# Patient Record
Sex: MALE | Race: WHITE | NOT HISPANIC OR LATINO | Employment: OTHER | URBAN - METROPOLITAN AREA
[De-identification: names, ages, dates, MRNs, and addresses within clinical notes are randomized per-mention and may not be internally consistent; named-entity substitution may affect disease eponyms.]

---

## 2022-07-18 SDOH — ECONOMIC STABILITY: FOOD INSECURITY: WITHIN THE PAST 12 MONTHS, THE FOOD YOU BOUGHT JUST DIDN'T LAST AND YOU DIDN'T HAVE MONEY TO GET MORE.: NEVER TRUE

## 2022-07-18 SDOH — ECONOMIC STABILITY: TRANSPORTATION INSECURITY
IN THE PAST 12 MONTHS, HAS THE LACK OF TRANSPORTATION KEPT YOU FROM MEDICAL APPOINTMENTS OR FROM GETTING MEDICATIONS?: NO

## 2022-07-18 SDOH — ECONOMIC STABILITY: FOOD INSECURITY: WITHIN THE PAST 12 MONTHS, YOU WORRIED THAT YOUR FOOD WOULD RUN OUT BEFORE YOU GOT MONEY TO BUY MORE.: NEVER TRUE

## 2022-07-18 SDOH — ECONOMIC STABILITY: HOUSING INSECURITY: IN THE LAST 12 MONTHS, HOW MANY PLACES HAVE YOU LIVED?: 1

## 2022-07-18 SDOH — HEALTH STABILITY: PHYSICAL HEALTH: ON AVERAGE, HOW MANY DAYS PER WEEK DO YOU ENGAGE IN MODERATE TO STRENUOUS EXERCISE (LIKE A BRISK WALK)?: 5 DAYS

## 2022-07-18 SDOH — ECONOMIC STABILITY: TRANSPORTATION INSECURITY
IN THE PAST 12 MONTHS, HAS LACK OF TRANSPORTATION KEPT YOU FROM MEETINGS, WORK, OR FROM GETTING THINGS NEEDED FOR DAILY LIVING?: NO

## 2022-07-18 SDOH — ECONOMIC STABILITY: HOUSING INSECURITY
IN THE LAST 12 MONTHS, WAS THERE A TIME WHEN YOU DID NOT HAVE A STEADY PLACE TO SLEEP OR SLEPT IN A SHELTER (INCLUDING NOW)?: NO

## 2022-07-18 SDOH — ECONOMIC STABILITY: INCOME INSECURITY: HOW HARD IS IT FOR YOU TO PAY FOR THE VERY BASICS LIKE FOOD, HOUSING, MEDICAL CARE, AND HEATING?: NOT HARD AT ALL

## 2022-07-18 SDOH — ECONOMIC STABILITY: INCOME INSECURITY: IN THE LAST 12 MONTHS, WAS THERE A TIME WHEN YOU WERE NOT ABLE TO PAY THE MORTGAGE OR RENT ON TIME?: NO

## 2022-07-18 SDOH — HEALTH STABILITY: PHYSICAL HEALTH: ON AVERAGE, HOW MANY MINUTES DO YOU ENGAGE IN EXERCISE AT THIS LEVEL?: 60 MIN

## 2022-07-18 ASSESSMENT — LIFESTYLE VARIABLES
AUDIT-C TOTAL SCORE: 4
SKIP TO QUESTIONS 9-10: 0
HOW OFTEN DO YOU HAVE A DRINK CONTAINING ALCOHOL: 2-3 TIMES A WEEK
HOW OFTEN DO YOU HAVE SIX OR MORE DRINKS ON ONE OCCASION: LESS THAN MONTHLY
HOW MANY STANDARD DRINKS CONTAINING ALCOHOL DO YOU HAVE ON A TYPICAL DAY: 1 OR 2

## 2022-07-18 ASSESSMENT — SOCIAL DETERMINANTS OF HEALTH (SDOH)
IN A TYPICAL WEEK, HOW MANY TIMES DO YOU TALK ON THE PHONE WITH FAMILY, FRIENDS, OR NEIGHBORS?: MORE THAN THREE TIMES A WEEK
HOW OFTEN DO YOU ATTEND CHURCH OR RELIGIOUS SERVICES?: MORE THAN 4 TIMES PER YEAR
ARE YOU MARRIED, WIDOWED, DIVORCED, SEPARATED, NEVER MARRIED, OR LIVING WITH A PARTNER?: LIVING WITH PARTNER
HOW OFTEN DO YOU ATTENT MEETINGS OF THE CLUB OR ORGANIZATION YOU BELONG TO?: NEVER
DO YOU BELONG TO ANY CLUBS OR ORGANIZATIONS SUCH AS CHURCH GROUPS UNIONS, FRATERNAL OR ATHLETIC GROUPS, OR SCHOOL GROUPS?: NO
HOW OFTEN DO YOU GET TOGETHER WITH FRIENDS OR RELATIVES?: ONCE A WEEK

## 2022-07-31 SDOH — ECONOMIC STABILITY: TRANSPORTATION INSECURITY
IN THE PAST 12 MONTHS, HAS LACK OF RELIABLE TRANSPORTATION KEPT YOU FROM MEDICAL APPOINTMENTS, MEETINGS, WORK OR FROM GETTING THINGS NEEDED FOR DAILY LIVING?: NO

## 2022-07-31 SDOH — HEALTH STABILITY: PHYSICAL HEALTH: ON AVERAGE, HOW MANY MINUTES DO YOU ENGAGE IN EXERCISE AT THIS LEVEL?: 60 MIN

## 2022-07-31 SDOH — ECONOMIC STABILITY: HOUSING INSECURITY: IN THE LAST 12 MONTHS, HOW MANY PLACES HAVE YOU LIVED?: 1

## 2022-07-31 SDOH — ECONOMIC STABILITY: FOOD INSECURITY: WITHIN THE PAST 12 MONTHS, YOU WORRIED THAT YOUR FOOD WOULD RUN OUT BEFORE YOU GOT MONEY TO BUY MORE.: NEVER TRUE

## 2022-07-31 SDOH — ECONOMIC STABILITY: INCOME INSECURITY: HOW HARD IS IT FOR YOU TO PAY FOR THE VERY BASICS LIKE FOOD, HOUSING, MEDICAL CARE, AND HEATING?: NOT HARD AT ALL

## 2022-07-31 SDOH — ECONOMIC STABILITY: INCOME INSECURITY: IN THE LAST 12 MONTHS, WAS THERE A TIME WHEN YOU WERE NOT ABLE TO PAY THE MORTGAGE OR RENT ON TIME?: NO

## 2022-07-31 SDOH — HEALTH STABILITY: MENTAL HEALTH
STRESS IS WHEN SOMEONE FEELS TENSE, NERVOUS, ANXIOUS, OR CAN'T SLEEP AT NIGHT BECAUSE THEIR MIND IS TROUBLED. HOW STRESSED ARE YOU?: NOT AT ALL

## 2022-07-31 SDOH — HEALTH STABILITY: PHYSICAL HEALTH: ON AVERAGE, HOW MANY DAYS PER WEEK DO YOU ENGAGE IN MODERATE TO STRENUOUS EXERCISE (LIKE A BRISK WALK)?: 5 DAYS

## 2022-07-31 SDOH — ECONOMIC STABILITY: FOOD INSECURITY: WITHIN THE PAST 12 MONTHS, THE FOOD YOU BOUGHT JUST DIDN'T LAST AND YOU DIDN'T HAVE MONEY TO GET MORE.: NEVER TRUE

## 2022-07-31 ASSESSMENT — SOCIAL DETERMINANTS OF HEALTH (SDOH)
HOW OFTEN DO YOU HAVE A DRINK CONTAINING ALCOHOL: 2-3 TIMES A WEEK
HOW OFTEN DO YOU ATTENT MEETINGS OF THE CLUB OR ORGANIZATION YOU BELONG TO?: NEVER
HOW OFTEN DO YOU HAVE SIX OR MORE DRINKS ON ONE OCCASION: LESS THAN MONTHLY
HOW HARD IS IT FOR YOU TO PAY FOR THE VERY BASICS LIKE FOOD, HOUSING, MEDICAL CARE, AND HEATING?: NOT HARD AT ALL
ARE YOU MARRIED, WIDOWED, DIVORCED, SEPARATED, NEVER MARRIED, OR LIVING WITH A PARTNER?: LIVING WITH PARTNER
DO YOU BELONG TO ANY CLUBS OR ORGANIZATIONS SUCH AS CHURCH GROUPS UNIONS, FRATERNAL OR ATHLETIC GROUPS, OR SCHOOL GROUPS?: NO
HOW OFTEN DO YOU GET TOGETHER WITH FRIENDS OR RELATIVES?: ONCE A WEEK
HOW OFTEN DO YOU ATTEND CHURCH OR RELIGIOUS SERVICES?: MORE THAN 4 TIMES PER YEAR
WITHIN THE PAST 12 MONTHS, YOU WORRIED THAT YOUR FOOD WOULD RUN OUT BEFORE YOU GOT THE MONEY TO BUY MORE: NEVER TRUE
DO YOU BELONG TO ANY CLUBS OR ORGANIZATIONS SUCH AS CHURCH GROUPS UNIONS, FRATERNAL OR ATHLETIC GROUPS, OR SCHOOL GROUPS?: NO
HOW MANY DRINKS CONTAINING ALCOHOL DO YOU HAVE ON A TYPICAL DAY WHEN YOU ARE DRINKING: 1 OR 2
IN A TYPICAL WEEK, HOW MANY TIMES DO YOU TALK ON THE PHONE WITH FAMILY, FRIENDS, OR NEIGHBORS?: MORE THAN THREE TIMES A WEEK
IN A TYPICAL WEEK, HOW MANY TIMES DO YOU TALK ON THE PHONE WITH FAMILY, FRIENDS, OR NEIGHBORS?: MORE THAN THREE TIMES A WEEK
HOW OFTEN DO YOU ATTENT MEETINGS OF THE CLUB OR ORGANIZATION YOU BELONG TO?: NEVER
HOW OFTEN DO YOU ATTEND CHURCH OR RELIGIOUS SERVICES?: MORE THAN 4 TIMES PER YEAR
ARE YOU MARRIED, WIDOWED, DIVORCED, SEPARATED, NEVER MARRIED, OR LIVING WITH A PARTNER?: LIVING WITH PARTNER
HOW OFTEN DO YOU GET TOGETHER WITH FRIENDS OR RELATIVES?: ONCE A WEEK

## 2022-07-31 ASSESSMENT — LIFESTYLE VARIABLES
HOW OFTEN DO YOU HAVE SIX OR MORE DRINKS ON ONE OCCASION: LESS THAN MONTHLY
AUDIT-C TOTAL SCORE: 4
SKIP TO QUESTIONS 9-10: 0
HOW MANY STANDARD DRINKS CONTAINING ALCOHOL DO YOU HAVE ON A TYPICAL DAY: 1 OR 2
HOW OFTEN DO YOU HAVE A DRINK CONTAINING ALCOHOL: 2-3 TIMES A WEEK

## 2022-08-01 ENCOUNTER — OFFICE VISIT (OUTPATIENT)
Dept: SPORTS MEDICINE | Facility: CLINIC | Age: 32
End: 2022-08-01
Payer: OTHER GOVERNMENT

## 2022-08-01 VITALS
SYSTOLIC BLOOD PRESSURE: 118 MMHG | HEIGHT: 67 IN | TEMPERATURE: 98.9 F | OXYGEN SATURATION: 98 % | WEIGHT: 175 LBS | HEART RATE: 80 BPM | RESPIRATION RATE: 16 BRPM | DIASTOLIC BLOOD PRESSURE: 80 MMHG | BODY MASS INDEX: 27.47 KG/M2

## 2022-08-01 DIAGNOSIS — S76.311S PARTIAL HAMSTRING TEAR, RIGHT, SEQUELA: ICD-10-CM

## 2022-08-01 PROCEDURE — 99203 OFFICE O/P NEW LOW 30 MIN: CPT | Performed by: FAMILY MEDICINE

## 2022-08-01 ASSESSMENT — ENCOUNTER SYMPTOMS
NAUSEA: 0
SHORTNESS OF BREATH: 0
FEVER: 0
DIZZINESS: 0
VOMITING: 0
CHILLS: 0

## 2022-08-01 NOTE — PROGRESS NOTES
"Chief Complaint   Patient presents with   • Leg Pain     Referral from PCP/ R hamstring pain        Subjective     Referred by [facility] Lisa  for evaluation of RIGHT hamstring weakness  Combat deployment  Approximately March 15, 2017  Approximately 3 days after his incident he did see one of the ship/crew physicians which provided him with some exercises and diagnosed him with a \"partial hamstring tear\"  Slipped on wet marble floor  Had mid posterior hamstring pain  Noticed bruising at the hamstring  For his annual fitness testing he elects to use the cycling test as opposed to running because running is challenging for him due to the hamstring injury  He still experiences pain with engaging the hamstring muscle aggressively  Any leg exercises involving hamstring engagement such as hamstring curl can be painful so he avoids those activities  He also gets intermittent cramping in the RIGHT lower extremity/hamstring region which occurs about once per week lasting a minute or so  Took 3-6 months to recover and about a year to recover fully to his current baseline  He still has a defect in the muscle with noticeable weakness with certain activities  Has challenges with splinting and running at fast speeds  Never had workup back then since he was in combat zone and busy  Notices weakness in the muscle  POSITIVE night symptoms intermittently, about once per week which is when he gets his \"charley horse\"/cramping episodes  Not taking medication for currently      Likes mountain biking and skiing    Review of Systems   Constitutional: Negative for chills and fever.   Respiratory: Negative for shortness of breath.    Cardiovascular: Negative for chest pain.   Gastrointestinal: Negative for nausea and vomiting.   Neurological: Negative for dizziness.     PMH:  has no past medical history on file.  MEDS: No current outpatient medications on file.  ALLERGIES: No Known Allergies  SURGHX: No past " "surgical history on file.  SOCHX:  reports that he has never smoked. He has never used smokeless tobacco.  FH: Family history was reviewed, no pertinent findings to report    Objective   /80 (BP Location: Left arm, Patient Position: Sitting, BP Cuff Size: Adult)   Pulse 80   Temp 37.2 °C (98.9 °F) (Temporal)   Resp 16   Ht 1.702 m (5' 7\")   Wt 79.4 kg (175 lb)   SpO2 98%   BMI 27.41 kg/m²     HIP EXAM:  NORMAL gait    Right hip: Range of motion is intact  NEGATIVE pain with internal rotation  juani's test is NEGATIVE  NO tenderness of the trochanteric bursa  NO tenderness of the gluteus medius  Calin's test is NEGATIVE  Visibly noticeable and palpable defect at the lateral proximal third of the hamstring  POSITIVE pain and significant weakness with resisted flexion of the RIGHT hamstring with the knee fully extended.  Mild to moderate weakness of the RIGHT hamstring with resisted flexion with the knee flexexed to 90 degrees     Left hip: Range of motion is intact  NEGATIVE pain with internal rotation  juani's test is NEGATIVE  NO tenderness of the trochanteric bursa  NO tenderness of the gluteus medius  Calin's test is NEGATIVE  NO notable weakness with resisted knee flexion with the knee fully extended or flexed at 90 degrees    1. Partial hamstring tear, right, sequela  Referral to Physical Therapy    MR-FEMUR-W/O RIGHT     Combat deployment  Approximately March 15, 2017  Approximately 3 days after his incident he did see one of the ship/crew physicians which provided him with some exercises and diagnosed him with a \"partial hamstring tear\"  Slipped on wet marble floor    Referral for physical [facility] ([facility])    Check MRI of the RIGHT thigh for assessment of the hamstring        8/1/22                        Days after injury back in 2017      No follow-ups on file.      Thank you [facility] for allowing me to participate in caring for your patient.      CC:  [facility] " [facility]   FAX  [phone]

## 2022-08-11 ENCOUNTER — APPOINTMENT (OUTPATIENT)
Dept: RADIOLOGY | Facility: MEDICAL CENTER | Age: 32
End: 2022-08-11
Attending: FAMILY MEDICINE
Payer: OTHER GOVERNMENT

## 2022-08-12 ENCOUNTER — APPOINTMENT (OUTPATIENT)
Dept: RADIOLOGY | Facility: MEDICAL CENTER | Age: 32
End: 2022-08-12
Attending: FAMILY MEDICINE
Payer: OTHER GOVERNMENT

## 2022-08-12 DIAGNOSIS — S76.311S PARTIAL HAMSTRING TEAR, RIGHT, SEQUELA: ICD-10-CM

## 2022-08-12 PROCEDURE — 73718 MRI LOWER EXTREMITY W/O DYE: CPT | Mod: RT

## 2022-08-15 ENCOUNTER — OFFICE VISIT (OUTPATIENT)
Dept: SPORTS MEDICINE | Facility: CLINIC | Age: 32
End: 2022-08-15
Payer: OTHER GOVERNMENT

## 2022-08-15 VITALS — BODY MASS INDEX: 27.47 KG/M2 | HEIGHT: 67 IN | WEIGHT: 175 LBS

## 2022-08-15 DIAGNOSIS — S76.311S PARTIAL HAMSTRING TEAR, RIGHT, SEQUELA: ICD-10-CM

## 2022-08-15 PROCEDURE — 99213 OFFICE O/P EST LOW 20 MIN: CPT | Performed by: FAMILY MEDICINE

## 2022-08-15 NOTE — PROGRESS NOTES
1. Partial hamstring tear, right, sequela          Patient is here to discuss MRI results  There is evidence of old hamstring injury at the insertion  Nearly 50% abnormality at the insertion of the hamstring tendon with defect in the muscle    We discussed management including formal physical therapy to try and salvage what is left over of the tendon and muscle function    He has already been referred for formal physical therapy ([facility])        8/12/2022 5:00 PM     HISTORY/REASON FOR EXAM:  Upper leg trauma, neurovasc/lig/tendon injury suspected; Please include proximal hamstring insertion  Right hamstring muscle tear     TECHNIQUE/EXAM DESCRIPTION:  MRI of the RIGHT femur without.     The study was performed on a Siemens Skyra 3.0 Blanca MRI scanner. T1 sagittal, T1 coronal, T1 axial, T2 fat-suppressed axial, T1 postcontrast fat-suppressed axial, and fast spin-echo inversion recovery sagittal images were obtained of the femur.     COMPARISON: None.     FINDINGS:     There is a small right knee joint effusion.        BONE AND MARROW: The bones and bone marrow are normal in signal intensity and morphology.     MUSCLES: There is atrophy of the hamstring muscles.     LIGAMENTS and TENDONS: The hamstring insertion on the ischio tuberosity is abnormal. The tendon is attenuated and some fibers attached to the tuberosity. Findings are consistent with prior tear with partial retraction. The tendon distal/inferior to the   insertion has areas of high an low T2 signal consistent with old fluid/hemorrhage.     MISCELLANEOUS: Visualized are tiny bilateral hydrocele.           IMPRESSION:     1.  Findings consistent with sequela of/old hamstring detachment from the ischial tuberosity with partial tear of the hamstring tendon but some intact fibers     2.  Just distal/inferior to the ischial tuberosity there is either scar tissue and some attached fibers and along the more distal course of the tendon there are  changes consistent with old hemorrhage     3.  The proximal hamstring muscles show mild-moderate atrophy     4.  Small knee joint effusion           Exam Ended: 08/12/22  5:50 PM Last Resulted: 08/13/22  2:57 PM           Interpreted in the office today with the patient    CC:  [facility]   FAX  [phone]

## 2022-08-27 ENCOUNTER — HOSPITAL ENCOUNTER (OUTPATIENT)
Facility: MEDICAL CENTER | Age: 32
End: 2022-08-27
Attending: HEALTH CARE PROVIDER
Payer: OTHER GOVERNMENT

## 2024-09-12 ENCOUNTER — APPOINTMENT (OUTPATIENT)
Dept: RADIOLOGY | Facility: MEDICAL CENTER | Age: 34
DRG: 871 | End: 2024-09-12
Attending: STUDENT IN AN ORGANIZED HEALTH CARE EDUCATION/TRAINING PROGRAM
Payer: OTHER GOVERNMENT

## 2024-09-12 ENCOUNTER — HOSPITAL ENCOUNTER (OUTPATIENT)
Dept: RADIOLOGY | Facility: MEDICAL CENTER | Age: 34
End: 2024-09-12

## 2024-09-12 ENCOUNTER — HOSPITAL ENCOUNTER (INPATIENT)
Facility: MEDICAL CENTER | Age: 34
LOS: 7 days | DRG: 871 | End: 2024-09-19
Attending: STUDENT IN AN ORGANIZED HEALTH CARE EDUCATION/TRAINING PROGRAM | Admitting: STUDENT IN AN ORGANIZED HEALTH CARE EDUCATION/TRAINING PROGRAM
Payer: OTHER GOVERNMENT

## 2024-09-12 DIAGNOSIS — G00.1 STREPTOCOCCUS PNEUMONIAE MENINGITIS: ICD-10-CM

## 2024-09-12 PROBLEM — R65.20: Status: ACTIVE | Noted: 2024-09-12

## 2024-09-12 PROBLEM — G93.41: Status: ACTIVE | Noted: 2024-09-12

## 2024-09-12 PROBLEM — A40.3: Status: ACTIVE | Noted: 2024-09-12

## 2024-09-12 PROBLEM — G04.90 ENCEPHALITIS: Status: ACTIVE | Noted: 2024-09-12

## 2024-09-12 PROBLEM — Z99.11 ON MECHANICALLY ASSISTED VENTILATION (HCC): Status: ACTIVE | Noted: 2024-09-12

## 2024-09-12 LAB
ALBUMIN SERPL BCP-MCNC: 4.2 G/DL (ref 3.2–4.9)
ALBUMIN/GLOB SERPL: 1.7 G/DL
ALP SERPL-CCNC: 39 U/L (ref 30–99)
ALT SERPL-CCNC: 16 U/L (ref 2–50)
AMPHET UR QL SCN: NEGATIVE
ANION GAP SERPL CALC-SCNC: 17 MMOL/L (ref 7–16)
APPEARANCE UR: CLEAR
AST SERPL-CCNC: 21 U/L (ref 12–45)
BACTERIA #/AREA URNS HPF: NEGATIVE /HPF
BARBITURATES UR QL SCN: NEGATIVE
BASE EXCESS BLDA CALC-SCNC: -7 MMOL/L (ref -4–3)
BASOPHILS # BLD AUTO: 0.2 % (ref 0–1.8)
BASOPHILS # BLD: 0.02 K/UL (ref 0–0.12)
BENZODIAZ UR QL SCN: NEGATIVE
BILIRUB SERPL-MCNC: 1.2 MG/DL (ref 0.1–1.5)
BILIRUB UR QL STRIP.AUTO: NEGATIVE
BODY TEMPERATURE: ABNORMAL DEGREES
BREATHS SETTING VENT: 20
BUN SERPL-MCNC: 12 MG/DL (ref 8–22)
BURR CELLS/RBC NFR CSF MANUAL: 0 %
BZE UR QL SCN: NEGATIVE
C GATTII+NEOFOR DNA CSF QL NAA+NON-PROBE: NOT DETECTED
CALCIUM ALBUM COR SERPL-MCNC: 8.5 MG/DL (ref 8.5–10.5)
CALCIUM SERPL-MCNC: 8.7 MG/DL (ref 8.5–10.5)
CANNABINOIDS UR QL SCN: NEGATIVE
CHLORIDE SERPL-SCNC: 101 MMOL/L (ref 96–112)
CLARITY CSF: ABNORMAL
CMV DNA CSF QL NAA+NON-PROBE: NOT DETECTED
CO2 BLDA-SCNC: 20 MMOL/L (ref 20–33)
CO2 SERPL-SCNC: 18 MMOL/L (ref 20–33)
COLOR CSF: ABNORMAL
COLOR SPUN CSF: ABNORMAL
COLOR UR: ABNORMAL
CREAT SERPL-MCNC: 0.92 MG/DL (ref 0.5–1.4)
DELSYS IDSYS: ABNORMAL
E COLI K1 DNA CSF QL NAA+NON-PROBE: NOT DETECTED
EKG IMPRESSION: NORMAL
EOSINOPHIL # BLD AUTO: 0 K/UL (ref 0–0.51)
EOSINOPHIL NFR BLD: 0 % (ref 0–6.9)
EPI CELLS #/AREA URNS HPF: NEGATIVE /HPF
ERYTHROCYTE [DISTWIDTH] IN BLOOD BY AUTOMATED COUNT: 39.7 FL (ref 35.9–50)
EV RNA CSF QL NAA+NON-PROBE: NOT DETECTED
FENTANYL UR QL: NEGATIVE
GFR SERPLBLD CREATININE-BSD FMLA CKD-EPI: 111 ML/MIN/1.73 M 2
GLOBULIN SER CALC-MCNC: 2.5 G/DL (ref 1.9–3.5)
GLUCOSE CSF-MCNC: 3 MG/DL (ref 40–80)
GLUCOSE SERPL-MCNC: 192 MG/DL (ref 65–99)
GLUCOSE UR STRIP.AUTO-MCNC: 500 MG/DL
GP B STREP DNA CSF QL NAA+NON-PROBE: NOT DETECTED
GRAM STN SPEC: ABNORMAL
HAEM INFLU DNA CSF QL NAA+NON-PROBE: NOT DETECTED
HCO3 BLDA-SCNC: 19.3 MMOL/L (ref 17–25)
HCT VFR BLD AUTO: 43.6 % (ref 42–52)
HGB BLD-MCNC: 14.6 G/DL (ref 14–18)
HHV6 DNA CSF QL NAA+NON-PROBE: NOT DETECTED
HOROWITZ INDEX BLDA+IHG-RTO: 297 MM[HG]
HSV1 DNA CSF QL NAA+NON-PROBE: NOT DETECTED
HSV2 DNA CSF QL NAA+NON-PROBE: NOT DETECTED
HYALINE CASTS #/AREA URNS LPF: ABNORMAL /LPF
IMM GRANULOCYTES # BLD AUTO: 0.03 K/UL (ref 0–0.11)
IMM GRANULOCYTES NFR BLD AUTO: 0.3 % (ref 0–0.9)
INR PPP: 1 (ref 0.87–1.13)
KETONES UR STRIP.AUTO-MCNC: 80 MG/DL
L MONOCYTOG DNA CSF QL NAA+NON-PROBE: NOT DETECTED
LACTATE BLD-SCNC: 1 MMOL/L (ref 0.5–2)
LEUKOCYTE ESTERASE UR QL STRIP.AUTO: NEGATIVE
LYMPHOCYTES # BLD AUTO: 0.27 K/UL (ref 1–4.8)
LYMPHOCYTES NFR BLD: 3.1 % (ref 22–41)
MCH RBC QN AUTO: 29.4 PG (ref 27–33)
MCHC RBC AUTO-ENTMCNC: 33.5 G/DL (ref 32.3–36.5)
MCV RBC AUTO: 87.7 FL (ref 81.4–97.8)
METHADONE UR QL SCN: NEGATIVE
MICRO URNS: ABNORMAL
MODE IMODE: ABNORMAL
MONOCYTES # BLD AUTO: 0.29 K/UL (ref 0–0.85)
MONOCYTES NFR BLD AUTO: 3.3 % (ref 0–13.4)
MONOS+MACROS NFR CSF MANUAL: 1 %
N MEN DNA CSF QL NAA+NON-PROBE: NOT DETECTED
NEUTROPHILS # BLD AUTO: 8.23 K/UL (ref 1.82–7.42)
NEUTROPHILS NFR BLD: 93.1 % (ref 44–72)
NEUTROPHILS NFR CSF: 99 %
NITRITE UR QL STRIP.AUTO: NEGATIVE
NRBC # BLD AUTO: 0 K/UL
NRBC BLD-RTO: 0 /100 WBC (ref 0–0.2)
NUC CELL # CSF: ABNORMAL CELLS/UL (ref 0–10)
O2/TOTAL GAS SETTING VFR VENT: 30 %
OPIATES UR QL SCN: POSITIVE
OXYCODONE UR QL SCN: NEGATIVE
PARECHOVIRUS A RNA CSF QL NAA+NON-PROBE: NOT DETECTED
PCO2 BLDA: 39.7 MMHG (ref 26–37)
PCO2 TEMP ADJ BLDA: 40.6 MMHG (ref 26–37)
PCP UR QL SCN: NEGATIVE
PEEP END EXPIRATORY PRESSURE IPEEP: 8 CMH20
PH BLDA: 7.29 [PH] (ref 7.4–7.5)
PH TEMP ADJ BLDA: 7.29 [PH] (ref 7.4–7.5)
PH UR STRIP.AUTO: 5 [PH] (ref 5–8)
PLATELET # BLD AUTO: 178 K/UL (ref 164–446)
PMV BLD AUTO: 9 FL (ref 9–12.9)
PO2 BLDA: 89 MMHG (ref 64–87)
PO2 TEMP ADJ BLDA: 92 MMHG (ref 64–87)
POTASSIUM SERPL-SCNC: 3.8 MMOL/L (ref 3.6–5.5)
PROPOXYPH UR QL SCN: NEGATIVE
PROT CSF-MCNC: >600 MG/DL (ref 15–45)
PROT SERPL-MCNC: 6.7 G/DL (ref 6–8.2)
PROT UR QL STRIP: NEGATIVE MG/DL
PROTHROMBIN TIME: 13.3 SEC (ref 12–14.6)
RBC # BLD AUTO: 4.97 M/UL (ref 4.7–6.1)
RBC # CSF: 1000 CELLS/UL
RBC # URNS HPF: ABNORMAL /HPF
RBC UR QL AUTO: ABNORMAL
S PNEUM DNA CSF QL NAA+NON-PROBE: DETECTED
SAO2 % BLDA: 96 % (ref 93–99)
SIGNIFICANT IND 70042: ABNORMAL
SITE SITE: ABNORMAL
SODIUM SERPL-SCNC: 136 MMOL/L (ref 135–145)
SOURCE SOURCE: ABNORMAL
SP GR UR STRIP.AUTO: >=1.045
SPECIMEN DRAWN FROM PATIENT: ABNORMAL
SPECIMEN VOL CSF: 6.3 ML
TIDAL VOLUME IVT: 380 ML
TRIGL SERPL-MCNC: 47 MG/DL (ref 0–149)
TROPONIN T SERPL-MCNC: <6 NG/L (ref 6–19)
TUBE # CSF: 4
TUBE # CSF: ABNORMAL
UROBILINOGEN UR STRIP.AUTO-MCNC: 0.2 MG/DL
VZV DNA CSF QL NAA+NON-PROBE: NOT DETECTED
WBC # BLD AUTO: 8.8 K/UL (ref 4.8–10.8)
WBC #/AREA URNS HPF: ABNORMAL /HPF

## 2024-09-12 PROCEDURE — 5A1945Z RESPIRATORY VENTILATION, 24-96 CONSECUTIVE HOURS: ICD-10-PCS | Performed by: STUDENT IN AN ORGANIZED HEALTH CARE EDUCATION/TRAINING PROGRAM

## 2024-09-12 PROCEDURE — 36415 COLL VENOUS BLD VENIPUNCTURE: CPT

## 2024-09-12 PROCEDURE — 87205 SMEAR GRAM STAIN: CPT

## 2024-09-12 PROCEDURE — 96365 THER/PROPH/DIAG IV INF INIT: CPT | Mod: XU

## 2024-09-12 PROCEDURE — 83605 ASSAY OF LACTIC ACID: CPT

## 2024-09-12 PROCEDURE — 96367 TX/PROPH/DG ADDL SEQ IV INF: CPT | Mod: XU

## 2024-09-12 PROCEDURE — 84157 ASSAY OF PROTEIN OTHER: CPT

## 2024-09-12 PROCEDURE — 96375 TX/PRO/DX INJ NEW DRUG ADDON: CPT | Mod: XU

## 2024-09-12 PROCEDURE — 87641 MR-STAPH DNA AMP PROBE: CPT

## 2024-09-12 PROCEDURE — 31500 INSERT EMERGENCY AIRWAY: CPT

## 2024-09-12 PROCEDURE — 94002 VENT MGMT INPAT INIT DAY: CPT

## 2024-09-12 PROCEDURE — 700111 HCHG RX REV CODE 636 W/ 250 OVERRIDE (IP): Mod: JZ

## 2024-09-12 PROCEDURE — 0BH17EZ INSERTION OF ENDOTRACHEAL AIRWAY INTO TRACHEA, VIA NATURAL OR ARTIFICIAL OPENING: ICD-10-PCS | Performed by: STUDENT IN AN ORGANIZED HEALTH CARE EDUCATION/TRAINING PROGRAM

## 2024-09-12 PROCEDURE — 700117 HCHG RX CONTRAST REV CODE 255: Mod: JZ | Performed by: STUDENT IN AN ORGANIZED HEALTH CARE EDUCATION/TRAINING PROGRAM

## 2024-09-12 PROCEDURE — 700111 HCHG RX REV CODE 636 W/ 250 OVERRIDE (IP): Mod: JZ | Performed by: STUDENT IN AN ORGANIZED HEALTH CARE EDUCATION/TRAINING PROGRAM

## 2024-09-12 PROCEDURE — 009U3ZX DRAINAGE OF SPINAL CANAL, PERCUTANEOUS APPROACH, DIAGNOSTIC: ICD-10-PCS | Performed by: STUDENT IN AN ORGANIZED HEALTH CARE EDUCATION/TRAINING PROGRAM

## 2024-09-12 PROCEDURE — 700105 HCHG RX REV CODE 258: Performed by: NURSE PRACTITIONER

## 2024-09-12 PROCEDURE — 51702 INSERT TEMP BLADDER CATH: CPT | Mod: XU

## 2024-09-12 PROCEDURE — 87040 BLOOD CULTURE FOR BACTERIA: CPT

## 2024-09-12 PROCEDURE — 96366 THER/PROPH/DIAG IV INF ADDON: CPT | Mod: XU

## 2024-09-12 PROCEDURE — 770022 HCHG ROOM/CARE - ICU (200)

## 2024-09-12 PROCEDURE — 99291 CRITICAL CARE FIRST HOUR: CPT

## 2024-09-12 PROCEDURE — A9579 GAD-BASE MR CONTRAST NOS,1ML: HCPCS | Mod: JZ | Performed by: STUDENT IN AN ORGANIZED HEALTH CARE EDUCATION/TRAINING PROGRAM

## 2024-09-12 PROCEDURE — 85025 COMPLETE CBC W/AUTO DIFF WBC: CPT

## 2024-09-12 PROCEDURE — 70553 MRI BRAIN STEM W/O & W/DYE: CPT

## 2024-09-12 PROCEDURE — 80307 DRUG TEST PRSMV CHEM ANLYZR: CPT

## 2024-09-12 PROCEDURE — 99291 CRITICAL CARE FIRST HOUR: CPT | Performed by: STUDENT IN AN ORGANIZED HEALTH CARE EDUCATION/TRAINING PROGRAM

## 2024-09-12 PROCEDURE — 94003 VENT MGMT INPAT SUBQ DAY: CPT

## 2024-09-12 PROCEDURE — 99223 1ST HOSP IP/OBS HIGH 75: CPT | Performed by: PSYCHIATRY & NEUROLOGY

## 2024-09-12 PROCEDURE — 81001 URINALYSIS AUTO W/SCOPE: CPT

## 2024-09-12 PROCEDURE — 82945 GLUCOSE OTHER FLUID: CPT

## 2024-09-12 PROCEDURE — 96368 THER/DIAG CONCURRENT INF: CPT | Mod: XU

## 2024-09-12 PROCEDURE — 82803 BLOOD GASES ANY COMBINATION: CPT

## 2024-09-12 PROCEDURE — 700101 HCHG RX REV CODE 250

## 2024-09-12 PROCEDURE — 87181 SC STD AGAR DILUTION PER AGT: CPT

## 2024-09-12 PROCEDURE — 71045 X-RAY EXAM CHEST 1 VIEW: CPT

## 2024-09-12 PROCEDURE — 87077 CULTURE AEROBIC IDENTIFY: CPT

## 2024-09-12 PROCEDURE — 303105 HCHG CATHETER EXTRA

## 2024-09-12 PROCEDURE — 302214 INTUBATION BOX: Performed by: STUDENT IN AN ORGANIZED HEALTH CARE EDUCATION/TRAINING PROGRAM

## 2024-09-12 PROCEDURE — 700105 HCHG RX REV CODE 258: Performed by: STUDENT IN AN ORGANIZED HEALTH CARE EDUCATION/TRAINING PROGRAM

## 2024-09-12 PROCEDURE — 36600 WITHDRAWAL OF ARTERIAL BLOOD: CPT

## 2024-09-12 PROCEDURE — 93005 ELECTROCARDIOGRAM TRACING: CPT | Performed by: STUDENT IN AN ORGANIZED HEALTH CARE EDUCATION/TRAINING PROGRAM

## 2024-09-12 PROCEDURE — 80053 COMPREHEN METABOLIC PANEL: CPT

## 2024-09-12 PROCEDURE — 84484 ASSAY OF TROPONIN QUANT: CPT

## 2024-09-12 PROCEDURE — 87070 CULTURE OTHR SPECIMN AEROBIC: CPT

## 2024-09-12 PROCEDURE — 87483 CNS DNA AMP PROBE TYPE 12-25: CPT

## 2024-09-12 PROCEDURE — 70544 MR ANGIOGRAPHY HEAD W/O DYE: CPT

## 2024-09-12 PROCEDURE — 84478 ASSAY OF TRIGLYCERIDES: CPT

## 2024-09-12 PROCEDURE — 85610 PROTHROMBIN TIME: CPT

## 2024-09-12 PROCEDURE — 89051 BODY FLUID CELL COUNT: CPT

## 2024-09-12 RX ORDER — SODIUM CHLORIDE, SODIUM LACTATE, POTASSIUM CHLORIDE, AND CALCIUM CHLORIDE .6; .31; .03; .02 G/100ML; G/100ML; G/100ML; G/100ML
30 INJECTION, SOLUTION INTRAVENOUS
Status: DISCONTINUED | OUTPATIENT
Start: 2024-09-12 | End: 2024-09-14

## 2024-09-12 RX ORDER — LABETALOL HYDROCHLORIDE 5 MG/ML
10 INJECTION, SOLUTION INTRAVENOUS EVERY 4 HOURS PRN
Status: DISCONTINUED | OUTPATIENT
Start: 2024-09-12 | End: 2024-09-19 | Stop reason: HOSPADM

## 2024-09-12 RX ORDER — DEXAMETHASONE SODIUM PHOSPHATE 4 MG/ML
10 INJECTION, SOLUTION INTRA-ARTICULAR; INTRALESIONAL; INTRAMUSCULAR; INTRAVENOUS; SOFT TISSUE EVERY 6 HOURS
Status: COMPLETED | OUTPATIENT
Start: 2024-09-13 | End: 2024-09-16

## 2024-09-12 RX ORDER — CEFTRIAXONE 2 G/1
2000 INJECTION, POWDER, FOR SOLUTION INTRAMUSCULAR; INTRAVENOUS ONCE
Status: COMPLETED | OUTPATIENT
Start: 2024-09-12 | End: 2024-09-12

## 2024-09-12 RX ORDER — FAMOTIDINE 20 MG/1
20 TABLET, FILM COATED ORAL EVERY 12 HOURS
Status: DISCONTINUED | OUTPATIENT
Start: 2024-09-12 | End: 2024-09-14

## 2024-09-12 RX ORDER — ACETAMINOPHEN 500 MG
1000 TABLET ORAL EVERY 6 HOURS PRN
COMMUNITY

## 2024-09-12 RX ORDER — DEXTROSE MONOHYDRATE 25 G/50ML
25 INJECTION, SOLUTION INTRAVENOUS
Status: DISCONTINUED | OUTPATIENT
Start: 2024-09-12 | End: 2024-09-15

## 2024-09-12 RX ORDER — DEXAMETHASONE SODIUM PHOSPHATE 4 MG/ML
10 INJECTION, SOLUTION INTRA-ARTICULAR; INTRALESIONAL; INTRAMUSCULAR; INTRAVENOUS; SOFT TISSUE ONCE
Status: COMPLETED | OUTPATIENT
Start: 2024-09-12 | End: 2024-09-12

## 2024-09-12 RX ORDER — ACETAMINOPHEN 325 MG/1
650 TABLET ORAL EVERY 6 HOURS PRN
Status: DISCONTINUED | OUTPATIENT
Start: 2024-09-12 | End: 2024-09-15

## 2024-09-12 RX ORDER — IBUPROFEN 200 MG
400 TABLET ORAL EVERY 6 HOURS PRN
Status: ON HOLD | COMMUNITY
End: 2024-09-18

## 2024-09-12 RX ORDER — DEXAMETHASONE SODIUM PHOSPHATE 4 MG/ML
10 INJECTION, SOLUTION INTRA-ARTICULAR; INTRALESIONAL; INTRAMUSCULAR; INTRAVENOUS; SOFT TISSUE EVERY 6 HOURS
Status: DISCONTINUED | OUTPATIENT
Start: 2024-09-13 | End: 2024-09-12

## 2024-09-12 RX ORDER — ETOMIDATE 2 MG/ML
10 INJECTION INTRAVENOUS ONCE
Status: COMPLETED | OUTPATIENT
Start: 2024-09-12 | End: 2024-09-12

## 2024-09-12 RX ORDER — ENOXAPARIN SODIUM 100 MG/ML
40 INJECTION SUBCUTANEOUS DAILY
Status: DISCONTINUED | OUTPATIENT
Start: 2024-09-12 | End: 2024-09-19 | Stop reason: HOSPADM

## 2024-09-12 RX ORDER — ETOMIDATE 2 MG/ML
20 INJECTION INTRAVENOUS ONCE
Status: COMPLETED | OUTPATIENT
Start: 2024-09-12 | End: 2024-09-12

## 2024-09-12 RX ORDER — HYDRALAZINE HYDROCHLORIDE 20 MG/ML
10 INJECTION INTRAMUSCULAR; INTRAVENOUS EVERY 4 HOURS PRN
Status: DISCONTINUED | OUTPATIENT
Start: 2024-09-12 | End: 2024-09-19 | Stop reason: HOSPADM

## 2024-09-12 RX ORDER — ONDANSETRON 2 MG/ML
4 INJECTION INTRAMUSCULAR; INTRAVENOUS EVERY 6 HOURS PRN
Status: DISCONTINUED | OUTPATIENT
Start: 2024-09-12 | End: 2024-09-19 | Stop reason: HOSPADM

## 2024-09-12 RX ORDER — ROCURONIUM BROMIDE 10 MG/ML
100 INJECTION, SOLUTION INTRAVENOUS ONCE
Status: COMPLETED | OUTPATIENT
Start: 2024-09-12 | End: 2024-09-12

## 2024-09-12 RX ORDER — AMOXICILLIN 250 MG
2 CAPSULE ORAL 2 TIMES DAILY
Status: DISCONTINUED | OUTPATIENT
Start: 2024-09-12 | End: 2024-09-15

## 2024-09-12 RX ORDER — POLYETHYLENE GLYCOL 3350 17 G/17G
1 POWDER, FOR SOLUTION ORAL
Status: DISCONTINUED | OUTPATIENT
Start: 2024-09-12 | End: 2024-09-15

## 2024-09-12 RX ORDER — SODIUM CHLORIDE, SODIUM LACTATE, POTASSIUM CHLORIDE, CALCIUM CHLORIDE 600; 310; 30; 20 MG/100ML; MG/100ML; MG/100ML; MG/100ML
INJECTION, SOLUTION INTRAVENOUS CONTINUOUS
Status: DISCONTINUED | OUTPATIENT
Start: 2024-09-12 | End: 2024-09-15

## 2024-09-12 RX ORDER — MELATONIN 5 MG
10 TABLET,CHEWABLE ORAL
COMMUNITY

## 2024-09-12 RX ORDER — LORAZEPAM 2 MG/ML
2 INJECTION INTRAMUSCULAR ONCE
Status: COMPLETED | OUTPATIENT
Start: 2024-09-12 | End: 2024-09-12

## 2024-09-12 RX ADMIN — PROPOFOL 80 MCG/KG/MIN: 10 INJECTION, EMULSION INTRAVENOUS at 23:16

## 2024-09-12 RX ADMIN — LORAZEPAM 2 MG: 2 INJECTION INTRAMUSCULAR; INTRAVENOUS at 18:58

## 2024-09-12 RX ADMIN — SODIUM CHLORIDE, POTASSIUM CHLORIDE, SODIUM LACTATE AND CALCIUM CHLORIDE: 600; 310; 30; 20 INJECTION, SOLUTION INTRAVENOUS at 23:11

## 2024-09-12 RX ADMIN — VANCOMYCIN HYDROCHLORIDE 2000 MG: 5 INJECTION, POWDER, LYOPHILIZED, FOR SOLUTION INTRAVENOUS at 19:52

## 2024-09-12 RX ADMIN — FENTANYL CITRATE 100 MCG: 50 INJECTION, SOLUTION INTRAMUSCULAR; INTRAVENOUS at 19:01

## 2024-09-12 RX ADMIN — DEXAMETHASONE SODIUM PHOSPHATE 10 MG: 4 INJECTION INTRA-ARTICULAR; INTRALESIONAL; INTRAMUSCULAR; INTRAVENOUS; SOFT TISSUE at 19:35

## 2024-09-12 RX ADMIN — Medication 100 MCG/HR: at 19:43

## 2024-09-12 RX ADMIN — ACYCLOVIR SODIUM 650 MG: 500 INJECTION, SOLUTION INTRAVENOUS at 22:01

## 2024-09-12 RX ADMIN — ETOMIDATE 20 MG: 2 INJECTION, SOLUTION INTRAVENOUS at 18:25

## 2024-09-12 RX ADMIN — ROCURONIUM BROMIDE 100 MG: 50 INJECTION, SOLUTION INTRAVENOUS at 18:28

## 2024-09-12 RX ADMIN — PROPOFOL 40 MCG/KG/MIN: 10 INJECTION, EMULSION INTRAVENOUS at 18:38

## 2024-09-12 RX ADMIN — GADOTERIDOL 15 ML: 279.3 INJECTION, SOLUTION INTRAVENOUS at 21:54

## 2024-09-12 RX ADMIN — CEFTRIAXONE SODIUM 2000 MG: 2 INJECTION, POWDER, FOR SOLUTION INTRAMUSCULAR; INTRAVENOUS at 19:38

## 2024-09-12 RX ADMIN — ETOMIDATE 10 MG: 2 INJECTION, SOLUTION INTRAVENOUS at 18:27

## 2024-09-12 RX ADMIN — Medication 100 MCG/HR: at 21:55

## 2024-09-12 ASSESSMENT — PAIN DESCRIPTION - PAIN TYPE: TYPE: ACUTE PAIN

## 2024-09-12 ASSESSMENT — FIBROSIS 4 INDEX: FIB4 SCORE: 1.03

## 2024-09-13 ENCOUNTER — APPOINTMENT (OUTPATIENT)
Dept: RADIOLOGY | Facility: MEDICAL CENTER | Age: 34
DRG: 871 | End: 2024-09-13
Attending: NURSE PRACTITIONER
Payer: OTHER GOVERNMENT

## 2024-09-13 LAB
ALBUMIN SERPL BCP-MCNC: 3.9 G/DL (ref 3.2–4.9)
ALBUMIN/GLOB SERPL: 1.6 G/DL
ALP SERPL-CCNC: 32 U/L (ref 30–99)
ALT SERPL-CCNC: 15 U/L (ref 2–50)
ANION GAP SERPL CALC-SCNC: 13 MMOL/L (ref 7–16)
ARTERIAL PATENCY WRIST A: ABNORMAL
AST SERPL-CCNC: 18 U/L (ref 12–45)
BASE EXCESS BLDA CALC-SCNC: -4 MMOL/L (ref -4–3)
BILIRUB SERPL-MCNC: 1 MG/DL (ref 0.1–1.5)
BODY TEMPERATURE: ABNORMAL DEGREES
BREATHS SETTING VENT: 20
BUN SERPL-MCNC: 9 MG/DL (ref 8–22)
CALCIUM ALBUM COR SERPL-MCNC: 8.5 MG/DL (ref 8.5–10.5)
CALCIUM SERPL-MCNC: 8.4 MG/DL (ref 8.5–10.5)
CHLORIDE SERPL-SCNC: 104 MMOL/L (ref 96–112)
CO2 BLDA-SCNC: 20 MMOL/L (ref 20–33)
CO2 SERPL-SCNC: 20 MMOL/L (ref 20–33)
CREAT SERPL-MCNC: 0.84 MG/DL (ref 0.5–1.4)
DELSYS IDSYS: ABNORMAL
ERYTHROCYTE [DISTWIDTH] IN BLOOD BY AUTOMATED COUNT: 40.3 FL (ref 35.9–50)
GFR SERPLBLD CREATININE-BSD FMLA CKD-EPI: 116 ML/MIN/1.73 M 2
GLOBULIN SER CALC-MCNC: 2.5 G/DL (ref 1.9–3.5)
GLUCOSE BLD STRIP.AUTO-MCNC: 141 MG/DL (ref 65–99)
GLUCOSE BLD STRIP.AUTO-MCNC: 150 MG/DL (ref 65–99)
GLUCOSE BLD STRIP.AUTO-MCNC: 168 MG/DL (ref 65–99)
GLUCOSE BLD STRIP.AUTO-MCNC: 179 MG/DL (ref 65–99)
GLUCOSE SERPL-MCNC: 165 MG/DL (ref 65–99)
HCO3 BLDA-SCNC: 19.4 MMOL/L (ref 17–25)
HCT VFR BLD AUTO: 39 % (ref 42–52)
HGB BLD-MCNC: 13.4 G/DL (ref 14–18)
HOROWITZ INDEX BLDA+IHG-RTO: 280 MM[HG]
LACTATE BLD-SCNC: 0.9 MMOL/L (ref 0.5–2)
MAGNESIUM SERPL-MCNC: 1.8 MG/DL (ref 1.5–2.5)
MCH RBC QN AUTO: 29.6 PG (ref 27–33)
MCHC RBC AUTO-ENTMCNC: 34.4 G/DL (ref 32.3–36.5)
MCV RBC AUTO: 86.3 FL (ref 81.4–97.8)
MODE IMODE: ABNORMAL
O2/TOTAL GAS SETTING VFR VENT: 30 %
PCO2 BLDA: 30.1 MMHG (ref 26–37)
PCO2 TEMP ADJ BLDA: 31.7 MMHG (ref 26–37)
PEEP END EXPIRATORY PRESSURE IPEEP: 8 CMH20
PH BLDA: 7.42 [PH] (ref 7.4–7.5)
PH TEMP ADJ BLDA: 7.4 [PH] (ref 7.4–7.5)
PHOSPHATE SERPL-MCNC: 2.6 MG/DL (ref 2.5–4.5)
PLATELET # BLD AUTO: 140 K/UL (ref 164–446)
PMV BLD AUTO: 9.4 FL (ref 9–12.9)
PO2 BLDA: 84 MMHG (ref 64–87)
PO2 TEMP ADJ BLDA: 91 MMHG (ref 64–87)
POTASSIUM SERPL-SCNC: 4.4 MMOL/L (ref 3.6–5.5)
PROT SERPL-MCNC: 6.4 G/DL (ref 6–8.2)
RBC # BLD AUTO: 4.52 M/UL (ref 4.7–6.1)
SAO2 % BLDA: 97 % (ref 93–99)
SCCMEC + MECA PNL NOSE NAA+PROBE: NEGATIVE
SODIUM SERPL-SCNC: 137 MMOL/L (ref 135–145)
SPECIMEN DRAWN FROM PATIENT: ABNORMAL
TIDAL VOLUME IVT: 400 ML
WBC # BLD AUTO: 9.4 K/UL (ref 4.8–10.8)

## 2024-09-13 PROCEDURE — 72156 MRI NECK SPINE W/O & W/DYE: CPT

## 2024-09-13 PROCEDURE — 82803 BLOOD GASES ANY COMBINATION: CPT

## 2024-09-13 PROCEDURE — 36600 WITHDRAWAL OF ARTERIAL BLOOD: CPT

## 2024-09-13 PROCEDURE — 700101 HCHG RX REV CODE 250: Performed by: STUDENT IN AN ORGANIZED HEALTH CARE EDUCATION/TRAINING PROGRAM

## 2024-09-13 PROCEDURE — A9579 GAD-BASE MR CONTRAST NOS,1ML: HCPCS | Mod: JZ | Performed by: NURSE PRACTITIONER

## 2024-09-13 PROCEDURE — 99291 CRITICAL CARE FIRST HOUR: CPT | Performed by: NURSE PRACTITIONER

## 2024-09-13 PROCEDURE — 94003 VENT MGMT INPAT SUBQ DAY: CPT

## 2024-09-13 PROCEDURE — 700102 HCHG RX REV CODE 250 W/ 637 OVERRIDE(OP): Performed by: STUDENT IN AN ORGANIZED HEALTH CARE EDUCATION/TRAINING PROGRAM

## 2024-09-13 PROCEDURE — 700111 HCHG RX REV CODE 636 W/ 250 OVERRIDE (IP): Performed by: STUDENT IN AN ORGANIZED HEALTH CARE EDUCATION/TRAINING PROGRAM

## 2024-09-13 PROCEDURE — 80053 COMPREHEN METABOLIC PANEL: CPT

## 2024-09-13 PROCEDURE — 700117 HCHG RX CONTRAST REV CODE 255: Mod: JZ | Performed by: NURSE PRACTITIONER

## 2024-09-13 PROCEDURE — 700102 HCHG RX REV CODE 250 W/ 637 OVERRIDE(OP): Performed by: NURSE PRACTITIONER

## 2024-09-13 PROCEDURE — 83605 ASSAY OF LACTIC ACID: CPT

## 2024-09-13 PROCEDURE — 82962 GLUCOSE BLOOD TEST: CPT | Mod: 91

## 2024-09-13 PROCEDURE — 97162 PT EVAL MOD COMPLEX 30 MIN: CPT

## 2024-09-13 PROCEDURE — 700111 HCHG RX REV CODE 636 W/ 250 OVERRIDE (IP): Performed by: NURSE PRACTITIONER

## 2024-09-13 PROCEDURE — 97167 OT EVAL HIGH COMPLEX 60 MIN: CPT

## 2024-09-13 PROCEDURE — 84100 ASSAY OF PHOSPHORUS: CPT

## 2024-09-13 PROCEDURE — A9270 NON-COVERED ITEM OR SERVICE: HCPCS | Performed by: STUDENT IN AN ORGANIZED HEALTH CARE EDUCATION/TRAINING PROGRAM

## 2024-09-13 PROCEDURE — 83735 ASSAY OF MAGNESIUM: CPT

## 2024-09-13 PROCEDURE — 72158 MRI LUMBAR SPINE W/O & W/DYE: CPT

## 2024-09-13 PROCEDURE — 94799 UNLISTED PULMONARY SVC/PX: CPT

## 2024-09-13 PROCEDURE — 700105 HCHG RX REV CODE 258: Performed by: STUDENT IN AN ORGANIZED HEALTH CARE EDUCATION/TRAINING PROGRAM

## 2024-09-13 PROCEDURE — 700105 HCHG RX REV CODE 258: Performed by: NURSE PRACTITIONER

## 2024-09-13 PROCEDURE — 770022 HCHG ROOM/CARE - ICU (200)

## 2024-09-13 PROCEDURE — 72157 MRI CHEST SPINE W/O & W/DYE: CPT

## 2024-09-13 PROCEDURE — 85027 COMPLETE CBC AUTOMATED: CPT

## 2024-09-13 RX ORDER — MAGNESIUM SULFATE HEPTAHYDRATE 40 MG/ML
2 INJECTION, SOLUTION INTRAVENOUS ONCE
Status: COMPLETED | OUTPATIENT
Start: 2024-09-13 | End: 2024-09-13

## 2024-09-13 RX ADMIN — PROPOFOL 20 MCG/KG/MIN: 10 INJECTION, EMULSION INTRAVENOUS at 12:54

## 2024-09-13 RX ADMIN — CEFTRIAXONE SODIUM 2000 MG: 10 INJECTION, POWDER, FOR SOLUTION INTRAVENOUS at 08:09

## 2024-09-13 RX ADMIN — DEXAMETHASONE SODIUM PHOSPHATE 10 MG: 4 INJECTION INTRA-ARTICULAR; INTRALESIONAL; INTRAMUSCULAR; INTRAVENOUS; SOFT TISSUE at 08:26

## 2024-09-13 RX ADMIN — INSULIN HUMAN 2 UNITS: 100 INJECTION, SOLUTION PARENTERAL at 01:05

## 2024-09-13 RX ADMIN — GADOTERIDOL 15 ML: 279.3 INJECTION, SOLUTION INTRAVENOUS at 18:06

## 2024-09-13 RX ADMIN — ENOXAPARIN SODIUM 40 MG: 100 INJECTION SUBCUTANEOUS at 01:07

## 2024-09-13 RX ADMIN — FAMOTIDINE 20 MG: 20 TABLET, FILM COATED ORAL at 18:55

## 2024-09-13 RX ADMIN — DEXAMETHASONE SODIUM PHOSPHATE 10 MG: 4 INJECTION INTRA-ARTICULAR; INTRALESIONAL; INTRAMUSCULAR; INTRAVENOUS; SOFT TISSUE at 14:13

## 2024-09-13 RX ADMIN — DEXAMETHASONE SODIUM PHOSPHATE 10 MG: 4 INJECTION INTRA-ARTICULAR; INTRALESIONAL; INTRAMUSCULAR; INTRAVENOUS; SOFT TISSUE at 02:42

## 2024-09-13 RX ADMIN — FAMOTIDINE 20 MG: 20 TABLET, FILM COATED ORAL at 08:09

## 2024-09-13 RX ADMIN — ACETAMINOPHEN 650 MG: 325 TABLET ORAL at 04:58

## 2024-09-13 RX ADMIN — CEFTRIAXONE SODIUM 2000 MG: 10 INJECTION, POWDER, FOR SOLUTION INTRAVENOUS at 18:57

## 2024-09-13 RX ADMIN — Medication 100 MCG/HR: at 22:52

## 2024-09-13 RX ADMIN — VANCOMYCIN HYDROCHLORIDE 1250 MG: 5 INJECTION, POWDER, LYOPHILIZED, FOR SOLUTION INTRAVENOUS at 20:57

## 2024-09-13 RX ADMIN — MAGNESIUM SULFATE HEPTAHYDRATE 2 G: 2 INJECTION, SOLUTION INTRAVENOUS at 10:58

## 2024-09-13 RX ADMIN — SENNOSIDES AND DOCUSATE SODIUM 2 TABLET: 50; 8.6 TABLET ORAL at 18:55

## 2024-09-13 RX ADMIN — PROPOFOL 40 MCG/KG/MIN: 10 INJECTION, EMULSION INTRAVENOUS at 16:00

## 2024-09-13 RX ADMIN — SODIUM CHLORIDE, POTASSIUM CHLORIDE, SODIUM LACTATE AND CALCIUM CHLORIDE: 600; 310; 30; 20 INJECTION, SOLUTION INTRAVENOUS at 21:00

## 2024-09-13 RX ADMIN — PROPOFOL 30 MCG/KG/MIN: 10 INJECTION, EMULSION INTRAVENOUS at 03:34

## 2024-09-13 RX ADMIN — DEXAMETHASONE SODIUM PHOSPHATE 10 MG: 4 INJECTION INTRA-ARTICULAR; INTRALESIONAL; INTRAMUSCULAR; INTRAVENOUS; SOFT TISSUE at 20:46

## 2024-09-13 RX ADMIN — Medication 100 MCG/HR: at 12:57

## 2024-09-13 RX ADMIN — SENNOSIDES AND DOCUSATE SODIUM 2 TABLET: 50; 8.6 TABLET ORAL at 08:08

## 2024-09-13 RX ADMIN — VANCOMYCIN HYDROCHLORIDE 1250 MG: 5 INJECTION, POWDER, LYOPHILIZED, FOR SOLUTION INTRAVENOUS at 05:10

## 2024-09-13 RX ADMIN — VANCOMYCIN HYDROCHLORIDE 1250 MG: 5 INJECTION, POWDER, LYOPHILIZED, FOR SOLUTION INTRAVENOUS at 14:12

## 2024-09-13 RX ADMIN — INSULIN HUMAN 2 UNITS: 100 INJECTION, SOLUTION PARENTERAL at 08:09

## 2024-09-13 RX ADMIN — FENTANYL CITRATE 50 MCG: 50 INJECTION, SOLUTION INTRAMUSCULAR; INTRAVENOUS at 05:32

## 2024-09-13 RX ADMIN — FENTANYL CITRATE 50 MCG: 50 INJECTION, SOLUTION INTRAMUSCULAR; INTRAVENOUS at 16:28

## 2024-09-13 RX ADMIN — SODIUM CHLORIDE, POTASSIUM CHLORIDE, SODIUM LACTATE AND CALCIUM CHLORIDE: 600; 310; 30; 20 INJECTION, SOLUTION INTRAVENOUS at 08:41

## 2024-09-13 RX ADMIN — ENOXAPARIN SODIUM 40 MG: 100 INJECTION SUBCUTANEOUS at 18:54

## 2024-09-13 ASSESSMENT — PAIN DESCRIPTION - PAIN TYPE
TYPE: ACUTE PAIN

## 2024-09-13 ASSESSMENT — COGNITIVE AND FUNCTIONAL STATUS - GENERAL
CLIMB 3 TO 5 STEPS WITH RAILING: TOTAL
HELP NEEDED FOR BATHING: TOTAL
PERSONAL GROOMING: TOTAL
DAILY ACTIVITIY SCORE: 6
DRESSING REGULAR UPPER BODY CLOTHING: TOTAL
MOVING TO AND FROM BED TO CHAIR: A LOT
STANDING UP FROM CHAIR USING ARMS: A LOT
SUGGESTED CMS G CODE MODIFIER DAILY ACTIVITY: CN
EATING MEALS: TOTAL
DRESSING REGULAR LOWER BODY CLOTHING: TOTAL
TURNING FROM BACK TO SIDE WHILE IN FLAT BAD: A LITTLE
TOILETING: TOTAL
WALKING IN HOSPITAL ROOM: A LOT
MOBILITY SCORE: 12
MOVING FROM LYING ON BACK TO SITTING ON SIDE OF FLAT BED: A LOT
SUGGESTED CMS G CODE MODIFIER MOBILITY: CL

## 2024-09-13 ASSESSMENT — GAIT ASSESSMENTS: GAIT LEVEL OF ASSIST: UNABLE TO PARTICIPATE

## 2024-09-13 NOTE — ASSESSMENT & PLAN NOTE
This is Sepsis Present on admission  SIRS criteria identified on my evaluation include: Tachycardia, with heart rate greater than 90 BPM  Clinical indicators of end organ dysfunction include Toxic Metabolic Encephalopathy and GCS < 15  Source is Bacterial Meningitis  Sepsis protocol initiated  Crystalloid Fluid Administration: Resuscitation volume of 0 ordered. Reason that resuscitation volume of less than 30ml/kg was ordered  His hemodynamics improved with intubation and he does not require fluids at this time.  PRN 30cc/kg LR bolus ordered for MAP <65 or SBP <90  IV antibiotics as appropriate for source of sepsis  Reassessment: I have reassessed the patient's hemodynamic status    Continue ABX per ID recommendations.  Continue steroids  Follow cultures  Hemodynamically stable off pressors

## 2024-09-13 NOTE — ED NOTES
Pt transported to RICU with ALCS RN and RT connected to monitor and vent. Pt's wife called and updated on room assignment and POC.

## 2024-09-13 NOTE — ED PROVIDER NOTES
CHIEF COMPLAINT  Chief Complaint   Patient presents with    ALOC       LIMITATION TO HISTORY   Select: Acute encephalopathy    HPI    Kwesi Rivera is a 34 y.o. male who presents to the Emergency Department as a transfer from [place] for evaluation of a encephalopathy.  Patient reportedly is a  at the [employer] [place].  He was at home when he had developed a headache around noon he took a nap and then 1 when he woke up at 2 PM per his wife he was confused not acting himself.  He was brought to the outside emergency department where he had a CTA head  did not show any obvious occlusion and was transferred here for further evaluation.    OUTSIDE HISTORIAN(S):  Select: [facility] reports that the patient did receive fentanyl prior to coming    EXTERNAL RECORDS REVIEWED  Select: Other reviewed external records including EMS records, reviewed outside CT      PAST MEDICAL HISTORY  No past medical history on file.  .    SURGICAL HISTORY  No past surgical history on file.      FAMILY HISTORY  No family history on file.       SOCIAL HISTORY  Social History     Socioeconomic History    Marital status: Single     Spouse name: Not on file    Number of children: Not on file    Years of education: Not on file    Highest education level: Bachelor's degree (e.g., BA, AB, BS)   Occupational History    Not on file   Tobacco Use    Smoking status: Never    Smokeless tobacco: Never   Substance and Sexual Activity    Alcohol use: Not on file    Drug use: Not on file    Sexual activity: Not on file   Other Topics Concern    Not on file   Social History Narrative    Not on file     Social Determinants of Health     Financial Resource Strain: Low Risk  (7/31/2022)    Overall Financial Resource Strain (CARDIA)     Difficulty of Paying Living Expenses: Not hard at all   Food Insecurity: No Food Insecurity (7/31/2022)    Hunger Vital Sign     Worried About Running Out of Food in the Last Year: Never true     Ran Out of Food  "in the Last Year: Never true   Transportation Needs: No Transportation Needs (7/31/2022)    PRAPARE - Transportation     Lack of Transportation (Medical): No     Lack of Transportation (Non-Medical): No   Physical Activity: Sufficiently Active (7/31/2022)    Exercise Vital Sign     Days of Exercise per Week: 5 days     Minutes of Exercise per Session: 60 min   Stress: No Stress Concern Present (7/31/2022)    [employer] - Occupational Stress Questionnaire     Feeling of Stress : Not at all   Social Connections: Moderately Integrated (7/31/2022)    Social Connection and Isolation Panel [NHANES]     Frequency of Communication with Friends and Family: More than three times a week     Frequency of Social Gatherings with Friends and Family: Once a week     Attends Mu-ism Services: More than 4 times per year     Active Member of Clubs or Organizations: No     Attends Club or Organization Meetings: Never     Marital Status: Living with partner   Intimate Partner Violence: Not on file   Housing Stability: Low Risk  (7/31/2022)    Housing Stability Vital Sign     Unable to Pay for Housing in the Last Year: No     Number of Places Lived in the Last Year: 1     Unstable Housing in the Last Year: No         CURRENT MEDICATIONS  No current facility-administered medications on file prior to encounter.     No current outpatient medications on file prior to encounter.           ALLERGIES  No Known Allergies    PHYSICAL EXAM  VITAL SIGNS:Pulse (!) 102   Temp 36.9 °C (98.4 °F) (Temporal)   Resp (!) 22   Ht 1.702 m (5' 7\")   Wt 79.4 kg (175 lb)   SpO2 91%   BMI 27.41 kg/m²       VITALS - vital signs documented prior to this note have been reviewed and noted,  GENERAL awake alert GCS of 9 rolling around on the bed localizes to painful stimulus nonverbal  HEENT - normocephalic, atraumatic,   NECK - supple, no meningismus, full active range of motion, trachea midline  CARDIOVASCULAR - regular " rate/rhythm, no murmurs/gallops/rubs  PULMONARY - no respiratory distress, speaking in full sentences, clear to  auscultation bilaterally, no wheezing/ronchi/rales, no accessory muscle use  GASTROINTESTINAL - soft, non-tender, non-distended, no rebound, guarding,  or peritonitis  GENITOURINARY -normal external genitalia  NEUROLOGIC -patient is moving all extremities as eyes open spontaneously is nonverbal he localizes to pain.      DIAGNOSTIC STUDIES / PROCEDURES  EKG  I have independently interpreted this EKG  Interpreted below by myself as sinus tachycardia rate of 110 no STEMI pattern normal axis.  Interpretation sinus tachycardia     Report   Date Value Ref Range Status   2024       [facility] Emergency Dept.    Test Date:  2024  Pt Name:    JENNIFER COLEY                 Department: ER  MRN:        [id number]                      Room:        01  Gender:     Male                         Technician: [id number]  :        1990                   Requested By:JAYY BHATT  Order #:    [id number]                    Reading MD:    Measurements  Intervals                                Axis  Rate:       110                          P:          64  NY:         196                          QRS:        50  QRSD:       104                          T:          50  QT:         331  QTc:        448    Interpretive Statements  Sinus tachycardia  Consider right atrial enlargement  RSR' in V1 or V2, probably normal variant  Minimal ST depression, anterolateral leads  Baseline wander in lead(s) I,III,aVL,V2  No previous ECG available for comparison                LABS  Labs Reviewed - No data to display    Mild anion gap metabolic acidosis mild hyperglycemia otherwise nonactionable UDS is positive for opioids of the patient did receive opioids at the outside facility as well as by EMS  RADIOLOGY  I have independently interpreted the diagnostic imaging associated with this visit and am waiting  "the final reading from the radiologist.   My preliminary interpretation is as follows: CT head from outside facility showed no obvious bleed      Radiologist interpretation:   No orders to display        COURSE & MEDICAL DECISION MAKING    ED COURSE:        INTERVENTIONS BY ME:  Medications - No data to display    Intubation Procedure Note    Indication: airway protection    Consent: Unable to be obtained due to the emergent nature of this procedure.    Medications Used: etomidate intravenously and rocuronium                                                                          intravenously    Procedure: The patient was placed in the appropriate position.  Cricoid pressure was not required. glidescope was used. Intubation was performed by video laryngoscopy, using a glidescope an 8.0 cuffed endotracheal tube.  The cuff was then inflated and the tube was secured appropriately at a distance of 24 cm to the dental ridge.  Initial confirmation of placement included bilateral breath sounds.  A chest x-ray to verify correct placement of the tube showed appropriate tube position.    The patient tolerated the procedure well.     Complications:   None         Risks and benefits: risks, benefits and alternatives were discussed  Patient understanding: patient states understanding of the procedure being performed  Patient consent: the patient's understanding of the procedure matches consent given  Procedure consent: procedure consent matches procedure scheduled  Relevant documents: relevant documents present and verified  Test results: test results available and properly labeled  Site marked: the operative site was marked  Imaging studies: imaging studies available  Patient identity confirmed: arm band and verbally with patient  Time out: Immediately prior to procedure a \"time out\" was called to verify the correct patient, procedure, equipment, support staff and site/side marked as required.  Anesthesia: local " infiltration  Local anesthetic: lidocaine 1% without epinephrine  Anesthetic total: 4 ml  Patient sedated: no  Preparation: Patient was prepped and draped in the usual sterile fashion.  Lumbar space: L4-L5 interspace  Patient's position: lateral decubitus  Needle gauge: 22  Number of attempts: 3, atraumatic  Fluid appearance: Milky  Tubes of fluid: 4  Total volume: 4 ml  Post-procedure: site cleaned and pressure dressing applied  Patient tolerance: Patient tolerated the procedure well with no immediate complications.  Comments: Nontraumatic      Critical care    Critical Care Procedure Note    Total critical care time: Approximately 36 minutes    Upon my assess due to a high probability of clinically significant, life threatening deterioration, secondary to acute encephalopathy the patient required my direct attention and intervention. This critical care time included obtaining a history; examining the patient; pulse oximetry; ordering and review of studies; arranging urgent treatment with development of a management plan; evaluation of patient's response to treatment; frequent reassessment; and, discussions with other providers.    was exclusive of separately billable procedures and treating other patients and teaching time.    INITIAL ASSESSMENT, COURSE AND PLAN  Care Narrative: Patient presented from an outside facility for evaluation of acute encephalopathy.  This began abruptly at around 2 PM this afternoon.  Upon arrival in the emergency department the patient was encephalopathic with a GCS of 9.  I did call and speak with Dr. ASTRID Chatman who immediately came to the emergency evaluated the patient at bedside.  Recommendations were made for MRIs with and without contrast as well as MRI venogram and lumbar puncture.  Labs were reviewed from the outside facility did not show any significant metabolic derangements.  I did repeat blood work here.  Patient was not redirectable to commands he was rolling around on the  bed,agitated, given that he was to go to MRI, with his acute encephalopathy, I did not feel he could adequately protect his airway during the procedure thus he was intubated for airway protection.  A lumbar puncture was performed with some difficulty fortunately was obtainable to obtain CSF which did appear milky as such at this point did elect to cover the patient empirically for a possible bacterial meningitis.  Did speak with the intensivist Dr. Bowser and the patient will be admitted to the ICU in guarded condition           ADDITIONAL PROBLEM LIST    DISPOSITION AND DISCUSSIONS  I have discussed management of the patient with the following physicians and MANUEL's: Neurologist intensivist    Discussion of management with other QHP or appropriate source(s): Pharmacy for sedation and RT for intubation          FINAL DIAGNOSIS  #1 acute encephalopathy         Electronically signed by: Roque Goodman DO ,6:09 PM 09/12/24

## 2024-09-13 NOTE — HOSPITAL COURSE
"Kwesi Rivera is a 34 year old male with no significant PMH who is an active  who presented as a transfer from [facility] 9/12 with altered mental status. He presented to OSH with headaches, nonverbal, with GCS 9. No focal deficits and unable to follow commands. CT/CTA showed some diffuse edema bilateral hemispheres. He transferred to [facility] for further Neurological evaluation.   On arrival here he was intubated and underwent LP with \"milky\" CSF.   PCR showed Strep Pneumo with > 15,000 WBC and > 600 protein on CSF. He was admitted to ICU and started on Vanco, Ceftriaxone, and steroids.  Brain MRI without evidence of venous thrombosis.  9/13 - VD #2. ID consult - Continue IV vancomycin and IV ceftriaxone. Following commands.  9/14 - VD #3. Extubated  "

## 2024-09-13 NOTE — CARE PLAN
Problem: Ventilation  Goal: Ability to achieve and maintain unassisted ventilation or tolerate decreased levels of ventilator support  Description: Target End Date:  4 days     Document on Vent flowsheet    1.  Support and monitor invasive and noninvasive mechanical ventilation  2.  Monitor ventilator weaning response  3.  Perform ventilator associated pneumonia prevention interventions  4.  Manage ventilation therapy by monitoring diagnostic test results  Note:   Ventilator Daily Summary    Vent Day #2  Airway: 8.0 @ 24    Ventilator settings: 20/400/8+30%  Weaning trials: fails SAT  Respiratory Procedures: none    Plan: Continue current ventilator settings and wean mechanical ventilation as tolerated per physician orders.

## 2024-09-13 NOTE — ED TRIAGE NOTES
Chief Complaint   Patient presents with    ALOC     Pt BIB [facility] from [facility]. Pt complaining of headache x 1 day to wife, at 1200 pt took nap and at 1400 wife woke pt up and found him to be altered. Pt non-verbal GCS 9-10. Pt moving all extremities and localizing to pain, no verbal response or ability to follow commands. Stroke scans at sending facility concerning for possible edema.     Pt transfer for neurology consult.     ERP at bedside. Pt placed on monitor. Blood drawn.

## 2024-09-13 NOTE — ED NOTES
Assist RN: to MRI with full vitals monitoring, MRI compatible pumps for fentanyl drip at 100mcg/hr (2ml/hr) and propofol drip at 80 mcg/kg/min (31.7 ml/hr). Accompanied by RT and  medic.

## 2024-09-13 NOTE — PROGRESS NOTES
Child life consult per MANE SAMANIEGO   Patient's 8 year old daughter (April) here to see patient. Met with mom and daughter in lobby before escorting them to patient's room. Age appropriate language utilized to explain patient's condition and medical interventions. Emotional support provided for mom and daughter. Activities provided for daughter to aid with long stay. Will follow as needed.  Thank you for the consult. Child life can be reached on Voalte if needed.

## 2024-09-13 NOTE — PROGRESS NOTES
"Pharmacy Vancomycin Kinetics Note for 9/12/2024     34 y.o. male on Vancomycin day # 1     Vancomycin Indication (Trough based Dosing): CNS infection (goal of 18-22)    Provider specified end date: 09/17/24    Active Antibiotics (From admission, onward)      Ordered     Ordering Provider       Thu Sep 12, 2024  8:19 PM    09/12/24 2019  vancomycin (Vancocin) 1,250 mg in  mL IVPB  (vancomycin (VANCOCIN) IV (LD + Maintenance))  EVERY 8 HOURS         Mauri Bowser M.D.       Thu Sep 12, 2024  8:02 PM    09/12/24 2002  acyclovir (Zovirax) 650 mg in  mL IVPB  EVERY 8 HOURS         Mauri Bowser M.D.    09/12/24 2002  cefTRIAXone (Rocephin) syringe 2,000 mg  2 TIMES DAILY         Mauri Bowser M.D.    09/12/24 2002  MD Alert...Vancomycin per Pharmacy  (MD Alert...Vancomycin per Pharmacy)  PHARMACY TO DOSE        Question:  Indication(s) for vancomycin?  Answer:  Central nervous system infection    Mauri Bowser M.D.       Thu Sep 12, 2024  7:30 PM    09/12/24 1930  cefTRIAXone (Rocephin) injection 2,000 mg  ONCE         Roque A. Diprinzio, D.O.    09/12/24 1930  vancomycin (Vancocin) 2,000 mg in  mL IVPB  (vancomycin (VANCOCIN) IV (LD + Maintenance))  ONCE         Roque A. Diprinzio, D.O.            Dosing Weight: 79.4 kg (175 lb 0.7 oz)      Admission History: Admitted on 9/12/2024 for Encephalitis [G04.90]  Pertinent history: Patient arrives to ER with altered mental status. Per report, he was complaining of a headache for 1 day and took a nap today, when he woke up altered. LP done in ER which was \"cloudy\" per staff. Empiric meningitic antibiotics initiated.    Allergies:     Patient has no known allergies.     Pertinent cultures to date:     Results       Procedure Component Value Units Date/Time    MRSA By PCR (Amp) [[id number]]     Order Status: Sent Specimen: Respirate from Nares     BLOOD CULTURE [[id number]]     Order Status: Sent Specimen: Blood from Peripheral     BLOOD CULTURE " "[[id number]]     Order Status: Sent Specimen: Blood from Peripheral     CSF CULTURE [[id number]] Collected: 24    Order Status: Sent Specimen: CSF from Tap Updated: 24    URINALYSIS [[id number]]  (Abnormal) Collected: 24    Order Status: Completed Specimen: Urine Updated: 24     Color Orange     Character Clear     Specific Gravity >=1.045     Ph 5.0     Glucose 500 mg/dL      Ketones 80 mg/dL      Protein Negative mg/dL      Bilirubin Negative     Urobilinogen, Urine 0.2     Nitrite Negative     Leukocyte Esterase Negative     Occult Blood Moderate     Micro Urine Req Microscopic    URINALYSIS [[id number]]     Order Status: Canceled Specimen: Urine             Labs:     Estimated Creatinine Clearance: 113.2 mL/min (by C-G formula based on SCr of 0.92 mg/dL).  Recent Labs     24   WBC 8.8   NEUTSPOLYS 93.10*     Recent Labs     24   BUN 12   CREATININE 0.92   ALBUMIN 4.2     No intake or output data in the 24 hours ending 24   /57   Pulse 73   Temp 36.9 °C (98.4 °F) (Temporal)   Resp 20   Ht 1.702 m (5' 7\")   Wt 79.4 kg (175 lb)   SpO2 96%  Temp (24hrs), Av.9 °C (98.4 °F), Min:36.9 °C (98.4 °F), Max:36.9 °C (98.4 °F)      List concerns for Vancomycin clearance:     None      A/P:     -  Vancomycin dose: 25 mg/kg LD + 1250 mg Q8h (18 mg/kg/dose)    -  Next vancomycin level(s):    - at steady state; defer to floor pharmacist - not ordered    -  Comments: Empiric meningitic antibiotics initiated. Minimal concerns for toxicity/accumulation at this time. LP done in the ER, awaiting finalized CSF result and culture. Pharmacy will continue to monitor and adjust as needed.    Evgeny Carter, PharmD    "

## 2024-09-13 NOTE — PROGRESS NOTES
"Critical Care Progress Note    Date of admission  2024    Chief Complaint  Altered Mental Status    Hospital Course  Kwesi Rivera is a 34 year old male with no significant PMH who is an active  who presented as a transfer from [facility]  with altered mental status. He presented to OSH with headaches, nonverbal, with GCS 9. No focal deficits and unable to follow commands. CT/CTA showed some diffuse edema bilateral hemispheres. He transferred to [facility] for further Neurological evaluation.   On arrival here he was intubated and underwent LP with \"milky\" CSF.   PCR showed Strep Pneumo with > 15,000 WBC and > 600 protein on CSF. He was admitted to ICU and started on Vanco, Ceftriaxone, and steroids.    Interval Problem Update  Reviewed last 24 hour events:  MRI brain without evidence of venous thrombosis  Tmax 102.4 --> 99   SB/SR 55-65  -120's. No pressors.  Neuro: following, HARRISON 3/5. PERRLA 2mm. Upward gaze.  RASS: +3 to -4. Propofol  Vent day #2: AVPC 20/400/8/30%  AB.40/32/91  SAT/SBT: yes, failed. No SBT  Fent @ 100, Prop @ 30  NPO - OG. BM PTA. Start tube feedings today  I/O: 200/325  Pepcid, Lovenox, Vanco, C3  PIV x 4, Cordero  Mobility  1 - not eligible to advance    Review of Systems  Review of Systems   Unable to perform ROS: Intubated        Vital Signs for last 24 hours   Temp:  [36.2 °C (97.1 °F)-37.6 °C (99.7 °F)] 36.2 °C (97.1 °F)  Pulse:  [] 47  Resp:  [16-37] 18  BP: (110-203)/() (P) 112/60  SpO2:  [91 %-100 %] 99 %    Hemodynamic parameters for last 24 hours       Respiratory Information for the last 24 hours  Vent Mode: APVCMV  Rate (breaths/min): 20  Vt Target (mL): 400  PEEP/CPAP: 8  MAP: 11  Control VTE (exp VT): 400    Physical Exam   Physical Exam  Vitals and nursing note reviewed. Exam conducted with a chaperone present.   Constitutional:       General: He is not in acute distress.     Appearance: He is ill-appearing.      Interventions: He is " sedated and intubated.   HENT:      Head: Normocephalic.      Mouth/Throat:      Mouth: Mucous membranes are moist.   Eyes:      Extraocular Movements: Extraocular movements intact.      Pupils: Pupils are equal, round, and reactive to light.   Cardiovascular:      Rate and Rhythm: Normal rate and regular rhythm.      Pulses: Normal pulses.   Pulmonary:      Effort: Pulmonary effort is normal. No respiratory distress. He is intubated.   Abdominal:      General: There is no distension.      Palpations: Abdomen is soft.      Tenderness: There is no abdominal tenderness. There is no guarding or rebound.   Musculoskeletal:         General: Normal range of motion.      Cervical back: Normal range of motion and neck supple.   Skin:     General: Skin is warm and dry.      Capillary Refill: Capillary refill takes less than 2 seconds.   Neurological:      GCS: GCS eye subscore is 1. GCS verbal subscore is 1. GCS motor subscore is 6.      Comments: 8T   Psychiatric:      Comments: Intubated/Sedated         Medications  Current Facility-Administered Medications   Medication Dose Route Frequency Provider Last Rate Last Admin    MD Alert...Vancomycin per Pharmacy   Other PHARMACY TO DOSE Mauri Bowser M.D.        cefTRIAXone (Rocephin) syringe 2,000 mg  2,000 mg Intravenous BID Mauri Bowser M.D.   2,000 mg at 09/13/24 0809    fentaNYL (Sublimaze) injection 50 mcg  50 mcg Intravenous Q15 MIN PRN Mauri Bowser M.D.   50 mcg at 09/13/24 0532    And    fentaNYL (Sublimaze) injection 100 mcg  100 mcg Intravenous Q15 MIN PRN Mauri Bowser M.D.        And    fentaNYL (SUBLIMAZE) 50 mcg/mL in 50mL (Continuous Infusion)   Intravenous Continuous Mauri Bowser M.D. 2 mL/hr at 09/13/24 1257 100 mcg/hr at 09/13/24 1257    And    propofol (DIPRIVAN) injection  0-80 mcg/kg/min (Ideal) Intravenous Continuous Mauri Bowser M.D. 11.9 mL/hr at 09/13/24 1335 30 mcg/kg/min at 09/13/24 1335    Respiratory Therapy Consult   Nebulization  Continuous RT Mauri Bowser M.D.        acetaminophen (Tylenol) tablet 650 mg  650 mg Enteral Tube Q6HRS PRN Mauri Bowser M.D.   650 mg at 09/13/24 0458    senna-docusate (Pericolace Or Senokot S) 8.6-50 MG per tablet 2 Tablet  2 Tablet Enteral Tube BID Mauri Bowser M.D.   2 Tablet at 09/13/24 0808    And    polyethylene glycol/lytes (Miralax) Packet 1 Packet  1 Packet Enteral Tube QDAY PRN Mauri Bowser M.D.        hydrALAZINE (Apresoline) injection 10 mg  10 mg Intravenous Q4HRS PRN Mauri Bowser M.D.        labetalol (Normodyne/Trandate) injection 10 mg  10 mg Intravenous Q4HRS PRN Mauri Bowser M.D.        famotidine (Pepcid) tablet 20 mg  20 mg Enteral Tube Q12HRS Mauri Bowser M.D.   20 mg at 09/13/24 0809    Or    famotidine (Pepcid) injection 20 mg  20 mg Intravenous Q12HRS Mauri Bowser M.D. MD Alert...ICU Electrolyte Replacement per Pharmacy   Other PHARMACY TO DOSE Mauri Bowser M.D.        lidocaine (Xylocaine) 1 % injection 2 mL  2 mL Tracheal Tube Q30 MIN PRN Mauri Bowser M.D.        dexamethasone (Decadron) injection 10 mg  10 mg Intravenous Q6HRS Mauri Bowser M.D.   10 mg at 09/13/24 1413    vancomycin (Vancocin) 1,250 mg in  mL IVPB  1,250 mg Intravenous Q8HR Mauri Bowser M.D. 125 mL/hr at 09/13/24 1412 1,250 mg at 09/13/24 1412    insulin regular (HumuLIN R,NovoLIN R) injection  2-9 Units Subcutaneous Q6HRS Yeni L. Latona   2 Units at 09/13/24 0809    And    dextrose 50% (D50W) injection 25 g  25 g Intravenous Q15 MIN PRN Yeni LPromise Latona        lactated ringers infusion   Intravenous Continuous Yeni L. Latona 100 mL/hr at 09/13/24 1000 Rate Verify at 09/13/24 1000    ondansetron (Zofran) syringe/vial injection 4 mg  4 mg Intravenous Q6HRS PRN Yeni Garcia        enoxaparin (Lovenox) inj 40 mg  40 mg Subcutaneous DAILY AT 1800 Mauri Bowser M.D.   40 mg at 09/13/24 0107    LR (Bolus) infusion 2,382 mL  30 mL/kg Intravenous Once PRN Mauri Bowser M.D.      "      Fluids    Intake/Output Summary (Last 24 hours) at 9/13/2024 1538  Last data filed at 9/13/2024 1400  Gross per 24 hour   Intake 1473.14 ml   Output 3025 ml   Net -1551.86 ml       Laboratory  Recent Labs     09/12/24  2203 09/13/24  0444   ISTATAPH 7.294* 7.418   ISTATAPCO2 39.7* 30.1   ISTATAPO2 89* 84   ISTATATCO2 20 20   XYCFKHL3QVB 96 97   ISTATARTHCO3 19.3 19.4   ISTATARTBE -7* -4   ISTATTEMP 37.5 C 100.8 F   ISTATFIO2 30 30   ISTATSPEC Arterial Arterial   ISTATAPHTC 7.287* 7.400   PSGAFMOU3SJ 92* 91*         Recent Labs     09/12/24  1803 09/13/24  0805   SODIUM 136 137   POTASSIUM 3.8 4.4   CHLORIDE 101 104   CO2 18* 20   BUN 12 9   CREATININE 0.92 0.84   MAGNESIUM  --  1.8   PHOSPHORUS  --  2.6   CALCIUM 8.7 8.4*     Recent Labs     09/12/24  1803 09/13/24  0805   ALTSGPT 16 15   ASTSGOT 21 18   ALKPHOSPHAT 39 32   TBILIRUBIN 1.2 1.0   GLUCOSE 192* 165*     Recent Labs     09/12/24  1803 09/13/24  0805   WBC 8.8 9.4   NEUTSPOLYS 93.10*  --    LYMPHOCYTES 3.10*  --    MONOCYTES 3.30  --    EOSINOPHILS 0.00  --    BASOPHILS 0.20  --    ASTSGOT 21 18   ALTSGPT 16 15   ALKPHOSPHAT 39 32   TBILIRUBIN 1.2 1.0     Recent Labs     09/12/24  1803 09/13/24  0805   RBC 4.97 4.52*   HEMOGLOBIN 14.6 13.4*   HEMATOCRIT 43.6 39.0*   PLATELETCT 178 140*   PROTHROMBTM 13.3  --    INR 1.00  --        Imaging  MRI:   Reviewed    Assessment/Plan  * Streptococcus pneumoniae meningitis- (present on admission)  Assessment & Plan  -presented with AMS and headaches  -LP done in ED \"milky\" showed greater than 15,000 WBC and greater than 600 protein  -PCR positive Streptococcus pneumoniae  -ID consulted today -continue ABX  -Serial neurologic exams  -Patient also complained of back pain per wife.  CT C-spine/T-spine/L-spine with and without pending    Sepsis due to Streptococcus pneumoniae with encephalopathy without septic shock (HCC)  Assessment & Plan  This is Sepsis Present on admission  SIRS criteria identified on my " evaluation include: Tachycardia, with heart rate greater than 90 BPM  Clinical indicators of end organ dysfunction include Toxic Metabolic Encephalopathy and GCS < 15  Source is Bacterial Meningitis  Sepsis protocol initiated  Crystalloid Fluid Administration: Resuscitation volume of 0 ordered. Reason that resuscitation volume of less than 30ml/kg was ordered  His hemodynamics improved with intubation and he does not require fluids at this time.  PRN 30cc/kg LR bolus ordered for MAP <65 or SBP <90  IV antibiotics as appropriate for source of sepsis  Reassessment: I have reassessed the patient's hemodynamic status    Continue ABX per ID recommendations.  Continue steroids  Follow cultures  Hemodynamically stable off pressors    On mechanically assisted ventilation (HCC)  Assessment & Plan  -Intubated for altered mental status  -Intubation date 9/12  -Ventilator dependent respiratory failure  -Modify ventilator to optimize oxygenation, acid-base balance and ventilation  -CXR as indicated: monitor lung volumes and tube/line placement  -HOB > 30  -Titrate FiO2 to keep sats greater than 92%  -Chlorhexidine  -goal CO2 35-40  -Daily awakening and SBT trials unless contraindicated  -ABCDEF bundle  -I am actively adjusting ventilator based on clinical indicators and ABG's         VTE:  Lovenox  Ulcer: H2 Antagonist  Lines: Cordero Catheter  Ongoing indication addressed    I have performed a physical exam and reviewed and updated ROS and Plan today (9/13/2024). In review of yesterday's note (9/12/2024), there are no changes except as documented above.     Discussed patient condition and risk of morbidity and/or mortality with Family, RN, RT, Pharmacy, , Patient, neurology, and my attending Dr. Doewll  The patient remains critically ill.  Critical care time = 66 minutes in directly providing and coordinating critical care and extensive data review.  No time overlap and excludes procedures.    Please note that this  dictation was created using voice recognition software. I have made every reasonable attempt to correct obvious errors, but there may be errors of grammar and possibly content that I did not discover before finalizing the note.    NAOMI Millan.

## 2024-09-13 NOTE — H&P
"Critical Care History & Physical    Date of consult: 09/12/24    Referring Physician  Mauri Bowser M.D.    Reason for Consultation  Chief Complaint   Patient presents with    ALOC       History of Presenting Illness  34 y.o. male who has no past medical history, he is a  [employer] and flies [employer] for the .  He has been complaining of an ongoing headache for about 1 day, he laid down to take a nap today and around 2 PM woke up and was significantly altered.  He presented to the [facility] emergency department, nonverbal with a GCS of roughly 9.  No focal deficits were noted, he was unable to follow commands.  Neurology was consulted and he underwent CT head and CTA.  There was no obvious abnormalities noted aside from some potential diffuse edema in bilateral hemispheres that was difficult to fully ascertain.  Given this he was transferred to [facility].    On arrival there was concern for possible encephalitis/meningitis versus potential cerebral venous sinus thrombosis.  Due to his altered mental status and inability to follow commands along with his distress he was intubated to facilitate these procedures.  Lumbar puncture was performed by the emergency physician who noted that it looked \"somewhat milky\".  MRI is pending at this time.  We have initiated empiric meningitis coverage with vancomycin, ceftriaxone and acyclovir and he will be admitted to the ICU.      Code Status  Full Code    Review of Systems  Review of Systems   Unable to perform ROS: Critical illness       Past Medical History  No past medical history    Surgical History  No past surgical history    Family History  Reviewed and not pertinent    Social History   reports that he has never smoked. He has never used smokeless tobacco.    Medications  Home Medications       Reviewed by Bianka Hoff (Pharmacy Tech) on 09/12/24 at 1845  Med List Status: Complete     Medication Last Dose Status   acetaminophen (TYLENOL) 500 MG " Tab 9/12/2024 Active   ibuprofen (ADVIL) 200 MG Tab 9/12/2024 Active   Melatonin 5 MG Chew Tab 1~2 DAYS AGO Active   Non Formulary Request 9/10/2024 Active                  Audit from Redirected Encounters    **Home medications have not yet been reviewed for this encounter**         Allergies  No Known Allergies      Vital Signs last 24 hours  Temp:  [36.9 °C (98.4 °F)-37.6 °C (99.7 °F)] 37.6 °C (99.7 °F)  Pulse:  [] 70  Resp:  [17-29] 17  BP: (120-203)/() 120/69  SpO2:  [91 %-100 %] 99 %      Physical Exam  Physical Exam  Vitals and nursing note reviewed. Exam conducted with a chaperone present.   Constitutional:       General: He is not in acute distress.     Appearance: He is ill-appearing.      Interventions: He is sedated and intubated.   HENT:      Head: Normocephalic.      Mouth/Throat:      Mouth: Mucous membranes are moist.   Eyes:      Extraocular Movements: Extraocular movements intact.   Cardiovascular:      Rate and Rhythm: Normal rate and regular rhythm.      Pulses: Normal pulses.   Pulmonary:      Effort: Pulmonary effort is normal. No respiratory distress. He is intubated.   Abdominal:      General: There is no distension.      Palpations: Abdomen is soft.      Tenderness: There is no abdominal tenderness. There is no guarding or rebound.   Musculoskeletal:         General: Normal range of motion.      Cervical back: Normal range of motion and neck supple.   Skin:     General: Skin is warm and dry.      Capillary Refill: Capillary refill takes less than 2 seconds.   Neurological:      GCS: GCS eye subscore is 1. GCS verbal subscore is 1. GCS motor subscore is 1.      Comments: Examined after intubation and paralysis           Fluids  No intake or output data in the 24 hours ending 09/12/24 6599      Laboratory  Recent Results (from the past 48 hour(s))   CBC WITH DIFFERENTIAL    Collection Time: 09/12/24  6:03 PM   Result Value Ref Range    WBC 8.8 4.8 - 10.8 K/uL    RBC 4.97 4.70 - 6.10  M/uL    Hemoglobin 14.6 14.0 - 18.0 g/dL    Hematocrit 43.6 42.0 - 52.0 %    MCV 87.7 81.4 - 97.8 fL    MCH 29.4 27.0 - 33.0 pg    MCHC 33.5 32.3 - 36.5 g/dL    RDW 39.7 35.9 - 50.0 fL    Platelet Count 178 164 - 446 K/uL    MPV 9.0 9.0 - 12.9 fL    Neutrophils-Polys 93.10 (H) 44.00 - 72.00 %    Lymphocytes 3.10 (L) 22.00 - 41.00 %    Monocytes 3.30 0.00 - 13.40 %    Eosinophils 0.00 0.00 - 6.90 %    Basophils 0.20 0.00 - 1.80 %    Immature Granulocytes 0.30 0.00 - 0.90 %    Nucleated RBC 0.00 0.00 - 0.20 /100 WBC    Neutrophils (Absolute) 8.23 (H) 1.82 - 7.42 K/uL    Lymphs (Absolute) 0.27 (L) 1.00 - 4.80 K/uL    Monos (Absolute) 0.29 0.00 - 0.85 K/uL    Eos (Absolute) 0.00 0.00 - 0.51 K/uL    Baso (Absolute) 0.02 0.00 - 0.12 K/uL    Immature Granulocytes (abs) 0.03 0.00 - 0.11 K/uL    NRBC (Absolute) 0.00 K/uL   CMP    Collection Time: 09/12/24  6:03 PM   Result Value Ref Range    Sodium 136 135 - 145 mmol/L    Potassium 3.8 3.6 - 5.5 mmol/L    Chloride 101 96 - 112 mmol/L    Co2 18 (L) 20 - 33 mmol/L    Anion Gap 17.0 (H) 7.0 - 16.0    Glucose 192 (H) 65 - 99 mg/dL    Bun 12 8 - 22 mg/dL    Creatinine 0.92 0.50 - 1.40 mg/dL    Calcium 8.7 8.5 - 10.5 mg/dL    Correct Calcium 8.5 8.5 - 10.5 mg/dL    AST(SGOT) 21 12 - 45 U/L    ALT(SGPT) 16 2 - 50 U/L    Alkaline Phosphatase 39 30 - 99 U/L    Total Bilirubin 1.2 0.1 - 1.5 mg/dL    Albumin 4.2 3.2 - 4.9 g/dL    Total Protein 6.7 6.0 - 8.2 g/dL    Globulin 2.5 1.9 - 3.5 g/dL    A-G Ratio 1.7 g/dL   PT/INR    Collection Time: 09/12/24  6:03 PM   Result Value Ref Range    PT 13.3 12.0 - 14.6 sec    INR 1.00 0.87 - 1.13   TROPONIN    Collection Time: 09/12/24  6:03 PM   Result Value Ref Range    Troponin T <6 6 - 19 ng/L   ESTIMATED GFR    Collection Time: 09/12/24  6:03 PM   Result Value Ref Range    GFR (CKD-EPI) 111 >60 mL/min/1.73 m 2   Triglyceride    Collection Time: 09/12/24  6:03 PM   Result Value Ref Range    Triglycerides 47 0 - 149 mg/dL   EKG    Collection  Time: 24  6:36 PM   Result Value Ref Range    Report       [facility] Emergency Dept.    Test Date:  2024  Pt Name:    JENNIFER COLEY                 Department: ER  MRN:        [id number]                      Room:       RD 01  Gender:     Male                         Technician: [id number]  :        [id number]                   Requested By:JAYY BHATT  Order #:    [id number]                    Reading MD:    Measurements  Intervals                                Axis  Rate:       110                          P:          64  NM:         196                          QRS:        50  QRSD:       104                          T:          50  QT:         331  QTc:        448    Interpretive Statements  Sinus tachycardia  Consider right atrial enlargement  RSR' in V1 or V2, probably normal variant  Minimal ST depression, anterolateral leads  Baseline wander in lead(s) I,III,aVL,V2  No previous ECG available for comparison     URINALYSIS    Collection Time: 24  6:43 PM    Specimen: Urine   Result Value Ref Range    Color Orange (A)     Character Clear     Specific Gravity >=1.045 (A) <1.035    Ph 5.0 5.0 - 8.0    Glucose 500 (A) Negative mg/dL    Ketones 80 (A) Negative mg/dL    Protein Negative Negative mg/dL    Bilirubin Negative Negative    Urobilinogen, Urine 0.2 Negative    Nitrite Negative Negative    Leukocyte Esterase Negative Negative    Occult Blood Moderate (A) Negative    Micro Urine Req Microscopic    URINE DRUG SCREEN    Collection Time: 24  6:43 PM   Result Value Ref Range    Amphetamines Urine Negative Negative    Barbiturates Negative Negative    Benzodiazepines Negative Negative    Cocaine Metabolite Negative Negative    Fentanyl, Urine Negative Negative    Methadone Negative Negative    Opiates Positive (A) Negative    Oxycodone Negative Negative    Phencyclidine -Pcp Negative Negative    Propoxyphene Negative Negative    Cannabinoid Metab Negative Negative    URINE MICROSCOPIC (W/UA)    Collection Time: 09/12/24  6:43 PM   Result Value Ref Range    WBC 0-2 (A) /hpf    RBC 10-20 (A) /hpf    Bacteria Negative None /hpf    Epithelial Cells Negative /hpf    Hyaline Cast 0-2 /lpf   CSF Cell Count    Collection Time: 09/12/24  7:46 PM   Result Value Ref Range    Number Of Tubes 4     Volume 6.3 mL    Color-Body Fluid White     Character-Body Fluid Cloudy     Supernatant Appearance Xanthochromic     Total RBC Count 1000 cells/uL    Crenated RBC 0 %    CSF Total Nucleated Cells 43157 (H) 0 - 10 cells/uL    Polys 99 %    CSF Mono/Macrophages 1 %    CSF Tube Number Tube 3    CSF CULTURE    Collection Time: 09/12/24  7:46 PM    Specimen: Tap; CSF   Result Value Ref Range    Significant Indicator NEG     Source CSF     Site TAP     Culture Result -     Gram Stain Result Many WBCs.  Moderate Gram positive cocci.   (A)    CSF PROTEIN    Collection Time: 09/12/24  7:46 PM   Result Value Ref Range    Total Protein, CSF >600 (H) 15 - 45 mg/dL   CSF GLUCOSE    Collection Time: 09/12/24  7:46 PM   Result Value Ref Range    Glucose CSF 3 (L) 40 - 80 mg/dL   MENINGITIS/ENCEPHALITIS CSF PANEL BY PCR    Collection Time: 09/12/24  7:46 PM   Result Value Ref Range    Cryptococcus neoformans/gattii by PCR Not Detected     Cytomegalovirus by PCR Not Detected     Enterovirus by PCR Not Detected     Escherichia coli K1 by PCR Not Detected     HAEM influenzae by PCR Not Detected     HSV 1 by PCR Not Detected     HSV 2 by PCR Not Detected     Human Herpesvirus 6 by PCR Not Detected     Human parechovirus by PCR Not Detected     Listeria Monocytogenes by PCR Not Detected     Neisseria meningitidis by PCR Not Detected     Strep Agalactiae by PCR Not Detected     Strep pneumoniae by PCR DETECTED (AA)     Varicella Zoster Virus by PCR Not Detected    GRAM STAIN    Collection Time: 09/12/24  7:46 PM    Specimen: CSF   Result Value Ref Range    Significant Indicator . (POS)     Source CSF     Site TAP      Gram Stain Result Many WBCs.  Moderate Gram positive cocci.   (A)    POCT arterial blood gas device results    Collection Time: 09/12/24 10:03 PM   Result Value Ref Range    Ph 7.294 (LL) 7.400 - 7.500    Pco2 39.7 (H) 26.0 - 37.0 mmHg    Po2 89 (H) 64 - 87 mmHg    Tco2 20 20 - 33 mmol/L    S02 96 93 - 99 %    Hco3 19.3 17.0 - 25.0 mmol/L    BE -7 (L) -4 - 3 mmol/L    Body Temp 37.5 C degrees    O2 Therapy 30 %    iPF Ratio 297     Ph Temp Abi 7.287 (LL) 7.400 - 7.500    Pco2 Temp Co 40.6 (H) 26.0 - 37.0 mmHg    Po2 Temp Cor 92 (H) 64 - 87 mmHg    Specimen Arterial     DelSys Vent     Tidal Volume 380 mL    Peep End Expiratory Pressure 8 cmh20    Set Rate 20     Mode APV-CMV    POCT lactate device results    Collection Time: 09/12/24 10:03 PM   Result Value Ref Range    iStat Lactate 1.0 0.5 - 2.0 mmol/L         Imaging  DX-CHEST-PORTABLE (1 VIEW)   Final Result         No acute cardiac or pulmonary abnormality is identified.      MR-BRAIN-WITH & W/O    (Results Pending)   MR-VENOGRAM (MRV) HEAD    (Results Pending)         Assessment/Plan  * Streptococcus pneumoniae meningitis- (present on admission)  Assessment & Plan  LP with strep pneumo on PCR, pending full culture data and sensitivity  - xanthochromia likely from bacterial meningitis, unlikely SAH  - WBC ~15,600  - Glucose 3  - Protein >600    Ceftriaxone 2g BID  Vancomycin for possible resistant strains  Continue dexamethasone which he got around the first dose of antibiotics    ID consult tomorrow morning  Consider cEEG if there is neurologic deterioration    Plan to keep on ventilator and sedated tonight, SAT/SBT per protocols    I have updated his wife    Sepsis due to Streptococcus pneumoniae with encephalopathy without septic shock (HCC)  Assessment & Plan  This is Sepsis Present on admission  SIRS criteria identified on my evaluation include: Tachycardia, with heart rate greater than 90 BPM  Clinical indicators of end organ dysfunction include Toxic  Metabolic Encephalopathy and GCS < 15  Source is Bacterial Meningitis  Sepsis protocol initiated  Crystalloid Fluid Administration: Resuscitation volume of 0 ordered. Reason that resuscitation volume of less than 30ml/kg was ordered  His hemodynamics improved with intubation and he does not require fluids at this time.  PRN 30cc/kg LR bolus ordered for MAP <65 or SBP <90  IV antibiotics as appropriate for source of sepsis  Reassessment: I have reassessed the patient's hemodynamic status    Continue Ceftriaxone 2g BID, Vancomycin, Dexamethasone  Pending strep pneumo sensitivity  Pending blood cultures    On mechanically assisted ventilation (HCC)  Assessment & Plan  Intubated date: 9/12  Reason intubated: AMS  GI prophylaxis: H2 blocker  Monitor ventilator waveforms & blood gases, titrate flow/peep and volumes according.   Daily SAT/SBT  All ventilator bundles are in place         DVT prophylaxis: Hold until MRI to ensure no hemorrhage  PUD prophylaxis: H2 blocker  Glycemic control: N/A  Nutrition: N.p.o. for now  Lines: None  Cordero: Placed after intubation    Discussed patient condition and risk of morbidity and/or mortality with Family, RN, RT, Pharmacy, Code status disscussed, Charge nurse / hot rounds, Patient, and neurology.      The patient remains critically ill.  He is intubated and on mechanical ventilation critical care time = 62 minutes in directly providing and coordinating critical care and extensive data review.  No time overlap and excludes procedures.

## 2024-09-13 NOTE — PROGRESS NOTES
Strep pneumo on PCR  - 15,600 WBC in CSF  - xanthochromia present but this is frequently seen with bacterial meningitis.  Likelihood of SAH is very low.  - glucose 3  - protein >600    I am DC acyclovir    Continue vancomycin and ceftriaxone  Continue dexamethasone    ID consult in the morning    MRI without venous thrombosis on wet read, full report tomorrow.  Hold heparin for now.  Ok for DVT prophylaxis which I will start.    I have called his wife and explained everything.      Mauri Bowser M.D.      No pertinent family history in first degree relatives

## 2024-09-13 NOTE — THERAPY
Physical Therapy   Initial Evaluation     Patient Name: Kwesi Rivera  Age:  34 y.o., Sex:  male  Medical Record #: [id number]  Today's Date: 9/13/2024     Precautions  Precautions: Fall Risk;Swallow Precautions;Nasogastric Tube;Other (See Comments)  Comments: Seizure precautions    Assessment  Patient is 34 y.o. male presented on 9/12/2024 for ongoing HA, AMS.     Currently been managed for meningitis-streptococcus pnemoniae, sepsis    Patient seen for PT evaluation. Limited participation with functional mobility as detailed below. Currently appears to be below baseline level of functional mobility. Will continue to benefit from PT services to help improve overall functional mobility. Recommend post-acute placement at this time.     Plan    Physical Therapy Initial Treatment Plan   Treatment Plan : Bed Mobility, Equipment, Family / Caregiver Training, Gait Training, Neuro Re-Education / Balance, Stair Training, Therapeutic Activities, Therapeutic Exercise  Treatment Frequency: 5 Times per Week  Duration: Until Therapy Goals Met    DC Equipment Recommendations: Unable to determine at this time  Discharge Recommendations: Recommend post-acute placement for additional physical therapy services prior to discharge home    Objective     09/13/24 1335   Time In/Time Out   Therapy Start Time 1319   Therapy End Time 1335   Total Therapy Time 16   Initial Contact Note    Initial Contact Note Order Received and Verified, Physical Therapy Evaluation in Progress with Full Report to Follow.   Precautions   Precautions Fall Risk;Swallow Precautions;Nasogastric Tube;Other (See Comments)   Comments Seizure precautions   Vitals   O2 Delivery Device ETT;Ventilator   Pain   Pain Scales Non Verbal Scale   Intervention Repositioned;Rest   Prior Living Situation   Prior Services None   Comments Unable to obtain the above information at this time. Will update in subsequent session/s as able.   Prior Level of Functional Mobility   Bed Mobility  Independent   Transfer Status Independent   Ambulation Independent   Ambulation Distance Community   Assistive Devices Used None   Stairs Independent   Comments Patient is a , he flies [employer] for the .   Cognition    Cognition / Consciousness X   Speech/ Communication Intubated / Trached   Level of Consciousness Responds to voice   Ability To Follow Commands Unable to Follow 1 Step Commands   Safety Awareness Impulsive   New Learning Impaired   Attention Impaired   Sequencing Impaired   Initiation Impaired   Comments Once patient was off sedation, he became alert, restless and impulsive. Had to be placed back on sedation.   Passive ROM Lower Body   Passive ROM Lower Body WDL   Active ROM Lower Body    Active ROM Lower Body  X   Comments Patient spontaneously moving BLE in bed   Strength Lower Body   Lower Body Strength  X   Comments Unable to accurately assess due to cognition, impulsivity   Sensation Lower Body   Lower Extremity Sensation   X   Comments Unable to assess   Lower Body Muscle Tone   Lower Body Muscle Tone  X   Comments RLE-intermittently going into flexion position   Coordination Lower Body    Coordination Lower Body  X   Comments Unable to assess   Vision   Vision Comments Noticed vertical nystagmus-upbeating   Balance Assessment   Comments Attempted EOB, however unable to come to upright sitting since patient became impulsive, once sedation wore off.   Bed Mobility    Rolling Contact Guard Assist   Comments Able to roll self from L sidelying-supine, supine-R sidelying; Able to bring his LE OOB, however upon attempt to bring his trunk upright, he became impulsive, restless and required to be returned back to supine for safety. RN was alerted, his sedation was turned back on.   Gait Analysis   Gait Level Of Assist Unable to Participate   Functional Mobility   Sit to Stand Unable to Participate   Bed, Chair, Wheelchair Transfer Unable to Participate   6 Clicks Assessment - How much  HELP from from another person do you currently need... (If the patient hasn't done an activity recently, how much help from another person do you think he/she would need if he/she tried?)   Turning from your back to your side while in a flat bed without using bedrails? 3   Moving from lying on your back to sitting on the side of a flat bed without using bedrails? 2   Moving to and from a bed to a chair (including a wheelchair)? 2   Standing up from a chair using your arms (e.g., wheelchair, or bedside chair)? 2   Walking in hospital room? 2   Climbing 3-5 steps with a railing? 1   6 clicks Mobility Score 12   Patient / Family Goals    Patient / Family Goal #1 None stated   Short Term Goals    Short Term Goal # 1 Patient will perform supine-sit, sit-supine with HOB flat without rails with supervision in 6 visits to safely get in & out of bed   Short Term Goal # 2 Patient will perform sit-stand with LRAD with supervision in 6 visits to progress with functional mobility   Short Term Goal # 3 Patient will perform chair transfers with LRAD with supervision in 6 visits to safely get OOB to chair   Short Term Goal # 4 Patient will ambulate 100 feet with LRAD with supervision in 6 visits to safely ambulate household distance   Education Group   Education Provided Role of Physical Therapist   Role of Physical Therapist Patient Response Patient;Explanation   Physical Therapy Initial Treatment Plan    Treatment Plan  Bed Mobility;Equipment;Family / Caregiver Training;Gait Training;Neuro Re-Education / Balance;Stair Training;Therapeutic Activities;Therapeutic Exercise   Treatment Frequency 5 Times per Week   Duration Until Therapy Goals Met   Problem List    Problems Impaired Bed Mobility;Impaired Transfers;Impaired Ambulation;Functional ROM Deficit;Functional Strength Deficit;Impaired Balance;Impaired Coordination;Impaired Vision;Decreased Activity Tolerance;Safety Awareness Deficits / Cognition;Motor Planning / Sequencing    Anticipated Discharge Equipment and Recommendations   DC Equipment Recommendations Unable to determine at this time   Discharge Recommendations Recommend post-acute placement for additional physical therapy services prior to discharge home   Interdisciplinary Plan of Care Collaboration   IDT Collaboration with  Nursing;Occupational Therapist   Patient Position at End of Therapy In Bed;Bed Alarm On;Wrist Restraints Applied   Session Information   Date / Session Number  9/13-1(1/5, 9/19)     Patient seen for team evaluation with Occupational Therapist for the following reason(s):  Patient required 2 person assistance for safety and to provide effective interventions. Each discipline assisted patient with appropriate and separate goals. Due to the medical complexity, the skill of both practitioners is needed to monitor vitals, patient status, and adjust the intervention to fit the patient's needs and goals. Therapy sessions needed to be done by a certain time period for both disciplines, and did not impede patient's progress.

## 2024-09-13 NOTE — CONSULTS
INFECTIOUS DISEASES INPATIENT CONSULT NOTE     Date of Service: 9/13/2024    Consult Requested By: JUDY Real    Reason for Consultation: Streptococcus pneumoniae meningitis    History of Present Illness:   Kwesi Rivera is a 34 y.o. man  with no prior medical history admitted 9/12/2024 when outside hospital secondary to altered mentation.  Extensive review of emergency physician notes, hospital medicine notes and consultant notes performed.  Patient is currently intubated.  History obtained from wife at bedside.  Patient had been doing well until earlier on the day of admission when he developed a severe headache.  His symptoms worsened throughout the day and he had a few episodes of emesis.  There was no report of fevers or chills.  No prior upper respiratory infections.  No ill contacts.  Patient had been complaining of a persistent headache for approximately 1 day prior to admission.  Wife states that he was also complaining of some back pain.  He went to the chiropractor several days prior with some improvement.  He initially presented to [facility] emergency department and was found to be nonverbal with a GCS of roughly 9.  There were no focal deficits however he was unable to follow commands.  Patient transferred to [facility] for higher level of care.  Patient underwent a lumbar puncture on 9/12 with WBC greater than 15,000, 99% polys, 3 glucose and greater than 600 protein consistent with bacterial meningitis.  Meningitis/encephalitis panel PCR was positive for Streptococcus pneumoniae.  CSF cultures also positive for Streptococcus pneumoniae.  Blood cultures on admission are negative to date.  Patient is currently on IV ceftriaxone and vancomycin.  Infectious disease service consulted for recommendations.      Unable to obtain a full review of systems as patient intubated    PMH:   Unable to obtain    PSH:  Unable to obtain    FAMILY HX:  Unable to obtain    SOCIAL  HX:  Social History     Socioeconomic History    Marital status: Single     Spouse name: Not on file    Number of children: Not on file    Years of education: Not on file    Highest education level: Bachelor's degree (e.g., BA, AB, BS)   Occupational History    Not on file   Tobacco Use    Smoking status: Never    Smokeless tobacco: Never   Substance and Sexual Activity    Alcohol use: Not on file    Drug use: Not on file    Sexual activity: Not on file   Other Topics Concern    Not on file   Social History Narrative    Not on file     Social Determinants of Health     Financial Resource Strain: Low Risk  (7/31/2022)    Overall Financial Resource Strain (CARDIA)     Difficulty of Paying Living Expenses: Not hard at all   Food Insecurity: No Food Insecurity (7/31/2022)    Hunger Vital Sign     Worried About Running Out of Food in the Last Year: Never true     Ran Out of Food in the Last Year: Never true   Transportation Needs: No Transportation Needs (7/31/2022)    PRAPARE - Transportation     Lack of Transportation (Medical): No     Lack of Transportation (Non-Medical): No   Physical Activity: Sufficiently Active (7/31/2022)    Exercise Vital Sign     Days of Exercise per Week: 5 days     Minutes of Exercise per Session: 60 min   Stress: No Stress Concern Present (7/31/2022)    [employer] [place] [employer] - Occupational Stress Questionnaire     Feeling of Stress : Not at all   Social Connections: Moderately Integrated (7/31/2022)    Social Connection and Isolation Panel [NHANES]     Frequency of Communication with Friends and Family: More than three times a week     Frequency of Social Gatherings with Friends and Family: Once a week     Attends Jainism Services: More than 4 times per year     Active Member of Clubs or Organizations: No     Attends Club or Organization Meetings: Never     Marital Status: Living with partner   Intimate Partner Violence: Not on file   Housing Stability: Low Risk   (7/31/2022)    Housing Stability Vital Sign     Unable to Pay for Housing in the Last Year: No     Number of Places Lived in the Last Year: 1     Unstable Housing in the Last Year: No     Social History     Tobacco Use   Smoking Status Never   Smokeless Tobacco Never     Social History     Substance and Sexual Activity   Alcohol Use None       Allergies/Intolerances:  No Known Allergies      Other Current Medications:    Current Facility-Administered Medications:     MD Alert...Vancomycin per Pharmacy, , Other, PHARMACY TO DOSE, Mauri Bowser M.D.    cefTRIAXone (Rocephin) syringe 2,000 mg, 2,000 mg, Intravenous, BID, Mauri Bowser M.D.    fentaNYL (Sublimaze) injection 50 mcg, 50 mcg, Intravenous, Q15 MIN PRN, 50 mcg at 09/13/24 0532 **AND** fentaNYL (Sublimaze) injection 100 mcg, 100 mcg, Intravenous, Q15 MIN PRN **AND** fentaNYL (SUBLIMAZE) 50 mcg/mL in 50mL (Continuous Infusion), , Intravenous, Continuous, Last Rate: 2 mL/hr at 09/13/24 0732, 100 mcg/hr at 09/13/24 0732 **AND** propofol (DIPRIVAN) injection, 0-80 mcg/kg/min (Ideal), Intravenous, Continuous, Last Rate: 11.9 mL/hr at 09/13/24 0334, 30 mcg/kg/min at 09/13/24 0334 **AND** [START ON 9/14/2024] Triglyceride, , , Every 3 Days (0300), Mauri Bowser M.D.    Respiratory Therapy Consult, , Nebulization, Continuous RT, Mauri Bowser M.D.    acetaminophen (Tylenol) tablet 650 mg, 650 mg, Enteral Tube, Q6HRS PRN, Mauri Bowser M.D., 650 mg at 09/13/24 0458    senna-docusate (Pericolace Or Senokot S) 8.6-50 MG per tablet 2 Tablet, 2 Tablet, Enteral Tube, BID **AND** polyethylene glycol/lytes (Miralax) Packet 1 Packet, 1 Packet, Enteral Tube, QDAY PRN, Mauri Bowser M.D.    hydrALAZINE (Apresoline) injection 10 mg, 10 mg, Intravenous, Q4HRS PRN, Mauri Bowser M.D.    labetalol (Normodyne/Trandate) injection 10 mg, 10 mg, Intravenous, Q4HRS PRN, Marui Bowser M.D.    famotidine (Pepcid) tablet 20 mg, 20 mg, Enteral Tube, Q12HRS **OR** famotidine (Pepcid)  "injection 20 mg, 20 mg, Intravenous, Q12HRS, Mauri Bowser M.D.    MD Alert...ICU Electrolyte Replacement per Pharmacy, , Other, PHARMACY TO DOSE, Mauri Bowser M.D.    lidocaine (Xylocaine) 1 % injection 2 mL, 2 mL, Tracheal Tube, Q30 MIN PRN, Mauri Bowser M.D.    dexamethasone (Decadron) injection 10 mg, 10 mg, Intravenous, Q6HRS, Mauri Bowser M.D., 10 mg at 24 0242    vancomycin (Vancocin) 1,250 mg in  mL IVPB, 1,250 mg, Intravenous, Q8HR, Mauri Bowser M.D., Last Rate: 125 mL/hr at 24 0510, 1,250 mg at 24 0510    insulin regular (HumuLIN R,NovoLIN R) injection, 2-9 Units, Subcutaneous, Q6HRS, 2 Units at 24 0105 **AND** POC blood glucose manual result, , , Q6H **AND** NOTIFY MD and PharmD, , , Once **AND** Administer 20 grams of glucose (approximately 8 ounces of fruit juice) every 15 minutes PRN FSBG less than 70 mg/dL, , , PRN **AND** dextrose 50% (D50W) injection 25 g, 25 g, Intravenous, Q15 MIN PRN, Yeni Garcia    lactated ringers infusion, , Intravenous, Continuous, Yeni Garcia, Last Rate: 100 mL/hr at 24 2311, New Bag at 24 2311    ondansetron (Zofran) syringe/vial injection 4 mg, 4 mg, Intravenous, Q6HRS PRN, Yeni Garcia    enoxaparin (Lovenox) inj 40 mg, 40 mg, Subcutaneous, DAILY AT 1800, Mauri Bowser M.D., 40 mg at 24 0107    LR (Bolus) infusion 2,382 mL, 30 mL/kg, Intravenous, Once PRN, Mauri Bowser M.D.  [unfilled]    Most Recent Vital Signs:  /69   Pulse 64   Temp 36.2 °C (97.1 °F) (Temporal)   Resp 20   Ht 1.753 m (5' 9\")   Wt 81.6 kg (179 lb 14.3 oz)   SpO2 95%   BMI 26.57 kg/m²   Temp  Av.9 °C (98.4 °F)  Min: 36.2 °C (97.1 °F)  Max: 37.6 °C (99.7 °F)    Physical Exam:  General: well nourished, no diaphoresis, well-appearing, no acute distress  HEENT: Eyes closed, ET tube in place   neck: supple, no lymphadenopathy  Chest: CTAB, no rales, rhonchi or wheezes, normal work of breathing.  Cardiac: regular " rate and rhythm, normal S1 S2, no murmurs, rubs or gallops  Abdomen: + bowel sounds, soft, non-tender, non-distended, no hepatosplenomegaly  Extremities: WWP, no edema, 2+ pedal pulses  Skin: warm and dry, no rashes or worrisome lesions  Neuro: Sedated.  Not following commands per bedside RN   psych: Unable to assess    Pertinent Lab Results:  Recent Labs     09/12/24 1803   WBC 8.8      Recent Labs     09/12/24 1803   HEMOGLOBIN 14.6   HEMATOCRIT 43.6   MCV 87.7   MCH 29.4   PLATELETCT 178         Recent Labs     09/12/24  1803   SODIUM 136   POTASSIUM 3.8   CHLORIDE 101   CO2 18*   CREATININE 0.92        Recent Labs     09/12/24 1803   ALBUMIN 4.2        Pertinent Micro:  Results       Procedure Component Value Units Date/Time    CSF CULTURE [[id number]]  (Abnormal) Collected: 09/12/24 1946    Order Status: Completed Specimen: CSF from Tap Updated: 09/13/24 0713     Significant Indicator POS     Source CSF     Site TAP     Culture Result -     Gram Stain Result Many WBCs.  Moderate Gram positive cocci.       Culture Result Streptococcus pneumoniae  Moderate growth      Narrative:      ICC tel. [phone] 09/13/2024, 07:13, RB PERF. RESULTS CALLED TO:[id number]  ER tel.  09/12/2024, 20:50, RB PERF. RESULTS CALLED TO: Hallie GILLIAM [phone]    BLOOD CULTURE [[id number]] Collected: 09/12/24 2220    Order Status: Completed Specimen: Blood from Peripheral Updated: 09/13/24 0008     Significant Indicator NEG     Source BLD     Site PERIPHERAL     Culture Result No Growth  Note: Blood cultures are incubated for 5 days and  are monitored continuously.Positive blood cultures  are called to the RN and reported as soon as  they are identified.      BLOOD CULTURE [[id number]] Collected: 09/12/24 2205    Order Status: Completed Specimen: Blood from Peripheral Updated: 09/13/24 0008     Significant Indicator NEG     Source BLD     Site PERIPHERAL     Culture Result No Growth  Note: Blood cultures are incubated for 5 days and  are monitored  "continuously.Positive blood cultures  are called to the RN and reported as soon as  they are identified.      MRSA By PCR (Amp) [[id number]] Collected: 09/12/24 2205    Order Status: Sent Specimen: Respirate from Nares Updated: 09/12/24 2219    GRAM STAIN [[id number]]  (Abnormal) Collected: 09/12/24 1946    Order Status: Completed Specimen: CSF Updated: 09/12/24 2051     Significant Indicator .     Source CSF     Site TAP     Gram Stain Result Many WBCs.  Moderate Gram positive cocci.      Narrative:      ER tel.  09/12/2024, 20:50, RB PERF. RESULTS CALLED TO: Hallie GILLIAM [phone]    URINALYSIS [[id number]]  (Abnormal) Collected: 09/12/24 1843    Order Status: Completed Specimen: Urine Updated: 09/12/24 1918     Color Orange     Character Clear     Specific Gravity >=1.045     Ph 5.0     Glucose 500 mg/dL      Ketones 80 mg/dL      Protein Negative mg/dL      Bilirubin Negative     Urobilinogen, Urine 0.2     Nitrite Negative     Leukocyte Esterase Negative     Occult Blood Moderate     Micro Urine Req Microscopic    URINALYSIS [[id number]]     Order Status: Canceled Specimen: Urine           No results found for: \"BLOODCULTU\", \"BLDCULT\", \"BCHOLD\"     Studies:  DX-CHEST-PORTABLE (1 VIEW)    Result Date: 9/12/2024 9/12/2024 6:36 PM HISTORY/REASON FOR EXAM:  Shortness of Breath TECHNIQUE/EXAM DESCRIPTION AND NUMBER OF VIEWS: Single portable view of the chest. COMPARISON: None FINDINGS: Tracheal tube tip is below the level of the clavicles. NG tube is in the proximal stomach. Heart size is within normal limits. No focal infiltrates or consolidations are identified in the lungs. No pleural fluid collections are identified. No pneumothorax is appreciated.     No acute cardiac or pulmonary abnormality is identified.      IMPRESSION:   1.  Streptococcus pneumoniae meningitis   2.  Ventilator dependent respiratory failure  3.  Acute encephalopathy, secondary to above  4.  Back pain, prior to admission    PLAN:   Kwesi Rivera is a " 34 y.o. man with no prior medical history admitted from [facility] secondary to acute encephalopathy and headache.  Lumbar puncture on 9/12 consistent with bacterial meningitis with both CSF cultures and meningitis/encephalitis PCR panel positive for Streptococcus pneumoniae.  Blood cultures on admission are negative to date    -Continue IV vancomycin and IV ceftriaxone 2 g every 12 hours pending strep pneumoniae susceptibilities  -Monitor renal function and Vanco trough  -CSF cultures+ Streptococcus pneumonia.  Follow susceptibilities.  Also requested susceptibility to levofloxacin  -Continue dexamethasone for 4 days  -Continue to monitor neurologic status  -According to the patient's wife, patient was complaining of back pain prior to admission.  When able, will need to assess for any ongoing back pain that may require imaging    This infection poses a threat to life and further neurologic function    Disposition: TBD    Need for PICC line: TBD    Plan of care discussed with intensivist, Dr. Dowell, ICU APRN, Tamar Jc and wife at bedside. Will continue to follow    Briana Chen M.D.      Please note that this dictation was created using voice recognition software. I have worked with technical experts from [employer] to optimize the interface.  I have made every reasonable attempt to correct obvious errors, but there may be errors of grammar and possibly content that I did not discover before finalizing the note.

## 2024-09-13 NOTE — CONSULTS
Neurology Initial Consultation Note  Neurohospitalist Service, [facility] Neurosciences    Referring Physician: Roque Albarado D.O.    Reason for Consultation   Chief Complaint   Patient presents with    ALOC         HPI: Kwesi Rivera is a 34 y.o. male with no significant past medical history who was transferred from [facility] after he presented with 1 day history of severe headache associated with nausea and vomiting with subsequent alteration of mental status and agitation.  Patient is not able to provide any history and is nonverbal at this time.  He seems agitated and maintain fetal position while rubbing his head.  According to his wife he was complaining of headache for 1 day and apparently he had an episode of vomiting earlier today and took a nap at around noon, when he woke up at around 2 PM he was altered with reported GCS of 10.  He underwent a brain CT which did not reveal acute abnormalities although in my review of CT there is some questionable diffuse edema in both hemisphere.  CTA of the head is suboptimal but grossly does not show any large vessel occlusion or flow-limiting stenosis.    Review of systems: In addition to what is detailed in the HPI above, all other systems reviewed and are negative.    Past Medical History:    has no past medical history on file.    FHx:  family history is not on file.    SHx:   reports that he has never smoked. He has never used smokeless tobacco.    Allergies:  No Known Allergies    Medications:    Current Facility-Administered Medications:     propofol (DIPRIVAN) injection, 0-80 mcg/kg/min (Ideal), Intravenous, Continuous, Last Rate: 19.8 mL/hr at 09/12/24 1847, 50 mcg/kg/min at 09/12/24 1847 **AND** Triglycerides Starting now and then Every 3 Days, , , Every 3 Days (0300), Roque Albarado D.O.    LORazepam (Ativan) injection 2 mg, 2 mg, Intravenous, Once, Roque Albarado D.O.    Current Outpatient Medications:      "Melatonin 5 MG Chew Tab, Chew 10 mg at bedtime as needed (Sleep). 2 tablets = 10 mg., Disp: , Rfl:     ibuprofen (ADVIL) 200 MG Tab, Take 400 mg by mouth every 6 hours as needed for Headache. 2 tablets = 400 mg., Disp: , Rfl:     acetaminophen (TYLENOL) 500 MG Tab, Take 1,000 mg by mouth every 6 hours as needed (Headache). 2 tablets = 1,000 mg., Disp: , Rfl:     Non Formulary Request, Take 1 Capsule by mouth every day. \"Alpilean Weight Loss Support\" Contains vitamin B-12, chromium, turmeric rhizome, African mariana seed, ginger rhizome, moringa leaf, citrus bioflavonoids, fucoxanthin  Indications: Dietary Supplement, Disp: , Rfl:     Physical Examination:    Vitals:    09/12/24 1831 09/12/24 1839 09/12/24 1848 09/12/24 1851   BP: (!) 187/104 (!) 203/104 (!) 185/97 (!) 178/90   Pulse: (!) 117 (!) 106 (!) 103 (!) 107   Resp: 19 20 (!) 29 (!) 28   Temp:       TempSrc:       SpO2: 99% 99% 98% 98%   Weight:       Height:           General:   Patient is awake but restless and nonverbal.  He is not able to follow commands.  He is in fetal position and sometimes lays on his stomach.  Neck: Full range of motion  Eyes: Midline, Pupils reactive to light.  CV: RRR  Lungs: No respiratory distress  Extremities: No cyanosis, warm, no significant edema.    NEUROLOGICAL EXAM:   Mental status: Awake, somewhat agitated, restless and nonverbal.  He is not able to follow commands.  Speech and language: He is nonverbal cranial nerve exam: Pupils are equal, round and reactive to light bilaterally. Visual fields cannot be tested.  Face is symmetric. Sensation in the face is difficult to assess.  Motor exam: He moves all 4 extremities strongly but not to command.  Sensory exam: Unable to assess  Coordination: Unable to assess  Plantar reflexes: Equivocal  Gait: deferred       Objective Data:    Labs:  Lab Results   Component Value Date/Time    PROTHROMBTM 13.3 09/12/2024 06:03 PM    INR 1.00 09/12/2024 06:03 PM      Lab Results   Component " "Value Date/Time    WBC 8.8 09/12/2024 06:03 PM    RBC 4.97 09/12/2024 06:03 PM    HEMOGLOBIN 14.6 09/12/2024 06:03 PM    HEMATOCRIT 43.6 09/12/2024 06:03 PM    MCV 87.7 09/12/2024 06:03 PM    MCH 29.4 09/12/2024 06:03 PM    MCHC 33.5 09/12/2024 06:03 PM    MPV 9.0 09/12/2024 06:03 PM    NEUTSPOLYS 93.10 (H) 09/12/2024 06:03 PM    LYMPHOCYTES 3.10 (L) 09/12/2024 06:03 PM    MONOCYTES 3.30 09/12/2024 06:03 PM    EOSINOPHILS 0.00 09/12/2024 06:03 PM    BASOPHILS 0.20 09/12/2024 06:03 PM      Lab Results   Component Value Date/Time    SODIUM 136 09/12/2024 06:03 PM    POTASSIUM 3.8 09/12/2024 06:03 PM    CHLORIDE 101 09/12/2024 06:03 PM    CO2 18 (L) 09/12/2024 06:03 PM    GLUCOSE 192 (H) 09/12/2024 06:03 PM    BUN 12 09/12/2024 06:03 PM    CREATININE 0.92 09/12/2024 06:03 PM      No results found for: \"CHOLSTRLTOT\", \"LDL\", \"HDL\", \"TRIGLYCERIDE\"    Lab Results   Component Value Date/Time    ALKPHOSPHAT 39 09/12/2024 06:03 PM    ASTSGOT 21 09/12/2024 06:03 PM    ALTSGPT 16 09/12/2024 06:03 PM    TBILIRUBIN 1.2 09/12/2024 06:03 PM        Imaging/Testing:    I interpreted and/or reviewed the patient's neuroimaging    MR-BRAIN-WITH & W/O    (Results Pending)   MR-VENOGRAM (MRV) HEAD    (Results Pending)   DX-CHEST-PORTABLE (1 VIEW)    (Results Pending)       Assessment/plan:  Kwesi Rivera is a 34 y.o. male with no significant past medical history who was transferred from [facility] after he presented with 1 day history of severe headache associated with nausea and vomiting with subsequent alteration of mental status and agitation.  Patient is not able to provide any history and is nonverbal at this time.  He seems agitated and maintain fetal position while rubbing his head.  According to his wife he was complaining of headache for 1 day and apparently he had an episode of vomiting earlier today and took a nap at around noon, when he woke up at around 2 PM he was altered with reported GCS of 10.  He underwent " a brain CT which did not reveal acute abnormalities although in my review of CT there is some questionable diffuse edema in both hemisphere.  CTA of the head is suboptimal but grossly does not show any large vessel occlusion or flow-limiting stenosis.  Urine drug screen reportedly negative.  I am concerned for sinus thrombosis for which  will obtain brain MRI with and without contrast and MRV of the head.  Subarachnoid hemorrhage is not totally excluded for which I would recommend spinal tap to assess for blood and also rule out meningoencephalitis.  Patient is restless and agitated and in order to obtain requested diagnostic test as well as airway protection we will proceed with temporary intubation.  He will be admitted to intensive care unit for close neuromonitoring.  Maintain normal blood pressure.      Upon my evaluation, this patient had a high probability of imminent or life-threatening deterioration due to encephalopathy and alteration of mental status which required my direct attention, intervention, and personal management.  I personally provided 55 minutes of total critical care time. Time includes: review of laboratory data, review of radiology studies, discussion with consultants, discussion with family/patient, monitoring for potential decompensation.  Interventions were performed as documented in the chart.     The plan of care above has been discussed with Roque Albarado D.O.      Please note that this dictation was created using voice recognition software. I have made every reasonable attempt to correct obvious errors, but I expect that there are errors of grammar and possibly content that I did not discover before finalizing the note.       Luz Medina MD  Acute Care Neurology Services

## 2024-09-13 NOTE — ASSESSMENT & PLAN NOTE
"-presented with AMS and headaches  -LP done in ED \"[facility]\" showed greater than 15,000 WBC and greater than 600 protein  -PCR positive Streptococcus pneumoniae  -ID following. Continue current ABX regimen for now. Follow sensitivities  -Serial neurologic exams  "

## 2024-09-13 NOTE — ED NOTES
Pt's wife updated on pt's condition and plan of care by this RN and ERP. MRI screening form completed with wife.

## 2024-09-13 NOTE — ED NOTES
INTUBATION NOTE    1824: ERP, RT, RN x2 and pharmacy at bedside. Time out performed.  1825: 20 mg etomidate   1827: 10 mg etomidate  1828: 100mg TAMANNA  1830: ETT by Dr. Albarado (+BS and color change)    OG and ho placed.

## 2024-09-13 NOTE — RESPIRATORY CARE
S.T.O.P for Intrahospital Transportation Safety for Ventilated Patients    S- Patricia then maneuver. Were airway secretions cleared? Yes    T- Check your tube. Is your airway patent and in correct position? Yes    O- Transition for oxygen cylinder to wall oxygen source on return. Was this accomplished? Yes    P- Transition from ventilator battery power to wall power source on return. Was this accomplished? Yes

## 2024-09-13 NOTE — ED NOTES
Med rec completed per patient's spouse Santa ([phone]).    Allergies reviewed with spouse. NKDA.    Patient's spouse states that patient does not take any prescription medications.    Outpatient antibiotics within the last 30 days: none.    ANTICOAGULANTS: none.

## 2024-09-14 PROBLEM — G93.40 ACUTE ENCEPHALOPATHY: Status: ACTIVE | Noted: 2024-09-14

## 2024-09-14 PROBLEM — M51.26 LUMBAR DISC HERNIATION: Status: ACTIVE | Noted: 2024-09-14

## 2024-09-14 LAB
ALBUMIN SERPL BCP-MCNC: 3.3 G/DL (ref 3.2–4.9)
ALBUMIN/GLOB SERPL: 1.2 G/DL
ALP SERPL-CCNC: 35 U/L (ref 30–99)
ALT SERPL-CCNC: 14 U/L (ref 2–50)
ANION GAP SERPL CALC-SCNC: 13 MMOL/L (ref 7–16)
AST SERPL-CCNC: 16 U/L (ref 12–45)
BACTERIA CSF CULT: ABNORMAL
BACTERIA CSF CULT: ABNORMAL
BASE EXCESS BLDA CALC-SCNC: -1 MMOL/L (ref -4–3)
BILIRUB SERPL-MCNC: 0.8 MG/DL (ref 0.1–1.5)
BODY TEMPERATURE: ABNORMAL DEGREES
BREATHS SETTING VENT: 18
BUN SERPL-MCNC: 12 MG/DL (ref 8–22)
CALCIUM ALBUM COR SERPL-MCNC: 9.2 MG/DL (ref 8.5–10.5)
CALCIUM SERPL-MCNC: 8.6 MG/DL (ref 8.5–10.5)
CHLORIDE SERPL-SCNC: 110 MMOL/L (ref 96–112)
CO2 BLDA-SCNC: 24 MMOL/L (ref 20–33)
CO2 SERPL-SCNC: 20 MMOL/L (ref 20–33)
CREAT SERPL-MCNC: 0.68 MG/DL (ref 0.5–1.4)
DELSYS IDSYS: ABNORMAL
END TIDAL CARBON DIOXIDE IECO2: 30 MMHG
ERYTHROCYTE [DISTWIDTH] IN BLOOD BY AUTOMATED COUNT: 43.8 FL (ref 35.9–50)
ETEST SENSITIVITY ETEST: NORMAL
GFR SERPLBLD CREATININE-BSD FMLA CKD-EPI: 124 ML/MIN/1.73 M 2
GLOBULIN SER CALC-MCNC: 2.7 G/DL (ref 1.9–3.5)
GLUCOSE BLD STRIP.AUTO-MCNC: 140 MG/DL (ref 65–99)
GLUCOSE BLD STRIP.AUTO-MCNC: 143 MG/DL (ref 65–99)
GLUCOSE BLD STRIP.AUTO-MCNC: 150 MG/DL (ref 65–99)
GLUCOSE BLD STRIP.AUTO-MCNC: 175 MG/DL (ref 65–99)
GLUCOSE SERPL-MCNC: 165 MG/DL (ref 65–99)
GRAM STN SPEC: ABNORMAL
HCO3 BLDA-SCNC: 22.5 MMOL/L (ref 17–25)
HCT VFR BLD AUTO: 39.4 % (ref 42–52)
HGB BLD-MCNC: 12.8 G/DL (ref 14–18)
HOROWITZ INDEX BLDA+IHG-RTO: 300 MM[HG]
LACTATE BLD-SCNC: 1.3 MMOL/L (ref 0.5–2)
MAGNESIUM SERPL-MCNC: 2.6 MG/DL (ref 1.5–2.5)
MCH RBC QN AUTO: 29.2 PG (ref 27–33)
MCHC RBC AUTO-ENTMCNC: 32.5 G/DL (ref 32.3–36.5)
MCV RBC AUTO: 90 FL (ref 81.4–97.8)
MODE IMODE: ABNORMAL
O2/TOTAL GAS SETTING VFR VENT: 30 %
PCO2 BLDA: 33 MMHG (ref 26–37)
PCO2 TEMP ADJ BLDA: 33 MMHG (ref 26–37)
PEEP END EXPIRATORY PRESSURE IPEEP: 8 CMH20
PH BLDA: 7.44 [PH] (ref 7.4–7.5)
PH TEMP ADJ BLDA: 7.44 [PH] (ref 7.4–7.5)
PHOSPHATE SERPL-MCNC: 3 MG/DL (ref 2.5–4.5)
PLATELET # BLD AUTO: 141 K/UL (ref 164–446)
PMV BLD AUTO: 10.2 FL (ref 9–12.9)
PO2 BLDA: 90 MMHG (ref 64–87)
PO2 TEMP ADJ BLDA: 90 MMHG (ref 64–87)
POTASSIUM SERPL-SCNC: 4.6 MMOL/L (ref 3.6–5.5)
PROT SERPL-MCNC: 6 G/DL (ref 6–8.2)
RBC # BLD AUTO: 4.38 M/UL (ref 4.7–6.1)
SAO2 % BLDA: 97 % (ref 93–99)
SIGNIFICANT IND 70042: ABNORMAL
SITE SITE: ABNORMAL
SODIUM SERPL-SCNC: 143 MMOL/L (ref 135–145)
SOURCE SOURCE: ABNORMAL
SPECIMEN DRAWN FROM PATIENT: ABNORMAL
TIDAL VOLUME IVT: 400 ML
TRIGL SERPL-MCNC: 179 MG/DL (ref 0–149)
WBC # BLD AUTO: 10.6 K/UL (ref 4.8–10.8)

## 2024-09-14 PROCEDURE — 82962 GLUCOSE BLOOD TEST: CPT | Mod: 91

## 2024-09-14 PROCEDURE — 83605 ASSAY OF LACTIC ACID: CPT

## 2024-09-14 PROCEDURE — 94003 VENT MGMT INPAT SUBQ DAY: CPT

## 2024-09-14 PROCEDURE — 85027 COMPLETE CBC AUTOMATED: CPT

## 2024-09-14 PROCEDURE — 700101 HCHG RX REV CODE 250: Performed by: STUDENT IN AN ORGANIZED HEALTH CARE EDUCATION/TRAINING PROGRAM

## 2024-09-14 PROCEDURE — 84100 ASSAY OF PHOSPHORUS: CPT

## 2024-09-14 PROCEDURE — 99291 CRITICAL CARE FIRST HOUR: CPT | Performed by: NURSE PRACTITIONER

## 2024-09-14 PROCEDURE — 700102 HCHG RX REV CODE 250 W/ 637 OVERRIDE(OP): Performed by: STUDENT IN AN ORGANIZED HEALTH CARE EDUCATION/TRAINING PROGRAM

## 2024-09-14 PROCEDURE — 83735 ASSAY OF MAGNESIUM: CPT

## 2024-09-14 PROCEDURE — 94150 VITAL CAPACITY TEST: CPT

## 2024-09-14 PROCEDURE — 84478 ASSAY OF TRIGLYCERIDES: CPT

## 2024-09-14 PROCEDURE — 92610 EVALUATE SWALLOWING FUNCTION: CPT

## 2024-09-14 PROCEDURE — A9270 NON-COVERED ITEM OR SERVICE: HCPCS | Performed by: STUDENT IN AN ORGANIZED HEALTH CARE EDUCATION/TRAINING PROGRAM

## 2024-09-14 PROCEDURE — 700111 HCHG RX REV CODE 636 W/ 250 OVERRIDE (IP): Mod: JZ | Performed by: STUDENT IN AN ORGANIZED HEALTH CARE EDUCATION/TRAINING PROGRAM

## 2024-09-14 PROCEDURE — 770022 HCHG ROOM/CARE - ICU (200)

## 2024-09-14 PROCEDURE — 82803 BLOOD GASES ANY COMBINATION: CPT

## 2024-09-14 PROCEDURE — 700105 HCHG RX REV CODE 258: Performed by: NURSE PRACTITIONER

## 2024-09-14 PROCEDURE — 36600 WITHDRAWAL OF ARTERIAL BLOOD: CPT

## 2024-09-14 PROCEDURE — 700105 HCHG RX REV CODE 258: Performed by: STUDENT IN AN ORGANIZED HEALTH CARE EDUCATION/TRAINING PROGRAM

## 2024-09-14 PROCEDURE — 80053 COMPREHEN METABOLIC PANEL: CPT

## 2024-09-14 PROCEDURE — 94799 UNLISTED PULMONARY SVC/PX: CPT

## 2024-09-14 RX ORDER — ACETAMINOPHEN 650 MG/1
650 SUPPOSITORY RECTAL EVERY 6 HOURS PRN
Status: DISCONTINUED | OUTPATIENT
Start: 2024-09-14 | End: 2024-09-19 | Stop reason: HOSPADM

## 2024-09-14 RX ORDER — ACETAMINOPHEN 650 MG/1
650 SUPPOSITORY RECTAL EVERY 4 HOURS PRN
Status: DISCONTINUED | OUTPATIENT
Start: 2024-09-14 | End: 2024-09-14

## 2024-09-14 RX ADMIN — DEXAMETHASONE SODIUM PHOSPHATE 10 MG: 4 INJECTION INTRA-ARTICULAR; INTRALESIONAL; INTRAMUSCULAR; INTRAVENOUS; SOFT TISSUE at 02:55

## 2024-09-14 RX ADMIN — DEXAMETHASONE SODIUM PHOSPHATE 10 MG: 4 INJECTION INTRA-ARTICULAR; INTRALESIONAL; INTRAMUSCULAR; INTRAVENOUS; SOFT TISSUE at 19:56

## 2024-09-14 RX ADMIN — VANCOMYCIN HYDROCHLORIDE 1250 MG: 5 INJECTION, POWDER, LYOPHILIZED, FOR SOLUTION INTRAVENOUS at 05:46

## 2024-09-14 RX ADMIN — VANCOMYCIN HYDROCHLORIDE 1250 MG: 5 INJECTION, POWDER, LYOPHILIZED, FOR SOLUTION INTRAVENOUS at 14:09

## 2024-09-14 RX ADMIN — DEXAMETHASONE SODIUM PHOSPHATE 10 MG: 4 INJECTION INTRA-ARTICULAR; INTRALESIONAL; INTRAMUSCULAR; INTRAVENOUS; SOFT TISSUE at 08:20

## 2024-09-14 RX ADMIN — PROPOFOL 15 MCG/KG/MIN: 10 INJECTION, EMULSION INTRAVENOUS at 10:34

## 2024-09-14 RX ADMIN — INSULIN HUMAN 2 UNITS: 100 INJECTION, SOLUTION PARENTERAL at 12:16

## 2024-09-14 RX ADMIN — CEFTRIAXONE SODIUM 2000 MG: 10 INJECTION, POWDER, FOR SOLUTION INTRAVENOUS at 05:44

## 2024-09-14 RX ADMIN — SODIUM CHLORIDE, POTASSIUM CHLORIDE, SODIUM LACTATE AND CALCIUM CHLORIDE: 600; 310; 30; 20 INJECTION, SOLUTION INTRAVENOUS at 15:47

## 2024-09-14 RX ADMIN — DEXAMETHASONE SODIUM PHOSPHATE 10 MG: 4 INJECTION INTRA-ARTICULAR; INTRALESIONAL; INTRAMUSCULAR; INTRAVENOUS; SOFT TISSUE at 14:03

## 2024-09-14 RX ADMIN — ENOXAPARIN SODIUM 40 MG: 100 INJECTION SUBCUTANEOUS at 17:21

## 2024-09-14 RX ADMIN — SENNOSIDES AND DOCUSATE SODIUM 2 TABLET: 50; 8.6 TABLET ORAL at 05:44

## 2024-09-14 RX ADMIN — PROPOFOL 30 MCG/KG/MIN: 10 INJECTION, EMULSION INTRAVENOUS at 01:29

## 2024-09-14 RX ADMIN — FAMOTIDINE 20 MG: 10 INJECTION, SOLUTION INTRAVENOUS at 06:00

## 2024-09-14 RX ADMIN — CEFTRIAXONE SODIUM 2000 MG: 10 INJECTION, POWDER, FOR SOLUTION INTRAVENOUS at 17:21

## 2024-09-14 ASSESSMENT — PULMONARY FUNCTION TESTS: FVC: 1.6

## 2024-09-14 ASSESSMENT — SOCIAL DETERMINANTS OF HEALTH (SDOH)
WITHIN THE LAST YEAR, HAVE YOU BEEN KICKED, HIT, SLAPPED, OR OTHERWISE PHYSICALLY HURT BY YOUR PARTNER OR EX-PARTNER?: PATIENT UNABLE TO ANSWER
WITHIN THE LAST YEAR, HAVE YOU BEEN HUMILIATED OR EMOTIONALLY ABUSED IN OTHER WAYS BY YOUR PARTNER OR EX-PARTNER?: PATIENT UNABLE TO ANSWER
WITHIN THE LAST YEAR, HAVE YOU BEEN AFRAID OF YOUR PARTNER OR EX-PARTNER?: PATIENT UNABLE TO ANSWER
WITHIN THE LAST YEAR, HAVE TO BEEN RAPED OR FORCED TO HAVE ANY KIND OF SEXUAL ACTIVITY BY YOUR PARTNER OR EX-PARTNER?: PATIENT UNABLE TO ANSWER

## 2024-09-14 ASSESSMENT — COPD QUESTIONNAIRES
DURING THE PAST 4 WEEKS HOW MUCH DID YOU FEEL SHORT OF BREATH: NONE/LITTLE OF THE TIME
COPD SCREENING SCORE: 0
HAVE YOU SMOKED AT LEAST 100 CIGARETTES IN YOUR ENTIRE LIFE: NO/DON'T KNOW
DO YOU EVER COUGH UP ANY MUCUS OR PHLEGM?: NO/ONLY WITH OCCASIONAL COLDS OR INFECTIONS

## 2024-09-14 ASSESSMENT — PATIENT HEALTH QUESTIONNAIRE - PHQ9
SUM OF ALL RESPONSES TO PHQ9 QUESTIONS 1 AND 2: 0
1. LITTLE INTEREST OR PLEASURE IN DOING THINGS: NOT AT ALL
2. FEELING DOWN, DEPRESSED, IRRITABLE, OR HOPELESS: NOT AT ALL

## 2024-09-14 ASSESSMENT — PAIN DESCRIPTION - PAIN TYPE
TYPE: ACUTE PAIN

## 2024-09-14 NOTE — CARE PLAN
The patient is Watcher - Medium risk of patient condition declining or worsening    Shift Goals  Clinical Goals: Q4 neuro checks,  Patient Goals: Unable to Assess  Family Goals: Rest, Updates    Progress made toward(s) clinical / shift goals:    Problem: Hemodynamics  Goal: Patient's hemodynamics, fluid balance and neurologic status will be stable or improve  Outcome: Progressing     Problem: Fluid Volume  Goal: Fluid volume balance will be maintained  Outcome: Progressing     Problem: Pain - Standard  Goal: Alleviation of pain or a reduction in pain to the patient’s comfort goal  Outcome: Progressing     Problem: Skin Integrity  Goal: Skin integrity is maintained or improved  Outcome: Progressing     Problem: Fall Risk  Goal: Patient will remain free from falls  Outcome: Progressing       Patient is not progressing towards the following goals:

## 2024-09-14 NOTE — CARE PLAN
The patient is Watcher - Medium risk of patient condition declining or worsening    Shift Goals  Clinical Goals: Increased wakefulness  Patient Goals: COURTNEY  Family Goals: Improvement of diagnosis    Progress made toward(s) clinical / shift goals:      Improvement in neuro exam today.    Problem: Pain - Standard  Goal: Alleviation of pain or a reduction in pain to the patient’s comfort goal  Outcome: Progressing     Problem: Safety - Medical Restraint  Goal: Remains free of injury from restraints (Restraint for Interference with Medical Device)  Outcome: Progressing  Flowsheets (Taken 9/13/2024 1942)  Addressed this shift: Remains free of injury from restraints (restraint for interference with medical device):   Determine that other, less restrictive measures have been tried or would not be effective before applying the restraint   Evaluate the patient's condition at the time of restraint application   Inform patient/family regarding the reason for restraint   Every 2 hours: Monitor safety, psychosocial status, comfort, nutrition and hydration       Patient is not progressing towards the following goals:      Problem: Safety - Medical Restraint  Goal: Free from restraint(s) (Restraint for Interference with Medical Device)  Outcome: Not Progressing  Flowsheets (Taken 9/13/2024 1942)  Addressed this shift: Free from restraint(s) (restraint for interference with medical device):   ONCE/SHIFT or MINIMUM Every 12 hours: Assess and document the continuing need for restraints   Every 24 hours: Continued use of restraint requires Licensed Independent Practitioner to perform face to face examination and written order   Identify and implement measures to help patient regain control

## 2024-09-14 NOTE — CARE PLAN
Problem: Ventilation  Goal: Ability to achieve and maintain unassisted ventilation or tolerate decreased levels of ventilator support  Description: Target End Date:  4 days     Document on Vent flowsheet    1.  Support and monitor invasive and noninvasive mechanical ventilation  2.  Monitor ventilator weaning response  3.  Perform ventilator associated pneumonia prevention interventions  4.  Manage ventilation therapy by monitoring diagnostic test results  Outcome: Progressing     Ventilator Daily Summary    Vent Day #3  Airway: 8.0@24    Ventilator settings: 16/400/8/30%  Weaning trials:   Respiratory Procedures:     Plan: Continue current ventilator settings and wean mechanical ventilation as tolerated per physician orders.

## 2024-09-14 NOTE — CARE PLAN
The patient is Watcher - Medium risk of patient condition declining or worsening    Shift Goals  Clinical Goals: Q4 neuro checks,  Patient Goals: Unable to Assess  Family Goals: Rest, Updates    Progress made toward(s) clinical / shift goals:    Problem: Hemodynamics  Goal: Patient's hemodynamics, fluid balance and neurologic status will be stable or improve  Outcome: Progressing     Problem: Fluid Volume  Goal: Fluid volume balance will be maintained  Outcome: Progressing     Problem: Pain - Standard  Goal: Alleviation of pain or a reduction in pain to the patient’s comfort goal  Outcome: Progressing     Problem: Skin Integrity  Goal: Skin integrity is maintained or improved  Outcome: Progressing     Problem: Fall Risk  Goal: Patient will remain free from falls  Outcome: Progressing     Problem: Safety - Medical Restraint  Goal: Remains free of injury from restraints (Restraint for Interference with Medical Device)  Outcome: Progressing       Patient is not progressing towards the following goals:      Problem: Safety - Medical Restraint  Goal: Free from restraint(s) (Restraint for Interference with Medical Device)  Outcome: Not Progressing

## 2024-09-14 NOTE — PROGRESS NOTES
"Critical Care Progress Note    Date of admission  2024    Chief Complaint  Altered mental status    Hospital Course  Kwesi Rivera is a 34 year old male with no significant PMH who is an active  who presented as a transfer from [facility]  with altered mental status. He presented to OSH with headaches, nonverbal, with GCS 9. No focal deficits and unable to follow commands. CT/CTA showed some diffuse edema bilateral hemispheres. He transferred to [facility] for further Neurological evaluation.   On arrival here he was intubated and underwent LP with \"milky\" CSF.   PCR showed Strep Pneumo with > 15,000 WBC and > 600 protein on CSF. He was admitted to ICU and started on Vanco, Ceftriaxone, and steroids.  Brain MRI without evidence of venous thrombosis.   - VD #2. ID consult - Continue IV vancomycin and IV ceftriaxone. Following commands.    Interval Problem Update  Reviewed last 24 hour events:  Tmax 100.6  SB 38-50's  -120's. No Drips. SBP goal < 160. No PRN's required.  RASS +2 to -2. Prop @ 30. Fent @ 100  Neuro: PERRLA. Opens eyes to verbal stimulation, not following but purposeful 5/5.  Vent day #3: APVC 16/400/8/30%  AB.44/33/90  SAT/SBT: Yes/yes in process  NG with TF. BM PTA  I/O: 3700/4000  PIV x 4, Cordero  Lovenox, Pepcid, Day 3 of ABX  Mobility 1 - eligible to advance as tolerated    MRI C-spine and T-spine unremarkable. MRI L-spine showed T2 hypodense fluid filled level in the dependent caudal thecal sac possibly representing intradural exudate. Discussed at bedside with ID who recommended asking neurosurgery to review imaging. I reached out to Dr. Flores via [employer] who reviewed imaging and does not see need for surgical intervention as the fluid collection is in the thecal sac inside the CSF.  He does have disc herniation at L5-S1 which is possibly contributing to his back pain.  I have updated the wife at bedside.    Review of Systems  Review of Systems   Unable to " perform ROS: Intubated        Vital Signs for last 24 hours   Pulse:  [20-60] 44  Resp:  [12-22] 16  BP: ()/(57-75) 130/75  SpO2:  [96 %-100 %] 97 %    Hemodynamic parameters for last 24 hours       Respiratory Information for the last 24 hours  Vent Mode: APVCMV  Rate (breaths/min): 16  Vt Target (mL): 400  PEEP/CPAP: 8  MAP: 11  Control VTE (exp VT): 396    Physical Exam   Physical Exam  Vitals and nursing note reviewed. Exam conducted with a chaperone present.   Constitutional:       General: He is not in acute distress.     Appearance: He is ill-appearing.      Interventions: He is sedated and intubated.   HENT:      Head: Normocephalic.      Mouth/Throat:      Mouth: Mucous membranes are moist.   Eyes:      Extraocular Movements: Extraocular movements intact.      Pupils: Pupils are equal, round, and reactive to light.   Cardiovascular:      Rate and Rhythm: Normal rate and regular rhythm.      Pulses: Normal pulses.   Pulmonary:      Effort: Pulmonary effort is normal. No respiratory distress. He is intubated.   Abdominal:      General: There is no distension.      Palpations: Abdomen is soft.      Tenderness: There is no abdominal tenderness. There is no guarding or rebound.   Musculoskeletal:         General: Normal range of motion.      Cervical back: Normal range of motion and neck supple.   Skin:     General: Skin is warm and dry.      Capillary Refill: Capillary refill takes less than 2 seconds.   Neurological:      GCS: GCS eye subscore is 1. GCS verbal subscore is 1. GCS motor subscore is 5.      Motor: Weakness present.      Comments: 7T   Psychiatric:      Comments: Intubated/Sedated         Medications  Current Facility-Administered Medications   Medication Dose Route Frequency Provider Last Rate Last Admin    Pharmacy Consult: Enteral tube insertion - review meds/change route/product selection  1 Each Other PHARMACY TO DOSE Jeremy Dowell M.D.        gadoteridol (Prohance) injection 15 mL   15 mL Intravenous Once KORI Millan MD Alert...Vancomycin per Pharmacy   Other PHARMACY TO DOSE Mauri Bowser M.D.        cefTRIAXone (Rocephin) syringe 2,000 mg  2,000 mg Intravenous BID Mauri Bowser M.D.   2,000 mg at 09/14/24 0544    fentaNYL (Sublimaze) injection 50 mcg  50 mcg Intravenous Q15 MIN PRN Mauri Bowser M.D.   50 mcg at 09/13/24 1628    And    fentaNYL (Sublimaze) injection 100 mcg  100 mcg Intravenous Q15 MIN PRN Mauri Bowser M.D.        And    fentaNYL (SUBLIMAZE) 50 mcg/mL in 50mL (Continuous Infusion)   Intravenous Continuous Mauri Bowser M.D. 2 mL/hr at 09/14/24 0724 100 mcg/hr at 09/14/24 0724    And    propofol (DIPRIVAN) injection  0-80 mcg/kg/min (Ideal) Intravenous Continuous Mauri Bowser M.D. 5.9 mL/hr at 09/14/24 1034 15 mcg/kg/min at 09/14/24 1034    Respiratory Therapy Consult   Nebulization Continuous RT Mauri Bowser M.D.        acetaminophen (Tylenol) tablet 650 mg  650 mg Enteral Tube Q6HRS PRN Mauri Bowser M.D.   650 mg at 09/13/24 0458    senna-docusate (Pericolace Or Senokot S) 8.6-50 MG per tablet 2 Tablet  2 Tablet Enteral Tube BID Mauri Bowser M.D.   2 Tablet at 09/14/24 0544    And    polyethylene glycol/lytes (Miralax) Packet 1 Packet  1 Packet Enteral Tube QDAY PRN Mauri Bowser M.D.        hydrALAZINE (Apresoline) injection 10 mg  10 mg Intravenous Q4HRS PRN Mauri Bowser M.D.        labetalol (Normodyne/Trandate) injection 10 mg  10 mg Intravenous Q4HRS PRN Mauri Bowser M.D.        famotidine (Pepcid) tablet 20 mg  20 mg Enteral Tube Q12HRS Mauri Bowser M.D.   20 mg at 09/13/24 1855    Or    famotidine (Pepcid) injection 20 mg  20 mg Intravenous Q12HRS Mauri Bowser M.D.   20 mg at 09/14/24 0600    MD Alert...ICU Electrolyte Replacement per Pharmacy   Other PHARMACY TO DOSE Mauri Bowser M.D.        lidocaine (Xylocaine) 1 % injection 2 mL  2 mL Tracheal Tube Q30 MIN PRN Mauri Bowser M.D.        dexamethasone (Decadron)  injection 10 mg  10 mg Intravenous Q6HRS Mauri Bowser M.D.   10 mg at 09/14/24 0820    vancomycin (Vancocin) 1,250 mg in  mL IVPB  1,250 mg Intravenous Q8HR Mauri Bowser M.D.   Stopped at 09/14/24 0746    insulin regular (HumuLIN R,NovoLIN R) injection  2-9 Units Subcutaneous Q6HRS Yeni L. Latona   2 Units at 09/13/24 0809    And    dextrose 50% (D50W) injection 25 g  25 g Intravenous Q15 MIN PRN Yeni LPromise Latona        lactated ringers infusion   Intravenous Continuous Yeni L. Latona 100 mL/hr at 09/13/24 2100 New Bag at 09/13/24 2100    ondansetron (Zofran) syringe/vial injection 4 mg  4 mg Intravenous Q6HRS PRN Yeni Garcia        enoxaparin (Lovenox) inj 40 mg  40 mg Subcutaneous DAILY AT 1800 Mauri Bowser M.D.   40 mg at 09/13/24 1854    LR (Bolus) infusion 2,382 mL  30 mL/kg Intravenous Once PRN Mauri Bowser M.D.           Fluids    Intake/Output Summary (Last 24 hours) at 9/14/2024 1126  Last data filed at 9/14/2024 0930  Gross per 24 hour   Intake 2813.41 ml   Output 2050 ml   Net 763.41 ml       Laboratory  Recent Labs     09/12/24  2203 09/13/24  0444 09/14/24  0411   ISTATAPH 7.294* 7.418 7.442   ISTATAPCO2 39.7* 30.1 33.0   ISTATAPO2 89* 84 90*   ISTATATCO2 20 20 24   DVDFNKL1CNG 96 97 97   ISTATARTHCO3 19.3 19.4 22.5   ISTATARTBE -7* -4 -1   ISTATTEMP 37.5 C 100.8 F 98.6 F   ISTATFIO2 30 30 30   ISTATSPEC Arterial Arterial Arterial   ISTATAPHTC 7.287* 7.400 7.442   [id number] 92* 91* 90*         Recent Labs     09/12/24  1803 09/13/24  0805 09/14/24  0628   SODIUM 136 137 143   POTASSIUM 3.8 4.4 4.6   CHLORIDE 101 104 110   CO2 18* 20 20   BUN 12 9 12   CREATININE 0.92 0.84 0.68   MAGNESIUM  --  1.8 2.6*   PHOSPHORUS  --  2.6 3.0   CALCIUM 8.7 8.4* 8.6     Recent Labs     09/12/24  1803 09/13/24  0805 09/14/24  0628   ALTSGPT 16 15 14   ASTSGOT 21 18 16   ALKPHOSPHAT 39 32 35   TBILIRUBIN 1.2 1.0 0.8   GLUCOSE 192* 165* 165*     Recent Labs     09/12/24  1803 09/13/24  0805  "09/14/24  0628   WBC 8.8 9.4 10.6   NEUTSPOLYS 93.10*  --   --    LYMPHOCYTES 3.10*  --   --    MONOCYTES 3.30  --   --    EOSINOPHILS 0.00  --   --    BASOPHILS 0.20  --   --    ASTSGOT 21 18 16   ALTSGPT 16 15 14   ALKPHOSPHAT 39 32 35   TBILIRUBIN 1.2 1.0 0.8     Recent Labs     09/12/24  1803 09/13/24  0805 09/14/24  0628   RBC 4.97 4.52* 4.38*   HEMOGLOBIN 14.6 13.4* 12.8*   HEMATOCRIT 43.6 39.0* 39.4*   PLATELETCT 178 140* 141*   PROTHROMBTM 13.3  --   --    INR 1.00  --   --        Imaging  MRI:   Reviewed    Assessment/Plan  * Streptococcus pneumoniae meningitis- (present on admission)  Assessment & Plan  -presented with AMS and headaches  -LP done in ED \"milky\" showed greater than 15,000 WBC and greater than 600 protein  -PCR positive Streptococcus pneumoniae  -ID following. Continue current ABX regimen for now. Follow sensitivities  -Serial neurologic exams    Lumbar disc herniation  Assessment & Plan  -Patient complained of back pain prior to presentation  -MRI showed disc herniation L5-S1  -Multimodal pain management  -Outpatient neurosurgery follow-up    Acute encephalopathy  Assessment & Plan  -Due to bacterial meningitis  -Keep patient awake during the day and avoid daytime naps. Remove all unnecessary lines (central lines, peripheral IVs, feeding tubes, ho catheters).  -Avoid polypharmacy, frequent re-orientation, maximize family time at bedside, use glasses and hearing aids if needed, treat pain, encourage ambulation, minimize benzos/anticholinergic agents.   -Aspiration precautions  -Seizure precautions  -Fall precautions      Sepsis due to Streptococcus pneumoniae with encephalopathy without septic shock (HCC)  Assessment & Plan  This is Sepsis Present on admission  SIRS criteria identified on my evaluation include: Tachycardia, with heart rate greater than 90 BPM  Clinical indicators of end organ dysfunction include Toxic Metabolic Encephalopathy and GCS < 15  Source is Bacterial " Meningitis  Sepsis protocol initiated  Crystalloid Fluid Administration: Resuscitation volume of 0 ordered. Reason that resuscitation volume of less than 30ml/kg was ordered  His hemodynamics improved with intubation and he does not require fluids at this time.  PRN 30cc/kg LR bolus ordered for MAP <65 or SBP <90  IV antibiotics as appropriate for source of sepsis  Reassessment: I have reassessed the patient's hemodynamic status    Continue ABX per ID recommendations.  Continue steroids  Follow cultures  Hemodynamically stable off pressors    On mechanically assisted ventilation (HCC)  Assessment & Plan  -Intubated for altered mental status  -Intubation date 9/12  -Ventilator dependent respiratory failure  -Modify ventilator to optimize oxygenation, acid-base balance and ventilation  -CXR as indicated: monitor lung volumes and tube/line placement  -HOB > 30  -Titrate FiO2 to keep sats greater than 92%  -Chlorhexidine  -goal CO2 35-40  -Daily awakening and SBT trials unless contraindicated  -ABCDEF bundle  -I am actively adjusting ventilator based on clinical indicators and ABG's         VTE:  Lovenox  Ulcer: H2 Antagonist  Lines: Cordero Catheter  Ongoing indication addressed    I have performed a physical exam and reviewed and updated ROS and Plan today (9/14/2024). In review of yesterday's note (9/13/2024), there are no changes except as documented above.     Discussed patient condition and risk of morbidity and/or mortality with Family, RN, RT, Pharmacy, Patient, infectious disease and neurosurgery, and my attending Dr. Dowell.  The patient remains critically ill.  Critical care time = 55 minutes in directly providing and coordinating critical care and extensive data review.  No time overlap and excludes procedures.    Please note that this dictation was created using voice recognition software. I have made every reasonable attempt to correct obvious errors, but there may be errors of grammar and possibly content  that I did not discover before finalizing the note.    NAOMI Millan.

## 2024-09-14 NOTE — PROGRESS NOTES
Infectious Disease Progress Note    Author: Carol Rousseau M.D. Date & Time of service: 2024  8:30 AM    Chief Complaint:  Streptococcus pneumoniae meningitis     Interval History:    Review of Systems:  Review of Systems   Unable to perform ROS: Intubated       Hemodynamics:  No data recorded.  Monitored Temp: 37 °C (98.6 °F)  Pulse  Av.7  Min: 20  Max: 117   Blood Pressure: 130/75       Physical Exam:  Physical Exam  Cardiovascular:      Rate and Rhythm: Regular rhythm. Bradycardia present.   Pulmonary:      Effort: Pulmonary effort is normal.      Breath sounds: Normal breath sounds.   Abdominal:      General: Abdomen is flat. Bowel sounds are normal.   Skin:     General: Skin is warm.   Neurological:      Comments: Intubated and sedated         Meds:    Current Facility-Administered Medications:     gadoteridol    MD Alert...Vancomycin per Pharmacy    cefTRIAXone (ROCEPHIN) IV    fentaNYL **AND** fentaNYL **AND** fentaNYL **AND** propofol **AND** Triglyceride    Respiratory Therapy Consult    acetaminophen    senna-docusate **AND** polyethylene glycol/lytes    hydrALAZINE    labetalol    famotidine **OR** famotidine    MD Alert...Adult ICU Electrolyte Replacement per Pharmacy    lidocaine    dexamethasone    vancomycin    insulin regular **AND** POC blood glucose manual result **AND** NOTIFY MD and PharmD **AND** Administer 20 grams of glucose (approximately 8 ounces of fruit juice) every 15 minutes PRN FSBG less than 70 mg/dL **AND** dextrose bolus    LR    ondansetron    enoxaparin (LOVENOX) injection    LR    Labs:  Recent Labs     24  1803 24  0805 24  0628   WBC 8.8 9.4 10.6   RBC 4.97 4.52* 4.38*   HEMOGLOBIN 14.6 13.4* 12.8*   HEMATOCRIT 43.6 39.0* 39.4*   MCV 87.7 86.3 90.0   MCH 29.4 29.6 29.2   RDW 39.7 40.3 43.8   PLATELETCT 178 140* 141*   MPV 9.0 9.4 10.2   NEUTSPOLYS 93.10*  --   --    LYMPHOCYTES 3.10*  --   --    MONOCYTES 3.30  --   --    EOSINOPHILS 0.00  --    --    BASOPHILS 0.20  --   --      Recent Labs     09/12/24  1803 09/13/24  0805 09/14/24  0628   SODIUM 136 137 143   POTASSIUM 3.8 4.4 4.6   CHLORIDE 101 104 110   CO2 18* 20 20   GLUCOSE 192* 165* 165*   BUN 12 9 12     Recent Labs     09/12/24  1803 09/13/24  0805 09/14/24  0628   ALBUMIN 4.2 3.9 3.3   TBILIRUBIN 1.2 1.0 0.8   ALKPHOSPHAT 39 32 35   TOTPROTEIN 6.7 6.4 6.0   ALTSGPT 16 15 14   ASTSGOT 21 18 16   CREATININE 0.92 0.84 0.68       Imaging:  MR-LUMBAR SPINE-WITH & W/O    Result Date: 9/13/2024 9/13/2024 4:53 PM HISTORY/REASON FOR EXAM:  Back pain with known meningitis. TECHNIQUE/EXAM DESCRIPTION: MRI of the lumbar spine without and with contrast. The study was performed on a [employer] 1.5 Blanca MRI scanner. T1 sagittal, T2 fast spin-echo sagittal, and T2 axial images were obtained of the lumbar spine. T1 post-contrast fat-suppressed sagittal images were obtained. Optional T2 fat-suppressed sagittal and T1 post-contrast axial images may be obtained. 15 mL ProHance gadolinium contrast was administered intravenously. COMPARISON:  MRI thoracic spine from today's date FINDINGS: Alignment in the lumbar spine is normal. Marrow signal in the vertebral bodies is normal. There is no abnormal osseous enhancement. There is no evidence of discitis or osteomyelitis. There is no evidence of epidural phlegmon or epidural abscess. The conus is normal in position and signal with its tip at the L2 level. There is no abnormal cord enhancement. There is no abnormal intradural extra medullary enhancement. There is no abnormal enhancement of nerve roots of the cauda equina. However, there is T2 hypointense fluid-fluid level in the dependent caudal thecal sac. In keeping with history of meningitis, this may represent intradural exudate. Dependently layering subarachnoid hemorrhage within the thecal sac could have a similar appearance. (2 sagittal image 8, series 17, T2 fat-suppressed sagittal image 8, series 20, T2 axial  image 4, series 22). At T12-L1, no abnormality. At L1-2, no abnormality. At L2-3, no abnormality. At L3-4, no abnormality. At L4-5, there is a tiny midline disc bulge with an underlying tiny annular fissure (T2 axial image 13, series 21). No central or foraminal stenosis. At L5-S1, there is a large left paramedian disc protrusion/extrusion. There is prominent impingement on the left ventral thecal sac. There is marked left lateral recess stenosis with posterior displacement of the emerging S1 nerve root (T2 axial 9, series 21). There is mild hypertrophic facet arthropathy. There is mild right foraminal stenosis and moderate left stenosis.     1.  T2 hypointense fluid-fluid level in the dependent caudal thecal sac in keeping with the history of meningitis. This likely represents intradural exudate in the setting of meningitis. Dependently layering subarachnoid hemorrhage within the thecal sac could have a similar appearance. 2.  L4-5 tiny midline disc bulge with underlying tiny annular fissure. No central or foraminal stenosis. 3.  L5-S1 large left paramedian disc protrusion/extrusion with marked compromise of the emerging left S1 nerve root. Mild right foraminal stenosis. Moderate left foraminal stenosis.    MR-THORACIC SPINE-WITH & W/O    Result Date: 9/13/2024 9/13/2024 4:52 PM HISTORY/REASON FOR EXAM:  Back pain with known meningitis. TECHNIQUE/EXAM DESCRIPTION: MRI of the thoracic spine without and with contrast. The study was performed on a [employer] Sign[employer] 1.5 Blanca MRI scanner. T1 sagittal, T2 fast spin-echo sagittal, and T2 axial images were obtained of the thoracic spine. T1 post-contrast fat suppressed sagittal images were obtained. Optional T1 post-contrast axial images may be obtained. 15 mL ProHance gadolinium contrast was administered intravenously. COMPARISON:  MRI lumbar spine from today's date FINDINGS: Alignment in the thoracic spine is normal. Marrow signal in the vertebral bodies is normal. There is  no abnormal osseous enhancement. There is no evidence of discitis or osteomyelitis. The prevertebral and paraspinous soft tissues are unremarkable except for bibasilar atelectatic infiltrates and tiny bilateral pleural effusions. The thoracic spinal cord is normal in caliber and signal throughout its course. There is no abnormal cord enhancement. There is no abnormal intradural extra medullary enhancement. There is no significant disc bulge or protrusion, central stenosis, or foraminal stenosis at any thoracic level.     MRI OF THE THORACIC SPINE WITHOUT AND WITH CONTRAST WITHIN NORMAL LIMITS.    MR-CERVICAL SPINE-WITH & W/O    Result Date: 9/13/2024 9/13/2024 4:52 PM HISTORY/REASON FOR EXAM:  Back pain with known meningitis. TECHNIQUE/EXAM DESCRIPTION: MRI of the cervical spine without and with contrast. The study was performed on a [employer] 1.5 Blanca MRI scanner. T1 sagittal, T2 fast spin-echo sagittal, and gradient echo axial images were obtained of the cervical spine. Optional T2 fat-suppressed sagittal images may also be obtained. T1 post-contrast fat suppressed sagittal images were obtained of the cervical spine. Optional T1 post-contrast axial images may be obtained. 15 mL ProHance gadolinium contrast was administered intravenously. COMPARISON: CT cervical spine outside films 9/12/2024 FINDINGS: Alignment in the cervical spine shows no segmental subluxation. Marrow signal in the vertebral bodies is normal. There is no abnormal osseous enhancement. The prevertebral soft tissues are unremarkable except for some secretions in the pharynx with endotracheal tube and enteric tube in situ. There are no anomalies at the craniovertebral junction.  The cervical spinal cord is normal in caliber and signal throughout its course. There is no abnormal cord enhancement. There is no abnormal intradural extra medullary enhancement. At C2-3, no abnormality. At C3-4, no abnormality. At C4-5, no abnormality. At C5-6, minimal  disc-osteophyte complex. Some thinning of ventral subarachnoid space. Dorsal subarachnoid space remains intact. There is no central stenosis. The neural foramina are intact. At C6-7, no significant disc bulge or protrusion. No central stenosis. The left neural foramen is intact. There is moderate-marked right foraminal stenosis due to spondylotic change. At C7-T1, no abnormality.     1.  C5-6 minimal midline disc/osteophyte complex. No central or foraminal stenosis. 2.  C6-C7 moderate-marked right foraminal stenosis due to spondylotic change. 3.  No myelopathic cord signal. Concerning the history of meningitis, no abnormal intradural extra medullary enhancement.    MR-VENOGRAM (MRV) HEAD    Result Date: 9/13/2024 9/12/2024 8:35 PM HISTORY/REASON FOR EXAM:  ALTERED LEVEL OF CONSCIOUSNESS. TECHNIQUE/EXAM DESCRIPTION: MR venogram of the cerebral veins-time-of-flight sequences COMPARISON:  None FINDINGS: The superior sagittal sinus is widely patent. There is widely patent dominant RIGHT transverse and sigmoid sinuses. There is hypoplastic LEFT transverse and sigmoid sinuses.     Unremarkable MR venogram of the cerebral veins.    MR-BRAIN-WITH & W/O    Result Date: 9/13/2024 9/12/2024 8:35 PM HISTORY/REASON FOR EXAM:  ALTERED LEVEL OF CONSCIOUSNESS. TECHNIQUE/EXAM DESCRIPTION: MR brain with and without contrast. Multiplanar multisequence MR examination of the brain with and without contrast done on 1.5 MRI scanner. 15 mL ProHance contrast was administered intravenously. COMPARISON:  None. FINDINGS: The FLAIR images demonstrates diffuse hyperintense signal within the subarachnoid spaces. There is also minimal abnormal ependymal T2 hyperintensity. There is no hydrocephalus. There is no abnormal contrast enhancement. There is no restricted diffusion, acute hemorrhage, hydrocephalus, intracranial space-occupying lesion, abnormal volume loss, vasogenic edema or mass effect. There is mild tonsillar ectopia. The gray matter  structures are unremarkable. The supra and infratentorial white matter is unremarkable.  There is no subdural or epidural space-occupying process. The visualized flow voids of the cerebral vasculature are unremarkable.  There is no large lesion identified in the expected course of the intracranial portions of the cranial nerves. The visualized bones, muscles, adipose tissue and the glands are unremarkable. There is mucosal thickening in the paranasal sinuses.     1.  The FLAIR images demonstrates diffuse hyperintense signal within the subarachnoid spaces. There is also minimal abnormal ependymal T2 hyperintensity. There is no hydrocephalus. There is no abnormal contrast enhancement. This finding is concerning for  acute Meningitis/meningeal inflammation. Please correlate with the CSF study. 2.  Mild tonsillar ectopia.    DX-CHEST-PORTABLE (1 VIEW)    Result Date: 9/12/2024 9/12/2024 6:36 PM HISTORY/REASON FOR EXAM:  Shortness of Breath TECHNIQUE/EXAM DESCRIPTION AND NUMBER OF VIEWS: Single portable view of the chest. COMPARISON: None FINDINGS: Tracheal tube tip is below the level of the clavicles. NG tube is in the proximal stomach. Heart size is within normal limits. No focal infiltrates or consolidations are identified in the lungs. No pleural fluid collections are identified. No pneumothorax is appreciated.     No acute cardiac or pulmonary abnormality is identified.      Micro:  Results       Procedure Component Value Units Date/Time    MRSA By PCR (Amp) [[id number]] Collected: 09/12/24 2205    Order Status: Completed Specimen: Respirate from Nares Updated: 09/13/24 1230     MRSA by PCR Negative    CSF CULTURE [[id number]]  (Abnormal) Collected: 09/12/24 1946    Order Status: Completed Specimen: CSF from Tap Updated: 09/13/24 0713     Significant Indicator POS     Source CSF     Site TAP     Culture Result -     Gram Stain Result Many WBCs.  Moderate Gram positive cocci.       Culture Result Streptococcus  pneumoniae  Moderate growth      Narrative:      [facility] tel. [phone] 09/13/2024, 07:13, RB PERF. RESULTS CALLED TO:[id number]  ER tel.  09/12/2024, 20:50, RB PERF. RESULTS CALLED TO: Hallie GILLIAM [phone]    BLOOD CULTURE [[id number]] Collected: 09/12/24 2220    Order Status: Completed Specimen: Blood from Peripheral Updated: 09/13/24 0008     Significant Indicator NEG     Source BLD     Site PERIPHERAL     Culture Result No Growth  Note: Blood cultures are incubated for 5 days and  are monitored continuously.Positive blood cultures  are called to the RN and reported as soon as  they are identified.      BLOOD CULTURE [[id number]] Collected: 09/12/24 2205    Order Status: Completed Specimen: Blood from Peripheral Updated: 09/13/24 0008     Significant Indicator NEG     Source BLD     Site PERIPHERAL     Culture Result No Growth  Note: Blood cultures are incubated for 5 days and  are monitored continuously.Positive blood cultures  are called to the RN and reported as soon as  they are identified.      GRAM STAIN [[id number]]  (Abnormal) Collected: 09/12/24 1946    Order Status: Completed Specimen: CSF Updated: 09/12/24 2051     Significant Indicator .     Source CSF     Site TAP     Gram Stain Result Many WBCs.  Moderate Gram positive cocci.      Narrative:      ER tel.  09/12/2024, 20:50, RB PERF. RESULTS CALLED TO: Hallie GILLIAM [phone]    URINALYSIS [[id number]]  (Abnormal) Collected: 09/12/24 1843    Order Status: Completed Specimen: Urine Updated: 09/12/24 1918     Color Orange     Character Clear     Specific Gravity >=1.045     Ph 5.0     Glucose 500 mg/dL      Ketones 80 mg/dL      Protein Negative mg/dL      Bilirubin Negative     Urobilinogen, Urine 0.2     Nitrite Negative     Leukocyte Esterase Negative     Occult Blood Moderate     Micro Urine Req Microscopic    URINALYSIS [[id number]]     Order Status: Canceled Specimen: Urine             Assessment:  Active Hospital Problems    Diagnosis     *Streptococcus pneumoniae  meningitis [G00.1]     On mechanically assisted ventilation (HCC) [Z99.11]     Sepsis due to Streptococcus pneumoniae with encephalopathy without septic shock ([facility]) [A40.3, R65.20, G93.41]      Interval 24 hours:      AF, O2 Vent 8/30%   Labs reviewed to assess for clinical status, drug toxicity and organ function  Imaging personally reviewed both images and report.   Micro reviewed    Patient on minimal vent settings, no pressors.  Antibiotics as below.    Assessment:  Kwesi Rivera is a 34 y.o. man with no prior medical history admitted from [facility] secondary to acute encephalopathy and headache.  Lumbar puncture on 9/12 consistent with bacterial meningitis with both CSF cultures and meningitis/encephalitis PCR panel positive for Streptococcus pneumoniae.  Blood cultures on admission are negative to date.     Streptococcus pneumoniae meningitis       Ventilator dependent respiratory failure  Acute encephalopathy, secondary to above  Back pain, prior to admission, concern for infection and lumbar spine  -MRI lumbar spine with fluid in the T2 caudal thecal sac, reported as consistent with meningitis likely represents intradural exudate  -MRI cervical thoracic spine with no obvious infectious finding  Bradycardia      Plan:  -Continue IV vancomycin and IV ceftriaxone 2 g every 12 hours pending strep pneumoniae susceptibilities, anticipate 14-day antibiotic course  -Monitor renal function and Vanco trough  -CSF cultures+ Streptococcus pneumonia.  Follow susceptibilities.  Also requested susceptibility to levofloxacin  -Continue dexamethasone for 4 days  -Continue to monitor neurologic status  - Discuss finding of Lumbar MRI (T2 area fluid) with neurosurgery to see if any drainage or other invention would be warranted or recommendations on repeat imaging       This infection poses a threat to life and further neurologic function     Disposition: TBD     Need for PICC line: TBD     Plan of care discussed with  intensivist, Dr. Dowell, ICU APRN, Tamar Jc and wife at bedside. Will continue to follow

## 2024-09-14 NOTE — THERAPY
"Speech Language Pathology   Clinical Swallow Evaluation     Patient Name: Kwesi Rivera  AGE:  34 y.o., SEX:  male  Medical Record #: [id number]  Date of Service: 9/14/2024      History of Present Illness  34 y.o. male who presented from OSH on 9/12 with AMS. At OSH, pt with headaches, nonverbal, GCS 9, no focal deficits and unable to follow commands. Pt transferred to [facility] for further neurological evaluation. Pt intubated 9/12- 9/14. Found to have streptococcus pneumoniae meningitis.     CMHx: streptococcus pneumoniae meningitis, lumbar disc herniation, sepsis    No past medical history on file.    MR-Brain:  \"1.  The FLAIR images demonstrates diffuse hyperintense signal within the subarachnoid spaces. There is also minimal abnormal ependymal T2 hyperintensity. There is no hydrocephalus. There is no abnormal contrast enhancement. This finding is concerning for acute Meningitis/meningeal inflammation. Please correlate with the CSF study.  2.  Mild tonsillar ectopia.\"    CXR 9/12:  No acute cardiac or pulmonary abnormality is identified.      General Information:  Vitals  O2 (LPM): 4  O2 Delivery Device: Silicone Nasal Cannula  Level of Consciousness: Awake  Patient Behaviors: Lethargic  Orientation: Self, General place, Situation  Follows Directives: Yes - simple commands only      Prior Living Situation & Level of Function:  Prior Services: None  Housing / Facility: Unable To Determine At This Time  Communication: Unknown PLOF  Swallowing: Uknown PLOF       Oral Mechanism Evaluation:  Dentition: Good, Natural dentition   Facial Symmetry: Equal  Facial Sensation: Equal     Labial Observations: WFL   Lingual Observations: Midline          Laryngeal Function:  Secretion Management: Adequate  Voice Quality: Whisper   Cough: Perceptually WNL       Subjective  Patient asleep upon arrival, roused to verbal cues. Repositioned to upright in bed by SLP and RN. Pt answering some questions; however, slow to respond and with low " volume despite cues. Pt drowsy upon SLP arrival, however, lethargy increased as evaluation progressed. Pt's spouse in room at end of session.        Assessment  Current Method of Nutrition: NPO until cleared by speech pathology  Positioning: Estrada's (60-90 degrees)  Bolus Administration: SLP, Patient  O2 (LPM): 4   O2 Delivery Device: Silicone Nasal Cannula  Factor(s) Affecting Performance: Impaired endurance  Tracheostomy : No       Swallowing Trials:  Swallowing Trials  Ice: WFL  Thin Liquid (TN0): Impaired  Liquidised (LQ3): Impaired      Comments: Patient able to bring cup to oral cavity with close assistance from SLP. Appropriate labial assimilation to cup and spoon; no anterior bolus loss. Oral bolus hold and suspected delayed swallow trigger appreciated with thin liquid trials and applesauce x1. No reflexive cough appreciated; however, consistent throat clear. Difficult to assess for vocal quality this date related to reduced verbalizations from pt. Pt noted to become more lethargic and closing eyes; thus, further trials deferred d/t concern for safety. Provided education regarding plan for dysphagia management, including possible instrumental swallow study pending clinical progress; pt's spouse stated understanding. RN updated.       Clinical Impressions  Patient presents with clinical indicators possibly concerning for oropharyngeal dysphagia. Limited PO observed this date 2/2 pt lethargy. Recommend NPO except ice chips with staff for comfort, oral hydration, reduced risk of disuse atrophy, and pharyngeal secretion management. SLP to follow to reassess swallow function to determine pt appropriateness for a diagnostic swallow study vs. initiation of an oral diet.       Recommendations  Diet Consistency: NPO/NN  Instrumentation: Instrumental swallow study pending clinical progress  Medication: Non Oral (Defer to MD)  Oral Care: Q4h         SLP Treatment Plan  Treatment Plan: Dysphagia Treatment  SLP  "Frequency: 4x Per Week  Estimated Duration: Until Therapy Goals Met      Anticipated Discharge Needs  Discharge Recommendations: Recommend post-acute placement for additional speech therapy services prior to discharge home   Therapy Recommendations Upon DC: Dysphagia Training, Patient / Family / Caregiver Education        Patient / Family Goals  Patient / Family Goal #1: \"To fly\"  Short Term Goals  Short Term Goal # 1: Pt will participate in prefeeding trials with SLP without overt s/sx of aspiration      JAISON Walsh   "

## 2024-09-14 NOTE — DIETARY
"Nutrition Services: Initial Assessment     Day 2 of admit. Kwesi Rivera is a 34 y.o. male with admitting DX of Encephalitis [G04.90]     Consult received for enteral nutrition     Nutrition Assessment:      Height: 175.3 cm (5' 9\")  Weight: 81.6 kg (179 lb 14.3 oz)  Weight to Use in Calculations: 81.6 kg (179 lb 14.3 oz)   Body mass index is 26.57 kg/m². BMI classification: Overweight.  Wt Readings from Last 6 Encounters:   24 81.6 kg (179 lb 14.3 oz)   08/15/22 79.4 kg (175 lb)   22 79.4 kg (175 lb)      Calculation/Equation:   REE per MSJ: 1743kcals x 1.1 - 1.2 = 1917 - 2092kcals/day  RMR per PSU (Tmax in 24hrs = 37.9; VE = 6.5): 1991kcals/day    Total Calories / day: 1800 - 2100  (Calories / k - 26)  Total Grams Protein / day: 98 - 122  (Grams Protein / k.2 - 1.5)     Objective:  Per H&P: Pt complained of ongoing headache for about 1 day. Took a nap and woke up and was significantly altered.   Problem list  Principal Problem:    Streptococcus pneumoniae meningitis (POA: Yes)  Active Problems:    On mechanically assisted ventilation (HCC) (POA: No)    Sepsis due to Streptococcus pneumoniae with encephalopathy without septic shock (HCC) (POA: Unknown)    Pertinent medical hx: No past medical history on file.  Nutrition support: Indicated due to pt intubated, vented and sedated. Pt unable to pt on PO diet at this time.   Enteral access: OGT  Pertinent labs : glu 165, magnesium 2.6,   Pertinent meds: Decadron, propofol 30mcg ( =314kcals / 24hrs), SSI, D50, BM protocol  Skin/wounds: No documented wounds, no edema   Food Allergies: NKA  Last BM: 24, per flowsheets  Fiber containing formula indicated since pt not on pressors.      Current diet order:   NPO w/ TF    Subjective:     Nutrition Focused Physical Exam (NFPE)  Weight change: Per chart review, pt w/ no wt loss.   Muscle mass: Limited information d/t pt intubated/vented/sedated.  Subcutaneous fat: Limited information d/t pt " intubated/vented/sedated.  Fluid Accumulation: NA  Reduced  Strength: N/A in acute care setting.     Nutrition Diagnosis:      Inadequate oral intake related to pt vented/sedated/intubated as evidenced by pt need for nutrition support.      Nutrition Interventions:      Initiate TF promote w/ fiber at 25 mL/hr and advance per protocol to goal rate of 80ml/hr which will provide 1920kcals (+ kcals from propofol), 120g protein, and 1596ml free water in 24 hrs  Additional fluids per MD/DO.  Patient aware of active plan of care as appropriate.     Nutrition Monitoring and Evaluation:     Monitor nutrition POC.  Monitor weight trends.      RD following and will provide updated recommendations as indicated.

## 2024-09-14 NOTE — ASSESSMENT & PLAN NOTE
-Patient complained of back pain prior to presentation  -MRI showed disc herniation L5-S1  -Multimodal pain management  -Outpatient neurosurgery follow-up

## 2024-09-14 NOTE — CARE PLAN
The patient is Watcher - Medium risk of patient condition declining or worsening    Shift Goals  Clinical Goals: stable or improving neuro, antibiotics per ID, RASS 0 to -1  Patient Goals: lesly  Family Goals: MRI results, neuro improvement    Progress made toward(s) clinical / shift goals:  SAT completed with improved neuro status. RASS -1 to 0. MRI results discussed with wife at bedside by JUDY Jc.       Problem: Pain - Standard  Goal: Alleviation of pain or a reduction in pain to the patient’s comfort goal  Outcome: Progressing     Problem: Safety - Medical Restraint  Goal: Remains free of injury from restraints (Restraint for Interference with Medical Device)  Outcome: Progressing  Goal: Free from restraint(s) (Restraint for Interference with Medical Device)  Outcome: Progressing

## 2024-09-14 NOTE — PROGRESS NOTES
At approximately 1600 writer notified Tamar KIM of pt's HR trending down throughout the day. Per Tamar, if pt's BP remains stable we will continue to monitor the HR.    At 1600, MRI pump programmed per MAR orders for Propofol and Fentanyl and double verified with Imelda GILLIAM. Propofol infusing at 40 mcg/kg/min due to agitation with transferring to [facility].

## 2024-09-15 PROBLEM — R00.1 BRADYCARDIA: Status: ACTIVE | Noted: 2024-09-15

## 2024-09-15 LAB
ALBUMIN SERPL BCP-MCNC: 2.7 G/DL (ref 3.2–4.9)
ALBUMIN/GLOB SERPL: 1.3 G/DL
ALP SERPL-CCNC: 31 U/L (ref 30–99)
ALT SERPL-CCNC: 14 U/L (ref 2–50)
ANION GAP SERPL CALC-SCNC: 10 MMOL/L (ref 7–16)
AST SERPL-CCNC: 13 U/L (ref 12–45)
BILIRUB SERPL-MCNC: 0.6 MG/DL (ref 0.1–1.5)
BUN SERPL-MCNC: 15 MG/DL (ref 8–22)
CALCIUM ALBUM COR SERPL-MCNC: 8.9 MG/DL (ref 8.5–10.5)
CALCIUM SERPL-MCNC: 7.9 MG/DL (ref 8.5–10.5)
CHLORIDE SERPL-SCNC: 108 MMOL/L (ref 96–112)
CO2 SERPL-SCNC: 24 MMOL/L (ref 20–33)
CREAT SERPL-MCNC: 0.85 MG/DL (ref 0.5–1.4)
CRP SERPL HS-MCNC: 8.26 MG/DL (ref 0–0.75)
EKG IMPRESSION: NORMAL
ERYTHROCYTE [DISTWIDTH] IN BLOOD BY AUTOMATED COUNT: 43.1 FL (ref 35.9–50)
GFR SERPLBLD CREATININE-BSD FMLA CKD-EPI: 116 ML/MIN/1.73 M 2
GLOBULIN SER CALC-MCNC: 2.1 G/DL (ref 1.9–3.5)
GLUCOSE BLD STRIP.AUTO-MCNC: 142 MG/DL (ref 65–99)
GLUCOSE BLD STRIP.AUTO-MCNC: 156 MG/DL (ref 65–99)
GLUCOSE SERPL-MCNC: 147 MG/DL (ref 65–99)
HAV IGM SERPL QL IA: NORMAL
HBV CORE IGM SER QL: NORMAL
HBV SURFACE AG SER QL: NORMAL
HCT VFR BLD AUTO: 33.6 % (ref 42–52)
HCV AB SER QL: NORMAL
HGB BLD-MCNC: 10.9 G/DL (ref 14–18)
HIV 1+2 AB+HIV1 P24 AG SERPL QL IA: NORMAL
MAGNESIUM SERPL-MCNC: 2 MG/DL (ref 1.5–2.5)
MCH RBC QN AUTO: 29.3 PG (ref 27–33)
MCHC RBC AUTO-ENTMCNC: 32.4 G/DL (ref 32.3–36.5)
MCV RBC AUTO: 90.3 FL (ref 81.4–97.8)
PHOSPHATE SERPL-MCNC: 2.2 MG/DL (ref 2.5–4.5)
PLATELET # BLD AUTO: 156 K/UL (ref 164–446)
PMV BLD AUTO: 10.1 FL (ref 9–12.9)
POTASSIUM SERPL-SCNC: 3.8 MMOL/L (ref 3.6–5.5)
PREALB SERPL-MCNC: 10.5 MG/DL (ref 18–38)
PROT SERPL-MCNC: 4.8 G/DL (ref 6–8.2)
RBC # BLD AUTO: 3.72 M/UL (ref 4.7–6.1)
SODIUM SERPL-SCNC: 142 MMOL/L (ref 135–145)
VANCOMYCIN TROUGH SERPL-MCNC: 5.9 UG/ML (ref 10–20)
WBC # BLD AUTO: 8.6 K/UL (ref 4.8–10.8)

## 2024-09-15 PROCEDURE — 700101 HCHG RX REV CODE 250: Performed by: STUDENT IN AN ORGANIZED HEALTH CARE EDUCATION/TRAINING PROGRAM

## 2024-09-15 PROCEDURE — 700105 HCHG RX REV CODE 258: Performed by: INTERNAL MEDICINE

## 2024-09-15 PROCEDURE — 80074 ACUTE HEPATITIS PANEL: CPT

## 2024-09-15 PROCEDURE — 82962 GLUCOSE BLOOD TEST: CPT

## 2024-09-15 PROCEDURE — 700111 HCHG RX REV CODE 636 W/ 250 OVERRIDE (IP): Performed by: INTERNAL MEDICINE

## 2024-09-15 PROCEDURE — 87389 HIV-1 AG W/HIV-1&-2 AB AG IA: CPT

## 2024-09-15 PROCEDURE — 80202 ASSAY OF VANCOMYCIN: CPT

## 2024-09-15 PROCEDURE — 92526 ORAL FUNCTION THERAPY: CPT

## 2024-09-15 PROCEDURE — 700101 HCHG RX REV CODE 250: Performed by: NURSE PRACTITIONER

## 2024-09-15 PROCEDURE — 99222 1ST HOSP IP/OBS MODERATE 55: CPT | Performed by: HOSPITALIST

## 2024-09-15 PROCEDURE — 84134 ASSAY OF PREALBUMIN: CPT

## 2024-09-15 PROCEDURE — 84100 ASSAY OF PHOSPHORUS: CPT

## 2024-09-15 PROCEDURE — 85027 COMPLETE CBC AUTOMATED: CPT

## 2024-09-15 PROCEDURE — 80053 COMPREHEN METABOLIC PANEL: CPT

## 2024-09-15 PROCEDURE — 700105 HCHG RX REV CODE 258: Performed by: NURSE PRACTITIONER

## 2024-09-15 PROCEDURE — 51798 US URINE CAPACITY MEASURE: CPT

## 2024-09-15 PROCEDURE — 93005 ELECTROCARDIOGRAM TRACING: CPT | Performed by: HOSPITALIST

## 2024-09-15 PROCEDURE — 770020 HCHG ROOM/CARE - TELE (206)

## 2024-09-15 PROCEDURE — 83735 ASSAY OF MAGNESIUM: CPT

## 2024-09-15 PROCEDURE — 86140 C-REACTIVE PROTEIN: CPT

## 2024-09-15 PROCEDURE — 99233 SBSQ HOSP IP/OBS HIGH 50: CPT | Performed by: NURSE PRACTITIONER

## 2024-09-15 PROCEDURE — 700111 HCHG RX REV CODE 636 W/ 250 OVERRIDE (IP): Performed by: STUDENT IN AN ORGANIZED HEALTH CARE EDUCATION/TRAINING PROGRAM

## 2024-09-15 RX ORDER — ACETAMINOPHEN 325 MG/1
650 TABLET ORAL EVERY 6 HOURS PRN
Status: DISCONTINUED | OUTPATIENT
Start: 2024-09-15 | End: 2024-09-19 | Stop reason: HOSPADM

## 2024-09-15 RX ORDER — AMOXICILLIN 250 MG
2 CAPSULE ORAL 2 TIMES DAILY
Status: DISCONTINUED | OUTPATIENT
Start: 2024-09-15 | End: 2024-09-19 | Stop reason: HOSPADM

## 2024-09-15 RX ORDER — POLYETHYLENE GLYCOL 3350 17 G/17G
1 POWDER, FOR SOLUTION ORAL
Status: DISCONTINUED | OUTPATIENT
Start: 2024-09-15 | End: 2024-09-19 | Stop reason: HOSPADM

## 2024-09-15 RX ADMIN — INSULIN HUMAN 2 UNITS: 100 INJECTION, SOLUTION PARENTERAL at 05:22

## 2024-09-15 RX ADMIN — SODIUM CHLORIDE 4 MILLION UNITS: 9 INJECTION, SOLUTION INTRAVENOUS at 18:01

## 2024-09-15 RX ADMIN — SODIUM CHLORIDE 4 MILLION UNITS: 9 INJECTION, SOLUTION INTRAVENOUS at 10:37

## 2024-09-15 RX ADMIN — CEFTRIAXONE SODIUM 2000 MG: 10 INJECTION, POWDER, FOR SOLUTION INTRAVENOUS at 05:25

## 2024-09-15 RX ADMIN — DEXAMETHASONE SODIUM PHOSPHATE 10 MG: 4 INJECTION INTRA-ARTICULAR; INTRALESIONAL; INTRAMUSCULAR; INTRAVENOUS; SOFT TISSUE at 20:25

## 2024-09-15 RX ADMIN — DEXAMETHASONE SODIUM PHOSPHATE 10 MG: 4 INJECTION INTRA-ARTICULAR; INTRALESIONAL; INTRAMUSCULAR; INTRAVENOUS; SOFT TISSUE at 01:54

## 2024-09-15 RX ADMIN — DEXAMETHASONE SODIUM PHOSPHATE 10 MG: 4 INJECTION INTRA-ARTICULAR; INTRALESIONAL; INTRAMUSCULAR; INTRAVENOUS; SOFT TISSUE at 09:13

## 2024-09-15 RX ADMIN — POTASSIUM PHOSPHATE, MONOBASIC AND POTASSIUM PHOSPHATE, DIBASIC 15 MMOL: 224; 236 INJECTION, SOLUTION, CONCENTRATE INTRAVENOUS at 09:22

## 2024-09-15 RX ADMIN — SODIUM CHLORIDE, POTASSIUM CHLORIDE, SODIUM LACTATE AND CALCIUM CHLORIDE: 600; 310; 30; 20 INJECTION, SOLUTION INTRAVENOUS at 01:52

## 2024-09-15 RX ADMIN — SODIUM CHLORIDE 4 MILLION UNITS: 9 INJECTION, SOLUTION INTRAVENOUS at 21:15

## 2024-09-15 RX ADMIN — ENOXAPARIN SODIUM 40 MG: 100 INJECTION SUBCUTANEOUS at 18:01

## 2024-09-15 RX ADMIN — DEXAMETHASONE SODIUM PHOSPHATE 10 MG: 4 INJECTION INTRA-ARTICULAR; INTRALESIONAL; INTRAMUSCULAR; INTRAVENOUS; SOFT TISSUE at 14:06

## 2024-09-15 RX ADMIN — SODIUM CHLORIDE 4 MILLION UNITS: 9 INJECTION, SOLUTION INTRAVENOUS at 14:08

## 2024-09-15 ASSESSMENT — ENCOUNTER SYMPTOMS
SHORTNESS OF BREATH: 0
CARDIOVASCULAR NEGATIVE: 1
COUGH: 0
MUSCULOSKELETAL NEGATIVE: 1
GASTROINTESTINAL NEGATIVE: 1
BACK PAIN: 1
NECK PAIN: 1
NAUSEA: 0
NERVOUS/ANXIOUS: 1
DIZZINESS: 0
HEADACHES: 1
EYES NEGATIVE: 1
DIZZINESS: 1
RESPIRATORY NEGATIVE: 1

## 2024-09-15 ASSESSMENT — PAIN DESCRIPTION - PAIN TYPE
TYPE: ACUTE PAIN

## 2024-09-15 ASSESSMENT — FIBROSIS 4 INDEX: FIB4 SCORE: 1.06

## 2024-09-15 NOTE — CARE PLAN
The patient is Stable - Low risk of patient condition declining or worsening    Shift Goals  Clinical Goals: Stable neuro, ABX per orders, SBP < 160, RASS 0 to -1  Patient Goals: Unable to assess  Family Goals: Neuro improvement, updates    Progress made toward(s) clinical / shift goals:    Problem: Knowledge Deficit - Standard  Goal: Patient and family/care givers will demonstrate understanding of plan of care, disease process/condition, diagnostic tests and medications  Outcome: Progressing     Problem: Hemodynamics  Goal: Patient's hemodynamics, fluid balance and neurologic status will be stable or improve  Outcome: Progressing     Problem: Fluid Volume  Goal: Fluid volume balance will be maintained  Outcome: Progressing     Problem: Urinary - Renal Perfusion  Goal: Ability to achieve and maintain adequate renal perfusion and functioning will improve  Outcome: Progressing     Problem: Respiratory  Goal: Patient will achieve/maintain optimum respiratory ventilation and gas exchange  Outcome: Progressing     Problem: Physical Regulation  Goal: Diagnostic test results will improve  Outcome: Progressing  Goal: Signs and symptoms of infection will decrease  Outcome: Progressing     Problem: Pain - Standard  Goal: Alleviation of pain or a reduction in pain to the patient’s comfort goal  Outcome: Progressing     Problem: Skin Integrity  Goal: Skin integrity is maintained or improved  Outcome: Progressing     Problem: Fall Risk  Goal: Patient will remain free from falls  Outcome: Progressing       Patient is not progressing towards the following goals:

## 2024-09-15 NOTE — CARE PLAN
Problem: Ventilation  Goal: Ability to achieve and maintain unassisted ventilation or tolerate decreased levels of ventilator support  Description: Target End Date:  4 days     Document on Vent flowsheet    1.  Support and monitor invasive and noninvasive mechanical ventilation  2.  Monitor ventilator weaning response  3.  Perform ventilator associated pneumonia prevention interventions  4.  Manage ventilation therapy by monitoring diagnostic test results  Outcome: Met       Respiratory Update    Extubated, tolerated well

## 2024-09-15 NOTE — PROGRESS NOTES
Infectious Disease Progress Note    Author: Carol Rousseau M.D. Date & Time of service: 9/15/2024  8:16 AM    Chief Complaint:  Streptococcus pneumoniae meningitis      Interval History:   AF, O2 Vent 8/30%, minimal vent settings, no pressors.     Review of Systems:  Review of Systems   Unable to perform ROS: Mental status change       Hemodynamics:  No data recorded.  Monitored Temp: 37.6 °C (99.7 °F)  Pulse  Av.2  Min: 20  Max: 117   Blood Pressure: 136/86       Physical Exam:  Physical Exam  Cardiovascular:      Rate and Rhythm: Normal rate and regular rhythm.      Heart sounds: Normal heart sounds.   Pulmonary:      Effort: Pulmonary effort is normal.      Breath sounds: Normal breath sounds.   Abdominal:      General: Abdomen is flat. Bowel sounds are normal.      Palpations: Abdomen is soft.   Musculoskeletal:         General: Normal range of motion.      Cervical back: Normal range of motion and neck supple. No rigidity or tenderness.   Skin:     General: Skin is warm and dry.   Neurological:      Comments: Alert and following simple commands, still some generalized confusion   Psychiatric:         Mood and Affect: Mood normal.         Behavior: Behavior normal.         Meds:    Current Facility-Administered Medications:     Pharmacy    acetaminophen (TYLENOL) suppository    cefTRIAXone (ROCEPHIN) IV    Respiratory Therapy Consult    acetaminophen    senna-docusate **AND** polyethylene glycol/lytes    hydrALAZINE    labetalol    MD Alert...Adult ICU Electrolyte Replacement per Pharmacy    dexamethasone    insulin regular **AND** POC blood glucose manual result **AND** NOTIFY MD and PharmD **AND** Administer 20 grams of glucose (approximately 8 ounces of fruit juice) every 15 minutes PRN FSBG less than 70 mg/dL **AND** dextrose bolus    ondansetron    enoxaparin (LOVENOX) injection    Labs:  Recent Labs     24  1803 24  0805 24  0628 09/15/24  0710   WBC 8.8 9.4 10.6 8.6   RBC  4.97 4.52* 4.38* 3.72*   HEMOGLOBIN 14.6 13.4* 12.8* 10.9*   HEMATOCRIT 43.6 39.0* 39.4* 33.6*   MCV 87.7 86.3 90.0 90.3   MCH 29.4 29.6 29.2 29.3   RDW 39.7 40.3 43.8 43.1   PLATELETCT 178 140* 141* 156*   MPV 9.0 9.4 10.2 10.1   NEUTSPOLYS 93.10*  --   --   --    LYMPHOCYTES 3.10*  --   --   --    MONOCYTES 3.30  --   --   --    EOSINOPHILS 0.00  --   --   --    BASOPHILS 0.20  --   --   --      Recent Labs     09/13/24  0805 09/14/24  0628 09/15/24  0710   SODIUM 137 143 142   POTASSIUM 4.4 4.6 3.8   CHLORIDE 104 110 108   CO2 20 20 24   GLUCOSE 165* 165* 147*   BUN 9 12 15     Recent Labs     09/13/24  0805 09/14/24  0628 09/15/24  0710   ALBUMIN 3.9 3.3 2.7*   TBILIRUBIN 1.0 0.8 0.6   ALKPHOSPHAT 32 35 31   TOTPROTEIN 6.4 6.0 4.8*   ALTSGPT 15 14 14   ASTSGOT 18 16 13   CREATININE 0.84 0.68 0.85       Imaging:  MR-LUMBAR SPINE-WITH & W/O    Result Date: 9/13/2024 9/13/2024 4:53 PM HISTORY/REASON FOR EXAM:  Back pain with known meningitis. TECHNIQUE/EXAM DESCRIPTION: MRI of the lumbar spine without and with contrast. The study was performed on a [employer] 1.5 Blanca MRI scanner. T1 sagittal, T2 fast spin-echo sagittal, and T2 axial images were obtained of the lumbar spine. T1 post-contrast fat-suppressed sagittal images were obtained. Optional T2 fat-suppressed sagittal and T1 post-contrast axial images may be obtained. 15 mL ProHance gadolinium contrast was administered intravenously. COMPARISON:  MRI thoracic spine from today's date FINDINGS: Alignment in the lumbar spine is normal. Marrow signal in the vertebral bodies is normal. There is no abnormal osseous enhancement. There is no evidence of discitis or osteomyelitis. There is no evidence of epidural phlegmon or epidural abscess. The conus is normal in position and signal with its tip at the L2 level. There is no abnormal cord enhancement. There is no abnormal intradural extra medullary enhancement. There is no abnormal enhancement of nerve roots of the  cauda equina. However, there is T2 hypointense fluid-fluid level in the dependent caudal thecal sac. In keeping with history of meningitis, this may represent intradural exudate. Dependently layering subarachnoid hemorrhage within the thecal sac could have a similar appearance. (2 sagittal image 8, series 17, T2 fat-suppressed sagittal image 8, series 20, T2 axial image 4, series 22). At T12-L1, no abnormality. At L1-2, no abnormality. At L2-3, no abnormality. At L3-4, no abnormality. At L4-5, there is a tiny midline disc bulge with an underlying tiny annular fissure (T2 axial image 13, series 21). No central or foraminal stenosis. At L5-S1, there is a large left paramedian disc protrusion/extrusion. There is prominent impingement on the left ventral thecal sac. There is marked left lateral recess stenosis with posterior displacement of the emerging S1 nerve root (T2 axial 9, series 21). There is mild hypertrophic facet arthropathy. There is mild right foraminal stenosis and moderate left stenosis.     1.  T2 hypointense fluid-fluid level in the dependent caudal thecal sac in keeping with the history of meningitis. This likely represents intradural exudate in the setting of meningitis. Dependently layering subarachnoid hemorrhage within the thecal sac could have a similar appearance. 2.  L4-5 tiny midline disc bulge with underlying tiny annular fissure. No central or foraminal stenosis. 3.  L5-S1 large left paramedian disc protrusion/extrusion with marked compromise of the emerging left S1 nerve root. Mild right foraminal stenosis. Moderate left foraminal stenosis.    MR-THORACIC SPINE-WITH & W/O    Result Date: 9/13/2024 9/13/2024 4:52 PM HISTORY/REASON FOR EXAM:  Back pain with known meningitis. TECHNIQUE/EXAM DESCRIPTION: MRI of the thoracic spine without and with contrast. The study was performed on a [employer] 1.5 Blanca MRI scanner. T1 sagittal, T2 fast spin-echo sagittal, and T2 axial images were obtained of  the thoracic spine. T1 post-contrast fat suppressed sagittal images were obtained. Optional T1 post-contrast axial images may be obtained. 15 mL ProHance gadolinium contrast was administered intravenously. COMPARISON:  MRI lumbar spine from today's date FINDINGS: Alignment in the thoracic spine is normal. Marrow signal in the vertebral bodies is normal. There is no abnormal osseous enhancement. There is no evidence of discitis or osteomyelitis. The prevertebral and paraspinous soft tissues are unremarkable except for bibasilar atelectatic infiltrates and tiny bilateral pleural effusions. The thoracic spinal cord is normal in caliber and signal throughout its course. There is no abnormal cord enhancement. There is no abnormal intradural extra medullary enhancement. There is no significant disc bulge or protrusion, central stenosis, or foraminal stenosis at any thoracic level.     MRI OF THE THORACIC SPINE WITHOUT AND WITH CONTRAST WITHIN NORMAL LIMITS.    MR-CERVICAL SPINE-WITH & W/O    Result Date: 9/13/2024 9/13/2024 4:52 PM HISTORY/REASON FOR EXAM:  Back pain with known meningitis. TECHNIQUE/EXAM DESCRIPTION: MRI of the cervical spine without and with contrast. The study was performed on a [employer] 1.5 Blanca MRI scanner. T1 sagittal, T2 fast spin-echo sagittal, and gradient echo axial images were obtained of the cervical spine. Optional T2 fat-suppressed sagittal images may also be obtained. T1 post-contrast fat suppressed sagittal images were obtained of the cervical spine. Optional T1 post-contrast axial images may be obtained. 15 mL ProHance gadolinium contrast was administered intravenously. COMPARISON: CT cervical spine outside films 9/12/2024 FINDINGS: Alignment in the cervical spine shows no segmental subluxation. Marrow signal in the vertebral bodies is normal. There is no abnormal osseous enhancement. The prevertebral soft tissues are unremarkable except for some secretions in the pharynx with  endotracheal tube and enteric tube in situ. There are no anomalies at the craniovertebral junction.  The cervical spinal cord is normal in caliber and signal throughout its course. There is no abnormal cord enhancement. There is no abnormal intradural extra medullary enhancement. At C2-3, no abnormality. At C3-4, no abnormality. At C4-5, no abnormality. At C5-6, minimal disc-osteophyte complex. Some thinning of ventral subarachnoid space. Dorsal subarachnoid space remains intact. There is no central stenosis. The neural foramina are intact. At C6-7, no significant disc bulge or protrusion. No central stenosis. The left neural foramen is intact. There is moderate-marked right foraminal stenosis due to spondylotic change. At C7-T1, no abnormality.     1.  C5-6 minimal midline disc/osteophyte complex. No central or foraminal stenosis. 2.  C6-C7 moderate-marked right foraminal stenosis due to spondylotic change. 3.  No myelopathic cord signal. Concerning the history of meningitis, no abnormal intradural extra medullary enhancement.    MR-VENOGRAM (MRV) HEAD    Result Date: 9/13/2024 9/12/2024 8:35 PM HISTORY/REASON FOR EXAM:  ALTERED LEVEL OF CONSCIOUSNESS. TECHNIQUE/EXAM DESCRIPTION: MR venogram of the cerebral veins-time-of-flight sequences COMPARISON:  None FINDINGS: The superior sagittal sinus is widely patent. There is widely patent dominant RIGHT transverse and sigmoid sinuses. There is hypoplastic LEFT transverse and sigmoid sinuses.     Unremarkable MR venogram of the cerebral veins.    MR-BRAIN-WITH & W/O    Result Date: 9/13/2024 9/12/2024 8:35 PM HISTORY/REASON FOR EXAM:  ALTERED LEVEL OF CONSCIOUSNESS. TECHNIQUE/EXAM DESCRIPTION: MR brain with and without contrast. Multiplanar multisequence MR examination of the brain with and without contrast done on 1.5 MRI scanner. 15 mL ProHance contrast was administered intravenously. COMPARISON:  None. FINDINGS: The FLAIR images demonstrates diffuse hyperintense  signal within the subarachnoid spaces. There is also minimal abnormal ependymal T2 hyperintensity. There is no hydrocephalus. There is no abnormal contrast enhancement. There is no restricted diffusion, acute hemorrhage, hydrocephalus, intracranial space-occupying lesion, abnormal volume loss, vasogenic edema or mass effect. There is mild tonsillar ectopia. The gray matter structures are unremarkable. The supra and infratentorial white matter is unremarkable.  There is no subdural or epidural space-occupying process. The visualized flow voids of the cerebral vasculature are unremarkable.  There is no large lesion identified in the expected course of the intracranial portions of the cranial nerves. The visualized bones, muscles, adipose tissue and the glands are unremarkable. There is mucosal thickening in the paranasal sinuses.     1.  The FLAIR images demonstrates diffuse hyperintense signal within the subarachnoid spaces. There is also minimal abnormal ependymal T2 hyperintensity. There is no hydrocephalus. There is no abnormal contrast enhancement. This finding is concerning for  acute Meningitis/meningeal inflammation. Please correlate with the CSF study. 2.  Mild tonsillar ectopia.    DX-CHEST-PORTABLE (1 VIEW)    Result Date: 9/12/2024 9/12/2024 6:36 PM HISTORY/REASON FOR EXAM:  Shortness of Breath TECHNIQUE/EXAM DESCRIPTION AND NUMBER OF VIEWS: Single portable view of the chest. COMPARISON: None FINDINGS: Tracheal tube tip is below the level of the clavicles. NG tube is in the proximal stomach. Heart size is within normal limits. No focal infiltrates or consolidations are identified in the lungs. No pleural fluid collections are identified. No pneumothorax is appreciated.     No acute cardiac or pulmonary abnormality is identified.      Micro:  Results       Procedure Component Value Units Date/Time    GRAM STAIN [[id number]]  (Abnormal) Collected: 09/12/24 1946    Order Status: Completed Specimen: CSF  Updated: 09/14/24 1358     Significant Indicator .     Source CSF     Site TAP     Gram Stain Result Many WBCs.  Moderate Gram positive cocci.      Narrative:      [facility] tel. [phone] 09/13/2024, 07:13, RB PERF. RESULTS CALLED TO:[id number]  ER tel.  09/12/2024, 20:50, RB PERF. RESULTS CALLED TO: Hallie GILLIAM [phone]    E-Test [[id number]] Collected: 09/12/24 1946    Order Status: Completed Specimen: Other Updated: 09/14/24 1358     ETEST Sensitivity FINAL    Narrative:      [facility] tel. [phone] 09/13/2024, 07:13, RB PERF. RESULTS CALLED TO:[id number]  ER tel.  09/12/2024, 20:50, RB PERF. RESULTS CALLED TO: Hallie Mcwilliams    CSF CULTURE [[id number]]  (Abnormal)  (Susceptibility) Collected: 09/12/24 1946    Order Status: Completed Specimen: CSF from Tap Updated: 09/14/24 1358     Significant Indicator POS     Source CSF     Site TAP     Culture Result -     Gram Stain Result Many WBCs.  Moderate Gram positive cocci.       Culture Result Streptococcus pneumoniae  Moderate growth      Narrative:      [facility] tel. [phone] 09/13/2024, 07:13, RB PERF. RESULTS CALLED TO:[id number]  ER tel.  09/12/2024, 20:50, RB PERF. RESULTS CALLED TO: Hallie GILLIAM [phone]    Susceptibility       Streptococcus pneumoniae (1)       Antibiotic Interpretation Microscan   Method Status    Penicillin-meningitis Sensitive 0.032 mcg/mL E-TEST Final    Penicillin non-meningitis Sensitive 0.032 mcg/mL E-TEST Final    Cefotaxime-meningitis Sensitive 0.012 mcg/mL E-TEST Final    Cefotaxime-non meningitis Sensitive 0.012 mcg/mL E-TEST Final    Erythromycin Sensitive 0.064 mcg/mL E-TEST Final    Levofloxacin Sensitive 1.0 mcg/mL E-TEST Final                       MRSA By PCR (Amp) [[id number]] Collected: 09/12/24 2205    Order Status: Completed Specimen: Respirate from Nares Updated: 09/13/24 1230     MRSA by PCR Negative    BLOOD CULTURE [[id number]] Collected: 09/12/24 2220    Order Status: Completed Specimen: Blood from Peripheral Updated: 09/13/24 0008     Significant  Indicator NEG     Source BLD     Site PERIPHERAL     Culture Result No Growth  Note: Blood cultures are incubated for 5 days and  are monitored continuously.Positive blood cultures  are called to the RN and reported as soon as  they are identified.      BLOOD CULTURE [[id number]] Collected: 09/12/24 2205    Order Status: Completed Specimen: Blood from Peripheral Updated: 09/13/24 0008     Significant Indicator NEG     Source BLD     Site PERIPHERAL     Culture Result No Growth  Note: Blood cultures are incubated for 5 days and  are monitored continuously.Positive blood cultures  are called to the RN and reported as soon as  they are identified.      URINALYSIS [[id number]]  (Abnormal) Collected: 09/12/24 1843    Order Status: Completed Specimen: Urine Updated: 09/12/24 1918     Color Orange     Character Clear     Specific Gravity >=1.045     Ph 5.0     Glucose 500 mg/dL      Ketones 80 mg/dL      Protein Negative mg/dL      Bilirubin Negative     Urobilinogen, Urine 0.2     Nitrite Negative     Leukocyte Esterase Negative     Occult Blood Moderate     Micro Urine Req Microscopic    URINALYSIS [[id number]]     Order Status: Canceled Specimen: Urine             Assessment:  Active Hospital Problems    Diagnosis     *Streptococcus pneumoniae meningitis [G00.1]     Acute encephalopathy [G93.40]     Lumbar disc herniation [M51.26]     On mechanically assisted ventilation (HCC) [Z99.11]     Sepsis due to Streptococcus pneumoniae with encephalopathy without septic shock (HCC) [A40.3, R65.20, G93.41]      Interval 24 hours:      AF, O2 RA  Labs reviewed to assess for clinical status, drug toxicity and organ function  Micro reviewed    Patient sitting up in bed, still somewhat confused but is following simple commands.  Denies any headache or neck pain with movement.      Assessment:  Kwesi Rivera is a 34 y.o. man with no prior medical history admitted from [facility] secondary to acute encephalopathy and headache.   Lumbar puncture on 9/12 consistent with bacterial meningitis with both CSF cultures and meningitis/encephalitis PCR panel positive for Streptococcus pneumoniae.  Blood cultures on admission are negative to date.      Streptococcus pneumoniae meningitis       Ventilator dependent respiratory failure, extubated on 9/14  Acute encephalopathy, secondary to above  Back pain, prior to admission, concern for infection and lumbar spine  -MRI lumbar spine with fluid in the T2 caudal thecal sac, reported as consistent with meningitis likely represents intradural exudate  -MRI cervical thoracic spine with no obvious infectious finding  Bradycardia      Plan:  - Stop vancomycin and ceftriaxone as based on sensitivities and will transition to penicillin given high sensitivity to penicillin and good CNS penetration of this antibiotic - started penicillin G 4,000,000 units every 4 hours   --- Anticipate 14-day antibiotic course, can potentially use continuous infusion penicillin on discharge, end 9/26  -Monitor renal function and Vanco trough  -CSF cultures +Streptococcus pneumoniae, sensitive to cephalosporins, sensitive to levofloxacin, penicillin meningitis MATTHEW 0.032, highly sensitive  -Continue dexamethasone for 4 days  -Continue to monitor neurologic status        This infection poses a threat to life and further neurologic function     Disposition: TBD     Need for PICC line: Midline ordered     Plan of care discussed with ICU APRN, Tamar Jc and  ICU nurse. Will continue to follow.

## 2024-09-15 NOTE — THERAPY
Speech Language Pathology   Daily Treatment     Patient Name: Kwesi Rivera  AGE:  34 y.o., SEX:  male  Medical Record #: [id number]  Date of Service: 9/15/2024      Precautions:  Precautions: Fall Risk     Subjective  RN cleared pt for swallow re-evaluation, thinks he will do well. Patient was sleeping on arrival but wakened easily and was agreeable to participating. He reports no difficulty swallowing.     Assessment  Patient was flat throughout the evaluation, provided one word responses to questions, but was cooperative and participated fully, maintaining alertness throughout the session. He was able to self-feed with some difficulty grasping utensils but managed well enough. He consumed ice chips, thin water via tsp/cup/straw single and serial sips, pudding via tsp and cracker. Pt demonstrated intact labial seal without anterior bolus escape, timely and efficient mastication without significant oral residue, presumed intact AP lingual transit. Pharyngeal swallow response appeared timely. Multiple swallows followed by belching noted w/ first tsp of thin liquids, but no further signs of pharyngeal inefficiency appreciated with serial cup/straw sips or solids. No throat clearing, coughing or changes in vocal quality appreciated with PO intake. Pt denied globus sensation, odynophagia and/or bolus misdirection.       Clinical Impressions  Patient presents with a functional oropharyngeal swallow today and improved ability to maintain alertness. Diet modification is not indicated. Pt should follow standard safe swallow precautions with PO intake (upright positioning, small bites/sips, slow rate, ensure adequate arousal). SLP will not actively follow for dysphagia tx but will f/u to complete a formal cognitive-linguistic evaluation.     Recommendations  Treatment Completed: Dysphagia Treatment    Dysphagia Treatment  Diet Consistency: Regular solids, Thin liquids  Instrumentation: None indicated at this time  Medication:  "Whole with liquid  Supervision: Independent  Positioning: Fully upright and midline during oral intake, Meals sitting upright in a chair, as tolerated  Risk Management : Small bites/sips, Slow rate of intake, Physical mobility, as tolerated  Oral Care: BID    SLP Treatment Plan  Treatment Plan: None Indicated  SLP Frequency: N/A - Evaluation Only  Estimated Duration: N/A - Evaluation Only    Anticipated Discharge Needs  Discharge Recommendations: Other (no dysphagia tx anticipated; further recs pending cog eval)  Therapy Recommendations Upon DC: Other (See Comments) (TBD pending cog eval)    Patient / Family Goals  Patient / Family Goal #1: Updated 9/15: \"that would be great.\" (water)  Short Term Goals  Short Term Goal # 1: Pt will participate in prefeeding trials with SLP without overt s/sx of aspiration  Goal Outcome # 1: Goal met    Tita Alvarez MS,CCC-SLP  "

## 2024-09-15 NOTE — CARE PLAN
The patient is Stable - Low risk of patient condition declining or worsening    Shift Goals  Clinical Goals: Stable neuro, ABX per orders, SBP < 160, RASS 0 to -1  Patient Goals: Unable to assess  Family Goals: Neuro improvement, updates    Progress made toward(s) clinical / shift goals:  ABX per ID, appropriate behavior, BP stable. Family at bedside, neuro improved from exam 9/14

## 2024-09-15 NOTE — PROGRESS NOTES
"Critical Care Progress Note    Date of admission  9/12/2024    Chief Complaint  Altered Mental Status    Hospital Course  Kwesi Rivera is a 34 year old male with no significant PMH who is an active  who presented as a transfer from [facility] 9/12 with altered mental status. He presented to OSH with headaches, nonverbal, with GCS 9. No focal deficits and unable to follow commands. CT/CTA showed some diffuse edema bilateral hemispheres. He transferred to [facility] for further Neurological evaluation.   On arrival here he was intubated and underwent LP with \"milky\" CSF.   PCR showed Strep Pneumo with > 15,000 WBC and > 600 protein on CSF. He was admitted to ICU and started on Vanco, Ceftriaxone, and steroids.  Brain MRI without evidence of venous thrombosis.  9/13 - VD #2. ID consult - Continue IV vancomycin and IV ceftriaxone. Following commands.  9/14 - VD #3. Extubated    Interval Problem Update  Reviewed last 24 hour events:  Temp 99.7  SB 40's  -140's. No drips  RASS -1, no sedation  Neuro: opens eyes to voice, following commands, moving all 4-5/5. Disoriented to events.  Diet pending SLP evaluation  I/O: 3025/1075  ABX changed to PCN today per ID with end date 9/26  Mobility 3B    No longer requires ICU care.  Transfer to Neurology floor.  Discussed with Hospitalist, Dr Fuller, who will assume care.  Critical care service is available for any questions or concerns.    Review of Systems  Review of Systems   Unable to perform ROS: Mental acuity (Limited)   Respiratory:  Negative for cough and shortness of breath.    Cardiovascular:  Negative for chest pain.   Gastrointestinal:  Negative for nausea.   Musculoskeletal:  Positive for back pain and neck pain.   Neurological:  Positive for headaches. Negative for dizziness.        Vital Signs for last 24 hours   Temp:  [36.3 °C (97.3 °F)] 36.3 °C (97.3 °F)  Pulse:  [39-65] 42  Resp:  [10-22] 18  BP: (109-178)/(58-86) 136/73  SpO2:  [92 " %-100 %] 97 %    Hemodynamic parameters for last 24 hours       Respiratory Information for the last 24 hours  Vent Mode: Spont  PEEP/CPAP: 8  P Support: 5  MAP: 9.9    Physical Exam   Physical Exam  Vitals and nursing note reviewed.   Constitutional:       General: He is sleeping. He is not in acute distress.     Appearance: Normal appearance. He is ill-appearing. He is not toxic-appearing.   HENT:      Head: Normocephalic.      Nose: Nose normal.      Mouth/Throat:      Lips: Pink.      Mouth: Mucous membranes are moist.   Eyes:      Pupils: Pupils are equal, round, and reactive to light.   Cardiovascular:      Rate and Rhythm: Regular rhythm. Bradycardia present.      Pulses: Normal pulses.      Heart sounds: Normal heart sounds.   Pulmonary:      Effort: Pulmonary effort is normal.      Breath sounds: Normal breath sounds. No wheezing or rhonchi.   Abdominal:      General: Bowel sounds are normal.      Palpations: Abdomen is soft.      Tenderness: There is no abdominal tenderness.   Musculoskeletal:      Right lower leg: No edema.      Left lower leg: No edema.   Skin:     General: Skin is warm and dry.      Capillary Refill: Capillary refill takes less than 2 seconds.   Neurological:      Mental Status: He is easily aroused.      GCS: GCS eye subscore is 3. GCS verbal subscore is 5. GCS motor subscore is 6.      Motor: Weakness present.   Psychiatric:         Speech: Speech normal.         Behavior: Behavior normal.         Cognition and Memory: He exhibits impaired recent memory.         Judgment: Judgment normal.         Medications  Current Facility-Administered Medications   Medication Dose Route Frequency Provider Last Rate Last Admin    penicillin G potassium 4 Million Units in  mL IVPB  4 Million Units Intravenous Q4HRS Carol Rousseau M.D. 200 mL/hr at 09/15/24 1037 4 Million Units at 09/15/24 1037    potassium phosphate 15 mmol in dextrose 5% 250 mL ivpb  15 mmol Intravenous Once Tamar FLORES  GAIL JcRPromiseN. 62.5 mL/hr at 09/15/24 0922 15 mmol at 09/15/24 0922    Pharmacy Consult: Enteral tube insertion - review meds/change route/product selection  1 Each Other PHARMACY TO DOSE Jeremy Dowell M.D.        acetaminophen (Tylenol) suppository 650 mg  650 mg Rectal Q6HRS PRN Yeni Garcia        Respiratory Therapy Consult   Nebulization Continuous RT Mauri Bowser M.D.        acetaminophen (Tylenol) tablet 650 mg  650 mg Enteral Tube Q6HRS PRN Mauri Bowser M.D.   650 mg at 09/13/24 0458    senna-docusate (Pericolace Or Senokot S) 8.6-50 MG per tablet 2 Tablet  2 Tablet Enteral Tube BID Mauri Bowser M.D.   2 Tablet at 09/14/24 0544    And    polyethylene glycol/lytes (Miralax) Packet 1 Packet  1 Packet Enteral Tube QDAY PRN Mauri Bowser M.D.        hydrALAZINE (Apresoline) injection 10 mg  10 mg Intravenous Q4HRS PRN Mauri Bowser M.D.        labetalol (Normodyne/Trandate) injection 10 mg  10 mg Intravenous Q4HRS PRN Mauri Bowser M.D.        dexamethasone (Decadron) injection 10 mg  10 mg Intravenous Q6HRS Mauri Bowser M.D.   10 mg at 09/15/24 0913    ondansetron (Zofran) syringe/vial injection 4 mg  4 mg Intravenous Q6HRS PRN Yeni Garcia        enoxaparin (Lovenox) inj 40 mg  40 mg Subcutaneous DAILY AT 1800 Mauri Bowser M.D.   40 mg at 09/14/24 1721       Fluids    Intake/Output Summary (Last 24 hours) at 9/15/2024 1050  Last data filed at 9/15/2024 1000  Gross per 24 hour   Intake 2644.82 ml   Output 1125 ml   Net 1519.82 ml       Laboratory  Recent Labs     09/12/24  2203 09/13/24  0444 09/14/24  0411   ISTATAPH 7.294* 7.418 7.442   ISTATAPCO2 39.7* 30.1 33.0   ISTATAPO2 89* 84 90*   ISTATATCO2 20 20 24   [id number] 96 97 97   ISTATARTHCO3 19.3 19.4 22.5   ISTATARTBE -7* -4 -1   ISTATTEMP 37.5 C 100.8 F 98.6 F   ISTATFIO2 30 30 30   ISTATSPEC Arterial Arterial Arterial   ISTATAPHTC 7.287* 7.400 7.442   [id number] 92* 91* 90*         Recent Labs     09/13/24  0805 09/14/24  0628  "09/15/24  0710   SODIUM 137 143 142   POTASSIUM 4.4 4.6 3.8   CHLORIDE 104 110 108   CO2 20 20 24   BUN 9 12 15   CREATININE 0.84 0.68 0.85   MAGNESIUM 1.8 2.6* 2.0   PHOSPHORUS 2.6 3.0 2.2*   CALCIUM 8.4* 8.6 7.9*     Recent Labs     09/13/24  0805 09/14/24  0628 09/15/24  0710   ALTSGPT 15 14 14   ASTSGOT 18 16 13   ALKPHOSPHAT 32 35 31   TBILIRUBIN 1.0 0.8 0.6   PREALBUMIN  --   --  10.5*   GLUCOSE 165* 165* 147*     Recent Labs     09/12/24  1803 09/13/24  0805 09/14/24  0628 09/15/24  0710   WBC 8.8 9.4 10.6 8.6   NEUTSPOLYS 93.10*  --   --   --    LYMPHOCYTES 3.10*  --   --   --    MONOCYTES 3.30  --   --   --    EOSINOPHILS 0.00  --   --   --    BASOPHILS 0.20  --   --   --    ASTSGOT 21 18 16 13   ALTSGPT 16 15 14 14   ALKPHOSPHAT 39 32 35 31   TBILIRUBIN 1.2 1.0 0.8 0.6     Recent Labs     09/12/24  1803 09/13/24  0805 09/14/24  0628 09/15/24  0710   RBC 4.97 4.52* 4.38* 3.72*   HEMOGLOBIN 14.6 13.4* 12.8* 10.9*   HEMATOCRIT 43.6 39.0* 39.4* 33.6*   PLATELETCT 178 140* 141* 156*   PROTHROMBTM 13.3  --   --   --    INR 1.00  --   --   --        Imaging  No new imaging to review today.    Assessment/Plan  * Streptococcus pneumoniae meningitis- (present on admission)  Assessment & Plan  -presented with AMS and headaches  -LP done in ED \"milky\" showed greater than 15,000 WBC and greater than 600 protein  -PCR positive Streptococcus pneumoniae  -ID following. Continue current ABX regimen for now. Follow sensitivities  -Serial neurologic exams    Sepsis due to Streptococcus pneumoniae with encephalopathy without septic shock (HCC)  Assessment & Plan  This is Sepsis Present on admission  SIRS criteria identified on my evaluation include: Tachycardia, with heart rate greater than 90 BPM  Clinical indicators of end organ dysfunction include Toxic Metabolic Encephalopathy and GCS < 15  Source is Bacterial Meningitis  Sepsis protocol initiated  Crystalloid Fluid Administration: Resuscitation volume of 0 ordered. " Reason that resuscitation volume of less than 30ml/kg was ordered  His hemodynamics improved with intubation and he does not require fluids at this time.  PRN 30cc/kg LR bolus ordered for MAP <65 or SBP <90  IV antibiotics as appropriate for source of sepsis  Reassessment: I have reassessed the patient's hemodynamic status    Continue ABX per ID recommendations.  Continue steroids  Follow cultures  Hemodynamically stable off pressors    Bradycardia  Assessment & Plan  -sinus  -asymptomatic  -hemodynamically stable  -telemetry    Lumbar disc herniation  Assessment & Plan  -Patient complained of back pain prior to presentation  -MRI showed disc herniation L5-S1  -Multimodal pain management  -Outpatient neurosurgery follow-up    Acute encephalopathy  Assessment & Plan  -Due to bacterial meningitis  -Keep patient awake during the day and avoid daytime naps. Remove all unnecessary lines (central lines, peripheral IVs, feeding tubes, ho catheters).  -Avoid polypharmacy, frequent re-orientation, maximize family time at bedside, use glasses and hearing aids if needed, treat pain, encourage ambulation, minimize benzos/anticholinergic agents.   -Aspiration precautions  -Seizure precautions  -Fall precautions      On mechanically assisted ventilation (HCC)  Assessment & Plan  -Intubated for altered mental status  -Intubation date 9/12  -extubated 9/14         VTE:  Lovenox  Ulcer: Not Indicated  Lines: Ho Catheter  DC today.    I have performed a physical exam and reviewed and updated ROS and Plan today (9/15/2024). In review of yesterday's note (9/14/2024), there are no changes except as documented above.     Discussed patient condition and risk of morbidity and/or mortality with RN, RT, Patient, infectious disease, and my attending Dr. Dowell    Please note that this dictation was created using voice recognition software. I have made every reasonable attempt to correct obvious errors, but there may be errors of  grammar and possibly content that I did not discover before finalizing the note.    NAOMI Millan.

## 2024-09-16 ENCOUNTER — APPOINTMENT (OUTPATIENT)
Dept: RADIOLOGY | Facility: MEDICAL CENTER | Age: 34
DRG: 871 | End: 2024-09-16
Attending: INTERNAL MEDICINE
Payer: OTHER GOVERNMENT

## 2024-09-16 LAB
ALBUMIN SERPL BCP-MCNC: 3.1 G/DL (ref 3.2–4.9)
ALBUMIN/GLOB SERPL: 1.4 G/DL
ALP SERPL-CCNC: 39 U/L (ref 30–99)
ALT SERPL-CCNC: 47 U/L (ref 2–50)
ANION GAP SERPL CALC-SCNC: 11 MMOL/L (ref 7–16)
AST SERPL-CCNC: 34 U/L (ref 12–45)
BASOPHILS # BLD AUTO: 0.2 % (ref 0–1.8)
BASOPHILS # BLD: 0.02 K/UL (ref 0–0.12)
BILIRUB SERPL-MCNC: 0.7 MG/DL (ref 0.1–1.5)
BUN SERPL-MCNC: 21 MG/DL (ref 8–22)
CALCIUM ALBUM COR SERPL-MCNC: 8.8 MG/DL (ref 8.5–10.5)
CALCIUM SERPL-MCNC: 8.1 MG/DL (ref 8.5–10.5)
CHLORIDE SERPL-SCNC: 105 MMOL/L (ref 96–112)
CO2 SERPL-SCNC: 24 MMOL/L (ref 20–33)
CREAT SERPL-MCNC: 0.93 MG/DL (ref 0.5–1.4)
EOSINOPHIL # BLD AUTO: 0 K/UL (ref 0–0.51)
EOSINOPHIL NFR BLD: 0 % (ref 0–6.9)
ERYTHROCYTE [DISTWIDTH] IN BLOOD BY AUTOMATED COUNT: 40.2 FL (ref 35.9–50)
GFR SERPLBLD CREATININE-BSD FMLA CKD-EPI: 110 ML/MIN/1.73 M 2
GLOBULIN SER CALC-MCNC: 2.2 G/DL (ref 1.9–3.5)
GLUCOSE SERPL-MCNC: 166 MG/DL (ref 65–99)
HCT VFR BLD AUTO: 35.8 % (ref 42–52)
HGB BLD-MCNC: 12.4 G/DL (ref 14–18)
IMM GRANULOCYTES # BLD AUTO: 0.1 K/UL (ref 0–0.11)
IMM GRANULOCYTES NFR BLD AUTO: 1.2 % (ref 0–0.9)
LYMPHOCYTES # BLD AUTO: 0.7 K/UL (ref 1–4.8)
LYMPHOCYTES NFR BLD: 8.3 % (ref 22–41)
MAGNESIUM SERPL-MCNC: 2.2 MG/DL (ref 1.5–2.5)
MCH RBC QN AUTO: 30.2 PG (ref 27–33)
MCHC RBC AUTO-ENTMCNC: 34.6 G/DL (ref 32.3–36.5)
MCV RBC AUTO: 87.3 FL (ref 81.4–97.8)
MONOCYTES # BLD AUTO: 0.37 K/UL (ref 0–0.85)
MONOCYTES NFR BLD AUTO: 4.4 % (ref 0–13.4)
NEUTROPHILS # BLD AUTO: 7.24 K/UL (ref 1.82–7.42)
NEUTROPHILS NFR BLD: 85.9 % (ref 44–72)
NRBC # BLD AUTO: 0 K/UL
NRBC BLD-RTO: 0 /100 WBC (ref 0–0.2)
PHOSPHATE SERPL-MCNC: 3.1 MG/DL (ref 2.5–4.5)
PLATELET # BLD AUTO: 184 K/UL (ref 164–446)
PMV BLD AUTO: 10.1 FL (ref 9–12.9)
POTASSIUM SERPL-SCNC: 4.2 MMOL/L (ref 3.6–5.5)
PROT SERPL-MCNC: 5.3 G/DL (ref 6–8.2)
RBC # BLD AUTO: 4.1 M/UL (ref 4.7–6.1)
SODIUM SERPL-SCNC: 140 MMOL/L (ref 135–145)
WBC # BLD AUTO: 8.4 K/UL (ref 4.8–10.8)

## 2024-09-16 PROCEDURE — 84100 ASSAY OF PHOSPHORUS: CPT

## 2024-09-16 PROCEDURE — 36415 COLL VENOUS BLD VENIPUNCTURE: CPT

## 2024-09-16 PROCEDURE — 700102 HCHG RX REV CODE 250 W/ 637 OVERRIDE(OP): Performed by: INTERNAL MEDICINE

## 2024-09-16 PROCEDURE — A9270 NON-COVERED ITEM OR SERVICE: HCPCS | Performed by: INTERNAL MEDICINE

## 2024-09-16 PROCEDURE — 80053 COMPREHEN METABOLIC PANEL: CPT

## 2024-09-16 PROCEDURE — 700111 HCHG RX REV CODE 636 W/ 250 OVERRIDE (IP): Performed by: INTERNAL MEDICINE

## 2024-09-16 PROCEDURE — 700102 HCHG RX REV CODE 250 W/ 637 OVERRIDE(OP)

## 2024-09-16 PROCEDURE — 700105 HCHG RX REV CODE 258: Performed by: INTERNAL MEDICINE

## 2024-09-16 PROCEDURE — 99223 1ST HOSP IP/OBS HIGH 75: CPT | Performed by: PHYSICAL MEDICINE & REHABILITATION

## 2024-09-16 PROCEDURE — 92523 SPEECH SOUND LANG COMPREHEN: CPT

## 2024-09-16 PROCEDURE — 97530 THERAPEUTIC ACTIVITIES: CPT

## 2024-09-16 PROCEDURE — 83735 ASSAY OF MAGNESIUM: CPT

## 2024-09-16 PROCEDURE — A9270 NON-COVERED ITEM OR SERVICE: HCPCS

## 2024-09-16 PROCEDURE — 97535 SELF CARE MNGMENT TRAINING: CPT

## 2024-09-16 PROCEDURE — 99233 SBSQ HOSP IP/OBS HIGH 50: CPT | Performed by: INTERNAL MEDICINE

## 2024-09-16 PROCEDURE — 85025 COMPLETE CBC W/AUTO DIFF WBC: CPT

## 2024-09-16 PROCEDURE — 700102 HCHG RX REV CODE 250 W/ 637 OVERRIDE(OP): Performed by: NURSE PRACTITIONER

## 2024-09-16 PROCEDURE — A9270 NON-COVERED ITEM OR SERVICE: HCPCS | Performed by: NURSE PRACTITIONER

## 2024-09-16 PROCEDURE — 97530 THERAPEUTIC ACTIVITIES: CPT | Mod: CQ

## 2024-09-16 PROCEDURE — 700111 HCHG RX REV CODE 636 W/ 250 OVERRIDE (IP): Performed by: STUDENT IN AN ORGANIZED HEALTH CARE EDUCATION/TRAINING PROGRAM

## 2024-09-16 PROCEDURE — 770020 HCHG ROOM/CARE - TELE (206)

## 2024-09-16 PROCEDURE — 97116 GAIT TRAINING THERAPY: CPT | Mod: CQ

## 2024-09-16 RX ORDER — CHLORPROMAZINE HYDROCHLORIDE 25 MG/1
12.5 TABLET, FILM COATED ORAL 3 TIMES DAILY
Status: COMPLETED | OUTPATIENT
Start: 2024-09-16 | End: 2024-09-17

## 2024-09-16 RX ADMIN — SODIUM CHLORIDE 4 MILLION UNITS: 9 INJECTION, SOLUTION INTRAVENOUS at 09:55

## 2024-09-16 RX ADMIN — SENNOSIDES AND DOCUSATE SODIUM 2 TABLET: 50; 8.6 TABLET ORAL at 05:21

## 2024-09-16 RX ADMIN — SODIUM CHLORIDE 4 MILLION UNITS: 9 INJECTION, SOLUTION INTRAVENOUS at 18:06

## 2024-09-16 RX ADMIN — ENOXAPARIN SODIUM 40 MG: 100 INJECTION SUBCUTANEOUS at 18:05

## 2024-09-16 RX ADMIN — CHLORPROMAZINE HYDROCHLORIDE 12.5 MG: 25 TABLET, FILM COATED ORAL at 18:05

## 2024-09-16 RX ADMIN — Medication 10 MG: at 22:43

## 2024-09-16 RX ADMIN — DEXAMETHASONE SODIUM PHOSPHATE 10 MG: 4 INJECTION INTRA-ARTICULAR; INTRALESIONAL; INTRAMUSCULAR; INTRAVENOUS; SOFT TISSUE at 20:39

## 2024-09-16 RX ADMIN — DEXAMETHASONE SODIUM PHOSPHATE 10 MG: 4 INJECTION INTRA-ARTICULAR; INTRALESIONAL; INTRAMUSCULAR; INTRAVENOUS; SOFT TISSUE at 14:50

## 2024-09-16 RX ADMIN — DEXAMETHASONE SODIUM PHOSPHATE 10 MG: 4 INJECTION INTRA-ARTICULAR; INTRALESIONAL; INTRAMUSCULAR; INTRAVENOUS; SOFT TISSUE at 01:19

## 2024-09-16 RX ADMIN — SODIUM CHLORIDE 4 MILLION UNITS: 9 INJECTION, SOLUTION INTRAVENOUS at 22:35

## 2024-09-16 RX ADMIN — CHLORPROMAZINE HYDROCHLORIDE 12.5 MG: 25 TABLET, FILM COATED ORAL at 20:40

## 2024-09-16 RX ADMIN — SODIUM CHLORIDE 4 MILLION UNITS: 9 INJECTION, SOLUTION INTRAVENOUS at 05:21

## 2024-09-16 RX ADMIN — SODIUM CHLORIDE 4 MILLION UNITS: 9 INJECTION, SOLUTION INTRAVENOUS at 14:53

## 2024-09-16 RX ADMIN — DEXAMETHASONE SODIUM PHOSPHATE 10 MG: 4 INJECTION INTRA-ARTICULAR; INTRALESIONAL; INTRAMUSCULAR; INTRAVENOUS; SOFT TISSUE at 08:27

## 2024-09-16 RX ADMIN — SODIUM CHLORIDE 4 MILLION UNITS: 9 INJECTION, SOLUTION INTRAVENOUS at 01:17

## 2024-09-16 ASSESSMENT — COGNITIVE AND FUNCTIONAL STATUS - GENERAL
DAILY ACTIVITIY SCORE: 23
SUGGESTED CMS G CODE MODIFIER DAILY ACTIVITY: CI
MOVING TO AND FROM BED TO CHAIR: A LITTLE
MOVING FROM LYING ON BACK TO SITTING ON SIDE OF FLAT BED: A LITTLE
STANDING UP FROM CHAIR USING ARMS: A LITTLE
HELP NEEDED FOR BATHING: A LITTLE
CLIMB 3 TO 5 STEPS WITH RAILING: A LOT
TURNING FROM BACK TO SIDE WHILE IN FLAT BAD: A LITTLE
WALKING IN HOSPITAL ROOM: A LITTLE
MOBILITY SCORE: 17
SUGGESTED CMS G CODE MODIFIER MOBILITY: CK

## 2024-09-16 ASSESSMENT — ENCOUNTER SYMPTOMS
COUGH: 0
ABDOMINAL PAIN: 0
DIARRHEA: 0
WEAKNESS: 0
HEADACHES: 0
VOMITING: 0
DOUBLE VISION: 0
BLURRED VISION: 0
FEVER: 0
BACK PAIN: 0
CHILLS: 0
PALPITATIONS: 0
CONSTIPATION: 0
MYALGIAS: 0
NAUSEA: 0
FALLS: 0

## 2024-09-16 ASSESSMENT — GAIT ASSESSMENTS
ASSISTIVE DEVICE: FRONT WHEEL WALKER
DISTANCE (FEET): 50
GAIT LEVEL OF ASSIST: MINIMAL ASSIST
DEVIATION: SHUFFLED GAIT

## 2024-09-16 ASSESSMENT — PAIN DESCRIPTION - PAIN TYPE: TYPE: ACUTE PAIN

## 2024-09-16 NOTE — PROGRESS NOTES
Monitor summary: SB 43-51, RI -0.14, QRS -0.08, QT -0.41, with (O) PACs per strip from the monitor room.

## 2024-09-16 NOTE — PREADMISSION SCREENING NOTE
Updated Pre-Screen Assessment     Name: Kwesi Rivera  MRN: [id number]  : 1990    Medical Status/ Changes:       Hospital Medicine Daily Progress Note     Date of Service  2024     Chief Complaint  Kwesi Rivera is a 34 y.o. male admitted 2024 with altered mentation     Hospital Course  34 y.o. male who presented 2024 with no significant past medical history, presented to outside facility with altered mentation. The patient presented to the sending facility with headaches, he was nonverbal at the time, GCS of 9, there were no focal deficits, the patient was unable to follow commands, a CT a CTA of the head showed some diffuse edema bilateral in the hemispheres. He was transferred for further neurological evaluation. Intubated on arrival and underwent a lumbar puncture with milky appearing CSF, PCR showed strep pneumo with greater than 15,000 white cell count, greater than 600 protein on CSF, the patient was admitted to ICU, started on vancomycin, ceftriaxone, steroids. MRI brain performed without evidence of venous thrombosis. Infectious disease consulted, the patient was transition to high-dose penicillin.      Interval Problem Update  Patient was seen and examined at bedside.  No acute events overnight. Patient is resting comfortably in bed and in no acute distress.      Completed steroid therapy  Antibiotics through   Midline placed  ID recs  Plan for discharge to [facility]     I have discussed this patient's plan of care and discharge plan at IDT rounds today with Case Management, Nursing, Nursing leadership, and other members of the IDT team.     Consultants/Specialty  infectious disease     Code Status  Full Code     Disposition  The patient is medically cleared for discharge to home or a post-acute facility.  Anticipate discharge to: an inpatient rehabilitation hospital     I have placed the appropriate orders for post-discharge needs.     Review of Systems  Review of Systems   Constitutional:   Negative for chills and fever.   HENT:  Negative for congestion.    Eyes:  Negative for blurred vision and double vision.   Respiratory:  Negative for cough.    Cardiovascular:  Negative for chest pain and palpitations.   Gastrointestinal:  Negative for nausea and vomiting.   Genitourinary:  Negative for dysuria and frequency.   Musculoskeletal:  Negative for back pain and falls.   Neurological:  Positive for headaches.         Physical Exam  Temp:  [36.4 °C (97.5 °F)-37 °C (98.6 °F)] 37 °C (98.6 °F)  Pulse:  [44-72] 59  Resp:  [17-18] 18  BP: ()/(52-75) 116/57  SpO2:  [91 %-96 %] 92 %     Physical Exam  Vitals and nursing note reviewed.   Constitutional:       General: He is not in acute distress.  HENT:      Right Ear: External ear normal.      Left Ear: External ear normal.      Nose: No congestion.      Mouth/Throat:      Pharynx: No oropharyngeal exudate.   Eyes:      General: No scleral icterus.  Cardiovascular:      Rate and Rhythm: Bradycardia present.      Heart sounds: No murmur heard.  Pulmonary:      Breath sounds: No wheezing.   Abdominal:      Tenderness: There is no abdominal tenderness. There is no guarding or rebound.   Musculoskeletal:         General: No swelling or deformity.   Skin:     Coloration: Skin is not jaundiced.      Findings: No bruising.   Neurological:      General: No focal deficit present.      Mental Status: He is alert.      Motor: No weakness.   Psychiatric:         Mood and Affect: Mood normal.         Behavior: Behavior normal.      Patient was seen and examined at bedside. No changes in physical exam from prior evaluation.        Fluids     Intake/Output Summary (Last 24 hours) at 9/18/2024 1645  Last data filed at 9/18/2024 0236    Gross per 24 hour  Intake 600 ml  Output --  Net 600 ml        Laboratory    Recent Labs     09/16/24 0444 09/17/24  0109  WBC 8.4 6.7  RBC 4.10* 4.25*  HEMOGLOBIN 12.4* 12.6*  HEMATOCRIT 35.8* 36.9*  MCV 87.3 86.8  MCH 30.2 29.6  MCHC 34.6 34.1  RDW 40.2 38.9  PLATELETCT 184 199  MPV 10.1 10.1       Recent Labs    09/16/24 0444 09/17/24  0109  SODIUM 140 142  POTASSIUM 4.2 4.1  CHLORIDE 105 106  CO2 24 24  GLUCOSE 166* 159*  BUN 21 23*  CREATININE 0.93 0.78  CALCIUM 8.1* 8.6                       Imaging    IR-MIDLINE CATHETER INSERTION WO GUIDANCE > AGE 3  Final Result                Ultrasound-guided midline placement performed by qualified nursing staff   as above.         MR-LUMBAR SPINE-WITH & W/O  Final Result     1.  T2 hypointense fluid-fluid level in the dependent caudal thecal sac in keeping with the history of meningitis. This likely represents intradural exudate in the setting of meningitis. Dependently layering subarachnoid hemorrhage within the thecal sac   could have a similar appearance.  2.  L4-5 tiny midline disc bulge with underlying tiny annular fissure. No central or foraminal stenosis.  3.  L5-S1 large left paramedian disc protrusion/extrusion with marked compromise of the emerging left S1 nerve root. Mild right foraminal stenosis. Moderate left foraminal stenosis.     MR-THORACIC SPINE-WITH & W/O  Final Result     MRI OF THE THORACIC SPINE WITHOUT AND WITH CONTRAST WITHIN NORMAL LIMITS.     MR-CERVICAL SPINE-WITH & W/O  Final Result     1.  C5-6 minimal midline disc/osteophyte complex. No central or foraminal stenosis.  2.  C6-C7 moderate-marked right foraminal stenosis due to spondylotic change.  3.  No myelopathic cord signal. Concerning the history of meningitis, no abnormal intradural extra medullary enhancement.     MR-BRAIN-WITH & W/O  Final Result     1.  The FLAIR images demonstrates diffuse hyperintense signal within the subarachnoid spaces. There is also minimal abnormal ependymal T2 hyperintensity. There is no hydrocephalus. There is no abnormal contrast enhancement.  "This finding is concerning for   acute Meningitis/meningeal inflammation. Please correlate with the CSF study.  2.  Mild tonsillar ectopia.     MR-VENOGRAM (MRV) HEAD  Final Result     Unremarkable MR venogram of the cerebral veins.     DX-CHEST-PORTABLE (1 VIEW)  Final Result        No acute cardiac or pulmonary abnormality is identified.           Assessment/Plan  * Streptococcus pneumoniae meningitis- (present on admission)  Assessment & Plan  -presented with AMS and headaches  -LP done in ED \"milky\" showed greater than 15,000 WBC and greater than 600 protein  -PCR positive Streptococcus pneumoniae  -ID consulted, transition to penicillin IV every 4 hours, end date 9/26     Completed steroid therapy  Antibiotics through 09/26  Midline placed  ID recs  Plan for discharge to [facility]     Lumbar disc herniation  Assessment & Plan  -Patient complained of back pain prior to presentation  -MRI showed disc herniation L5-S1  -Multimodal pain management  -Outpatient neurosurgery follow-up     Acute encephalopathy  Assessment & Plan  -Due to bacterial meningitis  -Keep patient awake during the day and avoid daytime naps. Remove all unnecessary lines (central lines, peripheral IVs, feeding tubes, ho catheters).  -Avoid polypharmacy, frequent re-orientation, maximize family time at bedside, use glasses and hearing aids if needed, treat pain, encourage ambulation, minimize benzos/anticholinergic agents.   -Aspiration precautions  -Seizure precautions  -Fall precautions     Bradycardia  Assessment & Plan  -asymptomatic  -hemodynamically stable  -telemetry     Sepsis due to Streptococcus pneumoniae with encephalopathy without septic shock (HCC)  Assessment & Plan  This is Sepsis Present on admission     On mechanically assisted ventilation (HCC)  Assessment & Plan  -extubated 9/14              VTE prophylaxis:    enoxaparin ppx        I have performed a physical exam and reviewed and updated ROS and Plan today (9/18/2024). In " review of yesterday's note (9/17/2024), there are no changes except as documented above.     Continue IV penicillin  NSAID for headache        Functional Status/ Changes:     Physical Therapy   Daily Treatment     Patient Name: Kwesi Rivera  Age:  34 y.o., Sex:  male  Medical Record #: [id number]  Today's Date: 9/18/2024     Precautions  Precautions: Fall Risk     Assessment     The pt found flat in bed w/room dark. The pt is supervision w/bed mobility, SBA w/STS and transfers due to impulsivity. Once standing initiated amb w/FWW, distances remains limited by c.o dizziness and fatigue.   The pt would benefit from a short stay @ [facility] for balance training and improving activity tolerance progressing him back to his PLOF.  PT will cont to follow.      Plan     Treatment Plan Status: Continue Current Treatment Plan  Type of Treatment: Bed Mobility, Equipment, Family / Caregiver Training, Gait Training, Neuro Re-Education / Balance, Stair Training, Therapeutic Activities, Therapeutic Exercise  Treatment Frequency: 5 Times per Week  Treatment Duration: Until Therapy Goals Met     DC Equipment Recommendations: Unable to determine at this time  Discharge Recommendations: Recommend post-acute placement for additional physical therapy services prior to discharge home ([facility])     Objective          09/17/24 1500  Time In/Time Out  Therapy Start Time 1445  Therapy End Time 1513  Total Therapy Time 28  Charge Group  PT Gait Training (Units) 1  PT Therapeutic Activities (Units) 1  Total Time Spent  PT Gait Training Time Spent (Mins) 10  PT Therapeutic Activities Time Spent (Mins) 18  PT Total Time Spent (Calculated) 28  Precautions  Precautions Fall Risk  Pain 0 - 10 Group  Pain Rating Scale (NPRS) 0  Other Treatments  Other Treatments Provided Pt instructed on getting OOB for meals and to amb w/nrsg @ least 1 more time today  Balance  Sitting Balance (Static) Good  Sitting Balance (Dynamic) Fair +  Standing Balance  (Static) Fair  Standing Balance (Dynamic) Fair -  Weight Shift Sitting Good  Weight Shift Standing Fair  Skilled Intervention Verbal Cuing  Comments standing w/FWW  Bed Mobility   Supine to Sit Supervised  Sit to Supine Supervised  Scooting Supervised  Rolling Supervised  Gait Analysis  Gait Level Of Assist Minimal Assist  Assistive Device Front Wheel Walker  Distance (Feet) 70  # of Times Distance was Traveled 1  Deviation Shuffled Gait  Skilled Intervention Verbal Cuing  Comments The pt tolerated increase in distance, c/o dizziness and fatigue. The pt amb tolerance remains significantly below his baseline.  Functional Mobility  Sit to Stand Standby Assist  Bed, Chair, Wheelchair Transfer Standby Assist  Skilled Intervention Verbal Cuing  Comments w/FWW, impulsive  6 Clicks Assessment - How much HELP from from another person do you currently need... (If the patient hasn't done an activity recently, how much help from another person do you think he/she would need if he/she tried?)  Turning from your back to your side while in a flat bed without using bedrails? 3  Moving from lying on your back to sitting on the side of a flat bed without using bedrails? 3  Moving to and from a bed to a chair (including a wheelchair)? 3  Standing up from a chair using your arms (e.g., wheelchair, or bedside chair)? 3  Walking in hospital room? 3  Climbing 3-5 steps with a railing? 2  6 clicks Mobility Score 17  Short Term Goals   Short Term Goal # 1 Patient will perform supine-sit, sit-supine with HOB flat without rails with supervision in 6 visits to safely get in & out of bed  Goal Outcome # 1 goal not met  Short Term Goal # 2 Patient will perform sit-stand with LRAD with supervision in 6 visits to progress with functional mobility  Goal Outcome # 2 Goal not met  Short Term Goal # 3 Patient will perform chair transfers with LRAD with supervision in 6 visits to safely get OOB to chair  Goal Outcome # 3 Goal not met  Short Term Goal  # 4 Patient will ambulate 100 feet with LRAD with supervision in 6 visits to safely ambulate household distance  Goal Outcome # 4 Goal not met  Physical Therapy Treatment Plan  Physical Therapy Treatment Plan Continue Current Treatment Plan  Anticipated Discharge Equipment and Recommendations  DC Equipment Recommendations Unable to determine at this time  Discharge Recommendations Recommend post-acute placement for additional physical therapy services prior to discharge home  ([facility])  Interdisciplinary Plan of Care Collaboration  IDT Collaboration with  Nursing  Patient Position at End of Therapy Seated;Chair Alarm On;Call Light within Reach;Tray Table within Reach;Phone within Reach  Collaboration Comments Nrsg notified nrsg of pts tx efforts.  Session Information  Date / Session Number  --3 (3/5, ) PTA/2  Supervising Physical Therapist (PTA Treatments Only)  Supervising Physical Therapist Emelyn No           Reviewer: Calixto Washington L.P.N.  Date: 2024  Time: 10:18 AM  Pre-Admission Screening Form    Patient Information:   Name: Kwesi Rivera     MRN: [id number]       : 1990      Age: 34 y.o.   Gender: male      Race: White [7]       Marital Status: Single [1]  Family Contact: Santa Rivera Kristie        Relationship: Significant other [13]  Mother [8]  Home Phone:              Cell Phone: [phone]  Advanced Directives: None  Code Status:  FULL  Current Attending Provider: Braeden Child D.O.  Referring Physician: Dr. Child      Physiatrist Consult: Dr. Lee       Referral Date: 2024  Primary Payor Source:  [facility] [place]  Secondary Payor Source:      Medical Information:   Date of Admission to Acute Care [place]2024  Room Number: S195/02  Rehabilitation Diagnosis: 0002.1 - Brain Dysfunction: Non-Traumatic    There is no immunization history on file for this patient.  No Known Allergies  No past medical history on file.  No past surgical history on  "file.    History Leading to Admission, Conditions that Caused the Need for Rehab (CMS): encephalopathy bacterial meningitis, Respiratory failure, Hyperglycemia  Co-morbidities:  as listed above and below  Potential Risk - Complications: Aphasia, Cognitive Impairment, Contractures, Deep Vein Thrombosis, Dysphagia, Incontinence, Malnutrition, Pain, Paralysis, and Perceptual Impairment  Level of Risk: High    Ongoing Medical Management Needed (Medical/Nursing Needs):   Patient Active Problem List    Diagnosis Date Noted    Bradycardia 09/15/2024    Acute encephalopathy 09/14/2024    Lumbar disc herniation 09/14/2024    Streptococcus pneumoniae meningitis 09/12/2024    On mechanically assisted ventilation (HCC) 09/12/2024    Sepsis due to Streptococcus pneumoniae with encephalopathy without septic shock (HCC) 09/12/2024                                               Physical Medicine and Rehabilitation Consultation                                                                                  Date of initial consultation: 9/16/2024  Requesting provider: ordered by Braeden Child D.O. at 09/16/24 1036    Consulting provider: Aleah Lee D.O.  Reason for consultation: assess for acute inpatient rehab appropriateness  LOS: 4 Day(s)     Chief complaint: AMS      HPI: The patient is a 34 y.o.  male with no known PMHx;  who presented on 9/12/2024  5:50 PM with AMS after having a HA. Per documentation, patient had a headachge on 9/12, he laid down to take a nap and woke up with ASM. Upon eval at [facility] ED, patient was non verbal and had GCS 9. CT head and CTA head and neck had no obvious abnormalities but showed diffuse edema in the b/l hemispheres. Patient was transferred to [facility] for higher level of care, patient was intubated for AMS.  neurology was consulted, and patient had LP performed that showed \"milky\" CSF, with elevated WBC and protein in CSF. MRI brain obtained showed  hyperintense signal within the " subarachnoid spaces concerning for meningitis. MRI L spine showed fluid in the T2 thecal sc, consistent with meningitis. MRV head negative for venous thrombosis.  PCR panel + for strep pneumonia. Blood cultures with NGTD.  ID consulted, patient was sarted on vanco and ceftriaxone, which was transitioned to  Penicillin G x 14 days, stop date is 9/26. Patient was extubated on 9/14, and he currently remains on dexamethasone taper.      Patient seen and examined at bedside with wife. Reports some grogginess, but  patient reports he feels better, however feels wobbly when he gets up to the bathroom.  Does not report HA, lightheadedness, SOB, CP, abdominal pain, or changes in numbness/tingling/weakness.            Social Hx:  Patient lives in [place] in a 2 story house with flight of stairs to access bathroom with shower and bedroom    FOS  MALIK  At prior level of function patient was Independent with mobility and ADLs.         Employment: [employer]   Tobacco: Denied  Alcohol: Denied  Drugs: Denied      THERAPY:  Restrictions: Fall Risk, swallow precautions   PT: Functional mobility   9/13 unable to participate in functional tasks, was vented at time of eval      OT: ADLs  9/13 Total A upper and lower body dressing, was limited by ventilator      SLP:   9/15 Regular solids and thins      IMAGING:  MR-LUMBAR SPINE-WITH & W/O  Narrative: 9/13/2024 4:53 PM     HISTORY/REASON FOR EXAM:  Back pain with known meningitis.     TECHNIQUE/EXAM DESCRIPTION:  MRI of the lumbar spine without and with contrast.     The study was performed on a [employer] Signa 1.5 Blanca MRI scanner.  T1 sagittal, T2 fast spin-echo sagittal, and T2 axial images were obtained of the lumbar spine. T1 post-contrast fat-suppressed sagittal images were obtained. Optional T2 fat-suppressed sagittal and T1 post-contrast axial images may be obtained.     15 mL ProHance gadolinium contrast was administered intravenously.     COMPARISON:  MRI thoracic  spine from today's date     FINDINGS:  Alignment in the lumbar spine is normal. Marrow signal in the vertebral bodies is normal. There is no abnormal osseous enhancement. There is no evidence of discitis or osteomyelitis. There is no evidence of epidural phlegmon or epidural abscess.     The conus is normal in position and signal with its tip at the L2 level. There is no abnormal cord enhancement. There is no abnormal intradural extra medullary enhancement. There is no abnormal enhancement of nerve roots of the cauda equina.     However, there is T2 hypointense fluid-fluid level in the dependent caudal thecal sac. In keeping with history of meningitis, this may represent intradural exudate. Dependently layering subarachnoid hemorrhage within the thecal sac could have a similar   appearance. (2 sagittal image 8, series 17, T2 fat-suppressed sagittal image 8, series 20, T2 axial image 4, series 22).     At T12-L1, no abnormality.     At L1-2, no abnormality.     At L2-3, no abnormality.     At L3-4, no abnormality.     At L4-5, there is a tiny midline disc bulge with an underlying tiny annular fissure (T2 axial image 13, series 21). No central or foraminal stenosis.     At L5-S1, there is a large left paramedian disc protrusion/extrusion. There is prominent impingement on the left ventral thecal sac. There is marked left lateral recess stenosis with posterior displacement of the emerging S1 nerve root (T2 axial 9,   series 21). There is mild hypertrophic facet arthropathy. There is mild right foraminal stenosis and moderate left stenosis.  Impression: 1.  T2 hypointense fluid-fluid level in the dependent caudal thecal sac in keeping with the history of meningitis. This likely represents intradural exudate in the setting of meningitis. Dependently layering subarachnoid hemorrhage within the thecal sac   could have a similar appearance.  2.  L4-5 tiny midline disc bulge with underlying tiny annular fissure. No central  or foraminal stenosis.  3.  L5-S1 large left paramedian disc protrusion/extrusion with marked compromise of the emerging left S1 nerve root. Mild right foraminal stenosis. Moderate left foraminal stenosis.  MR-THORACIC SPINE-WITH & W/O  Narrative: 9/13/2024 4:52 PM     HISTORY/REASON FOR EXAM:  Back pain with known meningitis.     TECHNIQUE/EXAM DESCRIPTION:  MRI of the thoracic spine without and with contrast.     The study was performed on a [employer] Signa 1.5 Blanca MRI scanner.  T1 sagittal, T2 fast spin-echo sagittal, and T2 axial images were obtained of the thoracic spine. T1 post-contrast fat suppressed sagittal images were obtained. Optional T1 post-contrast axial images may be obtained.     15 mL ProHance gadolinium contrast was administered intravenously.     COMPARISON:  MRI lumbar spine from today's date     FINDINGS:  Alignment in the thoracic spine is normal. Marrow signal in the vertebral bodies is normal. There is no abnormal osseous enhancement. There is no evidence of discitis or osteomyelitis.     The prevertebral and paraspinous soft tissues are unremarkable except for bibasilar atelectatic infiltrates and tiny bilateral pleural effusions.     The thoracic spinal cord is normal in caliber and signal throughout its course. There is no abnormal cord enhancement. There is no abnormal intradural extra medullary enhancement.     There is no significant disc bulge or protrusion, central stenosis, or foraminal stenosis at any thoracic level.  Impression: MRI OF THE THORACIC SPINE WITHOUT AND WITH CONTRAST WITHIN NORMAL LIMITS.  MR-CERVICAL SPINE-WITH & W/O  Narrative: 9/13/2024 4:52 PM     HISTORY/REASON FOR EXAM:  Back pain with known meningitis.     TECHNIQUE/EXAM DESCRIPTION:  MRI of the cervical spine without and with contrast.     The study was performed on a [employer] Signa 1.5 Blanca MRI scanner.  T1 sagittal, T2 fast spin-echo sagittal, and gradient echo axial images were obtained of the cervical spine.  Optional T2 fat-suppressed sagittal images may also be obtained. T1 post-contrast fat suppressed sagittal images were obtained of the cervical   spine. Optional T1 post-contrast axial images may be obtained.     15 mL ProHance gadolinium contrast was administered intravenously.     COMPARISON: CT cervical spine outside films 9/12/2024     FINDINGS:  Alignment in the cervical spine shows no segmental subluxation.     Marrow signal in the vertebral bodies is normal. There is no abnormal osseous enhancement.     The prevertebral soft tissues are unremarkable except for some secretions in the pharynx with endotracheal tube and enteric tube in situ.     There are no anomalies at the craniovertebral junction.  The cervical spinal cord is normal in caliber and signal throughout its course. There is no abnormal cord enhancement. There is no abnormal intradural extra medullary enhancement.     At C2-3, no abnormality.     At C3-4, no abnormality.     At C4-5, no abnormality.     At C5-6, minimal disc-osteophyte complex. Some thinning of ventral subarachnoid space. Dorsal subarachnoid space remains intact. There is no central stenosis. The neural foramina are intact.     At C6-7, no significant disc bulge or protrusion. No central stenosis. The left neural foramen is intact. There is moderate-marked right foraminal stenosis due to spondylotic change.     At C7-T1, no abnormality.  Impression: 1.  C5-6 minimal midline disc/osteophyte complex. No central or foraminal stenosis.  2.  C6-C7 moderate-marked right foraminal stenosis due to spondylotic change.  3.  No myelopathic cord signal. Concerning the history of meningitis, no abnormal intradural extra medullary enhancement.  MR-VENOGRAM (MRV) HEAD  Narrative: 9/12/2024 8:35 PM     HISTORY/REASON FOR EXAM:  ALTERED LEVEL OF CONSCIOUSNESS.     TECHNIQUE/EXAM DESCRIPTION:  MR venogram of the cerebral veins-time-of-flight sequences COMPARISON:  None     FINDINGS:  The superior  sagittal sinus is widely patent. There is widely patent dominant RIGHT transverse and sigmoid sinuses. There is hypoplastic LEFT transverse and sigmoid sinuses.  Impression: Unremarkable MR venogram of the cerebral veins.  MR-BRAIN-WITH & W/O  Narrative: 9/12/2024 8:35 PM     HISTORY/REASON FOR EXAM:  ALTERED LEVEL OF CONSCIOUSNESS.     TECHNIQUE/EXAM DESCRIPTION: MR brain with and without contrast.     Multiplanar multisequence MR examination of the brain with and without contrast done on 1.5 MRI scanner.     15 mL ProHance contrast was administered intravenously.     COMPARISON:  None.     FINDINGS: The FLAIR images demonstrates diffuse hyperintense signal within the subarachnoid spaces. There is also minimal abnormal ependymal T2 hyperintensity. There is no hydrocephalus. There is no abnormal contrast enhancement.     There is no restricted diffusion, acute hemorrhage, hydrocephalus, intracranial space-occupying lesion, abnormal volume loss, vasogenic edema or mass effect. There is mild tonsillar ectopia. The gray matter structures are unremarkable. The supra and   infratentorial white matter is unremarkable.  There is no subdural or epidural space-occupying process.     The visualized flow voids of the cerebral vasculature are unremarkable.  There is no large lesion identified in the expected course of the intracranial portions of the cranial nerves.  The visualized bones, muscles, adipose tissue and the glands are unremarkable. There is mucosal thickening in the paranasal sinuses.  Impression: 1.  The FLAIR images demonstrates diffuse hyperintense signal within the subarachnoid spaces. There is also minimal abnormal ependymal T2 hyperintensity. There is no hydrocephalus. There is no abnormal contrast enhancement. This finding is concerning for   acute Meningitis/meningeal inflammation. Please correlate with the CSF study.  2.  Mild tonsillar ectopia.           PROCEDURES:  None      PMH:  Past Medical  "History  No past medical history on file.       PSH:  Past Surgical History  No past surgical history on file.       FHX:  Family History  No family history on file.       Medications:  Current Facility-Administered Medications  Medication Dose   penicillin G potassium 4 Million Units in  mL IVPB  4 Million Units   senna-docusate (Pericolace Or Senokot S) 8.6-50 MG per tablet 2 Tablet  2 Tablet    And   polyethylene glycol/lytes (Miralax) Packet 1 Packet  1 Packet   acetaminophen (Tylenol) tablet 650 mg  650 mg   acetaminophen (Tylenol) suppository 650 mg  650 mg   Respiratory Therapy Consult     hydrALAZINE (Apresoline) injection 10 mg  10 mg   labetalol (Normodyne/Trandate) injection 10 mg  10 mg   dexamethasone (Decadron) injection 10 mg  10 mg   ondansetron (Zofran) syringe/vial injection 4 mg  4 mg   enoxaparin (Lovenox) inj 40 mg  40 mg        Allergies:  Allergies  No Known Allergies          Physical Exam:  Vitals: /77   Pulse (!) 43   Temp 36.7 °C (98.1 °F) (Temporal)   Resp 18   Ht 1.753 m (5' 9\")   Wt 85.6 kg (188 lb 11.4 oz)   SpO2 96%   Gen: NAD, laying comfortably in bed, wife at bedside   Head:  NC/AT  Eyes/ Nose/ Mouth: PERRLA, moist mucous membranes  Cardio: RRR, good distal perfusion, warm extremities  Pulm: normal respiratory effort, no cyanosis, on 2 L   Abd: Soft NTND, negative borborygmi   Ext: No peripheral edema. No calf tenderness. No clubbing.     Mental status:  A&Ox4 (person, place, date, situation) answers questions appropriately follows commands,   Speech: fluent, no aphasia or dysarthria     CRANIAL NERVES:  2,3: visual acuity grossly intact, PERRL  3,4,6: EOMI bilaterally, no nystagmus or diplopia  5: sensation intact to light touch bilaterally and symmetric  7: no facial asymmetry  8: hearing grossly intact     Motor:                            Upper Extremity  Myotome R L  Shoulder flexion C5 5/5 5/5  Elbow flexion C5 5/5 5/5  Wrist extension C6 5/5 5/5  Elbow " extension C7 5/5 5/5  Finger flexion C8 5/5 5/5  Finger abduction T1 5/5 5/5     Lower Extremity Myotome R L  Hip flexion L2 5/5 5/5  Knee extension L3 5/5 5/5  Ankle dorsiflexion L4 5/5 5/5  Toe extension L5 5/5 5/5  Ankle plantarflexion S1 5/5 5/5        Negative Pronator drift bilaterally     Sensory:   intact to light touch through out     DTRs: 2+ in bilateral  biceps  No clonus at bilateral ankles  Negative Lopez b/l      Tone: no spasticity noted     Coordination:   intact finger to nose bilaterally, but delayed with LUE   intact fine motor with fingers bilaterally      Labs: Reviewed and significant for   Recent Labs    09/14/24  0628 09/15/24  0710 24  RBC 4.38* 3.72* 4.10*  HEMOGLOBIN 12.8* 10.9* 12.4*  HEMATOCRIT 39.4* 33.6* 35.8*  PLATELETCT 141* 156* 184     Recent Labs    09/14/24  0628 09/15/24  0710 24  0444  SODIUM 143 142 140  POTASSIUM 4.6 3.8 4.2  CHLORIDE 110 108 105  CO2  24  GLUCOSE 165* 147* 166*  BUN 12 15 21  CREATININE 0.68 0.85 0.93  CALCIUM 8.6 7.9* 8.1*     Recent Results  Recent Results (from the past 24 hour(s))  EKG    Collection Time: 09/15/24  5:48 PM  Result Value Ref Range    Report          [facility]     Test Date:  2024-09-15  Pt Name:    JENNIFER COLEY                 Department: [facility]  MRN:        [id number]                      Room:       [facility]  Gender:     Male                         Technician: TORIE  :        1990                   Requested By:LAURE MARX  Order #:    [id number]                    Reading MD: Jeremy Fuentes MD     Measurements  Intervals                                Axis  Rate:       41                           P:          14  CT:         150                          QRS:        23  QRSD:       87                           T:          39  QT:         440  QTc:        364     Interpretive Statements  Sinus bradycardia  Compared to ECG 2024 18:36:52  Sinus tachycardia no longer present  Electronically  Signed On 09- 18:11:19 PDT by Jeremy Fuentes MD     CBC WITH DIFFERENTIAL    Collection Time: 09/16/24  4:44 AM  Result Value Ref Range    WBC 8.4 4.8 - 10.8 K/uL    RBC 4.10 (L) 4.70 - 6.10 M/uL    Hemoglobin 12.4 (L) 14.0 - 18.0 g/dL    Hematocrit 35.8 (L) 42.0 - 52.0 %    MCV 87.3 81.4 - 97.8 fL    MCH 30.2 27.0 - 33.0 pg    MCHC 34.6 32.3 - 36.5 g/dL    RDW 40.2 35.9 - 50.0 fL    Platelet Count 184 164 - 446 K/uL    MPV 10.1 9.0 - 12.9 fL    Neutrophils-Polys 85.90 (H) 44.00 - 72.00 %    Lymphocytes 8.30 (L) 22.00 - 41.00 %    Monocytes 4.40 0.00 - 13.40 %    Eosinophils 0.00 0.00 - 6.90 %    Basophils 0.20 0.00 - 1.80 %    Immature Granulocytes 1.20 (H) 0.00 - 0.90 %    Nucleated RBC 0.00 0.00 - 0.20 /100 WBC    Neutrophils (Absolute) 7.24 1.82 - 7.42 K/uL    Lymphs (Absolute) 0.70 (L) 1.00 - 4.80 K/uL    Monos (Absolute) 0.37 0.00 - 0.85 K/uL    Eos (Absolute) 0.00 0.00 - 0.51 K/uL    Baso (Absolute) 0.02 0.00 - 0.12 K/uL    Immature Granulocytes (abs) 0.10 0.00 - 0.11 K/uL    NRBC (Absolute) 0.00 K/uL  Comp Metabolic Panel    Collection Time: 09/16/24  4:44 AM  Result Value Ref Range    Sodium 140 135 - 145 mmol/L    Potassium 4.2 3.6 - 5.5 mmol/L    Chloride 105 96 - 112 mmol/L    Co2 24 20 - 33 mmol/L    Anion Gap 11.0 7.0 - 16.0    Glucose 166 (H) 65 - 99 mg/dL    Bun 21 8 - 22 mg/dL    Creatinine 0.93 0.50 - 1.40 mg/dL    Calcium 8.1 (L) 8.5 - 10.5 mg/dL    Correct Calcium 8.8 8.5 - 10.5 mg/dL    AST(SGOT) 34 12 - 45 U/L    ALT(SGPT) 47 2 - 50 U/L    Alkaline Phosphatase 39 30 - 99 U/L    Total Bilirubin 0.7 0.1 - 1.5 mg/dL    Albumin 3.1 (L) 3.2 - 4.9 g/dL    Total Protein 5.3 (L) 6.0 - 8.2 g/dL    Globulin 2.2 1.9 - 3.5 g/dL    A-G Ratio 1.4 g/dL  MAGNESIUM    Collection Time: 09/16/24  4:44 AM  Result Value Ref Range    Magnesium 2.2 1.5 - 2.5 mg/dL  PHOSPHORUS    Collection Time: 09/16/24  4:44 AM  Result Value Ref Range    Phosphorus 3.1 2.5 - 4.5 mg/dL  ESTIMATED GFR    Collection Time:  09/16/24  4:44 AM  Result Value Ref Range    GFR (CKD-EPI) 110 >60 mL/min/1.73 m 2             ASSESSMENT:  Patient is a 34 y.o. male admitted with meningitis       [facility] Code / Diagnosis to Support: 0002.1 - Brain Dysfunction: Non-Traumatic  See DISPO details below for recommendations on appropriate level of rehab for this diagnosis.     Barriers to transfer include: Insurance authorization, TCCs to verify disposition, medical clearance and bed availability      Assessment and Plan:  Encephalopathy   Bacterial Meningitis   - admitted with HA and AMS   - LP noted to have mild CSF   - CSF with elevated WBC and protein   - PCR + for strep pneumonia   - MRI brain with diffuse hyperintense signal with subarachnoid spaces consistent with meningitis   - ID consulted   - on Penicillin G q4hrs , stop date 9/27   - continue with PT/OT/SLP      Respiratory failure   - intubated for AMS   - 9/14 extubated   - weaned down to 2 L      Hyperglycemia   - BG elevated up to 160  - likely secondary to dexamethasone         DISPO:  - patient is currently functioning below their level of baseline, recommend post acute rehab  - anticipated to be appropriate IRF level therapy with 3hr of therapy 5 days per week   - prior to acceptance to IRF, will need insurance auth from [facility] and updated therapy notes    - TCC to assist with insurance auth and DC support from wife          Medical Complexity:  Encephalopathy   Meningitis   Respiratory failure   Hyperglycemia   Impaired mobility and ADLs         DVT PPX: Lovenox         Thank you for allowing us to participate in the care of this patient.      Patient was seen for >80 minutes on unit/floor of which > 50% of time was spent on counseling and coordination of care regarding the above, including prognosis, risk reduction, benefits of treatment, and options for next stage of care.     Aleah Lee D.O.   Physical Medicine and Rehabilitation             Hospital Medicine Consultation      Date of Service  9/15/2024     Referring Physician  Dr. Jeremy Dowell MD     Consulting Physician  Martin Fuller M.D.     Reason for Consultation  Hospital medicine consultation requested patient admitted with bacterial meningitis     History of Presenting Illness  34 y.o. male who presented 9/12/2024 with no significant past medical history, the patient is an active [employer] , presents as a transfer from [facility] on 9/12/2024 with altered mental status, the patient presented to the sending facility with headaches, he was nonverbal at the time, GCS of 9, there were no focal deficits, the patient was unable to follow commands, a CT a CTA of the head showed some diffuse edema bilateral in the hemispheres, he was transferred here for further neurological evaluation, on arrival the patient required intubation, he underwent a lumbar puncture with milky appearing CSF, PCR showed strep pneumo with greater than 15,000 white cell count, greater than 600 protein on CSF, the patient was admitted to ICU, started on vancomycin, ceftriaxone, steroids, MRI brain performed without evidence of venous thrombosis, infectious disease consulted, the patient was transition to high-dose penicillin, on 9/15 the patient overall improved, his Tmax was 100.6, he was in sinus bradycardia,'s blood pressure is within normal limits, the patient was successfully extubated on 9/14, his temperature now is improved to 99.7, consultation from internal medicine, hospital service is requested to transfer patient out of the ICU.  Family is at the bedside, updated on the patient's progress.     Review of Systems  Review of Systems   Constitutional:  Positive for malaise/fatigue.   HENT: Negative.     Eyes: Negative.    Respiratory: Negative.  Negative for cough.    Cardiovascular: Negative.  Negative for chest pain.   Gastrointestinal: Negative.    Genitourinary: Negative.    Musculoskeletal: Negative.    Skin:  "Negative.    Neurological:  Positive for dizziness and headaches.   Endo/Heme/Allergies: Negative.    Psychiatric/Behavioral:  The patient is nervous/anxious.    All other systems reviewed and are negative.        Past Medical History  No reported past medical history     Surgical History  No reported surgical history     Family History  No reported family history     Social History   reports that he has never smoked. He has never used smokeless tobacco.  The patient is , young baby  Employed in the , [employer]      Medications  Prior to Admission Medications  Prescriptions Last Dose Informant Patient Reported? Taking?  Melatonin 5 MG Chew Tab 1~2 DAYS AGO at HS Significant Other Yes Yes  Sig: Chew 10 mg at bedtime as needed (Sleep). 2 tablets = 10 mg.  Non Formulary Request 9/10/2024 at [facility] Significant Other Yes Yes  Sig: Take 1 Capsule by mouth every day. \"Alpilean Weight Loss Support\"  Contains vitamin B-12, chromium, turmeric rhizome, African mariana seed, ginger rhizome, moringa leaf, citrus bioflavonoids, fucoxanthin  Indications: Dietary Supplement  acetaminophen (TYLENOL) 500 MG Tab 9/12/2024 at 0900 Significant Other Yes Yes  Sig: Take 1,000 mg by mouth every 6 hours as needed (Headache). 2 tablets = 1,000 mg.  ibuprofen (ADVIL) 200 MG Tab 9/12/2024 at 0745 Significant Other Yes Yes  Sig: Take 400 mg by mouth every 6 hours as needed for Headache. 2 tablets = 400 mg.    Facility-Administered Medications: None        Allergies  Allergies  No Known Allergies       Physical Exam  Temp:  [36.1 °C (97 °F)-36.3 °C (97.3 °F)] 36.1 °C (97 °F)  Pulse:  [39-65] 43  Resp:  [10-26] 15  BP: (109-178)/(58-86) 124/74  SpO2:  [92 %-100 %] 97 %     Physical Exam  Vitals and nursing note reviewed.   Constitutional:       Appearance: He is well-developed. He is ill-appearing.   HENT:      Head: Normocephalic.   Eyes:      Pupils: Pupils are equal, round, and reactive to light.   Cardiovascular:      Rate " and Rhythm: Normal rate and regular rhythm.      Heart sounds: Normal heart sounds.   Pulmonary:      Effort: Pulmonary effort is normal.   Abdominal:      General: Bowel sounds are normal.      Palpations: Abdomen is soft.   Genitourinary:     Penis: Normal.       Rectum: Normal.   Musculoskeletal:         General: Normal range of motion.      Cervical back: Normal range of motion.   Skin:     General: Skin is warm.   Neurological:      Mental Status: He is alert and oriented to person, place, and time.      Motor: Weakness present.            Fluids  Date 09/15/24 0700 - 09/16/24 0659  Shift 5929-5793 5396-5740 3679-3381 24 Hour Total  INTAKE  P.O. 400     400  I.V. 184.6     184.6  IV Piggyback 339.1     339.1  Shift Total 923.7     923.7  OUTPUT  Urine 325     325  Shift Total 325     325  Weight (kg) 85.6 85.6 85.6 85.6        Laboratory  Recent Labs    09/13/24  0805 09/14/24  0628 09/15/24  0710  WBC 9.4 10.6 8.6  RBC 4.52* 4.38* 3.72*  HEMOGLOBIN 13.4* 12.8* 10.9*  HEMATOCRIT 39.0* 39.4* 33.6*  MCV 86.3 90.0 90.3  MCH 29.6 29.2 29.3  MCHC 34.4 32.5 32.4  RDW 40.3 43.8 43.1  PLATELETCT 140* 141* 156*  MPV 9.4 10.2 10.1     Recent Labs    09/13/24  0805 09/14/24  0628 09/15/24  0710  SODIUM 137 143 142  POTASSIUM 4.4 4.6 3.8  CHLORIDE 104 110 108  CO2 20 20 24  GLUCOSE 165* 165* 147*  BUN 9 12 15  CREATININE 0.84 0.68 0.85  CALCIUM 8.4* 8.6 7.9*     Recent Labs    09/12/24  1803  INR 1.00         Recent Labs    09/12/24  1803 09/14/24  0628  TRIGLYCERIDE 47 179*        Imaging  MR-LUMBAR SPINE-WITH & W/O  Final Result     1.  T2 hypointense fluid-fluid level in the dependent caudal thecal sac in keeping with the history of meningitis. This likely represents intradural exudate in the setting of meningitis. Dependently layering subarachnoid hemorrhage within the thecal sac   could have a similar appearance.  2.  L4-5 tiny midline disc bulge with underlying tiny annular fissure. No central or foraminal  "stenosis.  3.  L5-S1 large left paramedian disc protrusion/extrusion with marked compromise of the emerging left S1 nerve root. Mild right foraminal stenosis. Moderate left foraminal stenosis.     MR-THORACIC SPINE-WITH & W/O  Final Result     MRI OF THE THORACIC SPINE WITHOUT AND WITH CONTRAST WITHIN NORMAL LIMITS.     MR-CERVICAL SPINE-WITH & W/O  Final Result     1.  C5-6 minimal midline disc/osteophyte complex. No central or foraminal stenosis.  2.  C6-C7 moderate-marked right foraminal stenosis due to spondylotic change.  3.  No myelopathic cord signal. Concerning the history of meningitis, no abnormal intradural extra medullary enhancement.     MR-BRAIN-WITH & W/O  Final Result     1.  The FLAIR images demonstrates diffuse hyperintense signal within the subarachnoid spaces. There is also minimal abnormal ependymal T2 hyperintensity. There is no hydrocephalus. There is no abnormal contrast enhancement. This finding is concerning for   acute Meningitis/meningeal inflammation. Please correlate with the CSF study.  2.  Mild tonsillar ectopia.     MR-VENOGRAM (MRV) HEAD  Final Result     Unremarkable MR venogram of the cerebral veins.     DX-CHEST-PORTABLE (1 VIEW)  Final Result        No acute cardiac or pulmonary abnormality is identified.     IR-MIDLINE CATHETER INSERTION WO GUIDANCE > AGE 3    (Results Pending)        Assessment/Plan  * Streptococcus pneumoniae meningitis- (present on admission)  Assessment & Plan  -presented with AMS and headaches  -LP done in ED \"milky\" showed greater than 15,000 WBC and greater than 600 protein  -PCR positive Streptococcus pneumoniae  -ID consulted, transition to penicillin IV every 4 hours, end date 9/27  -Serial neurologic exams  Close monitor     Bradycardia  Assessment & Plan  -sinus, with episodes of junctional rhythm, optimize electrolytes  -asymptomatic  -hemodynamically stable  -telemetry     Lumbar disc herniation  Assessment & Plan  -Patient complained of back pain " prior to presentation  -MRI showed disc herniation L5-S1  -Multimodal pain management  -Outpatient neurosurgery follow-up     Acute encephalopathy  Assessment & Plan  -Due to bacterial meningitis  -Keep patient awake during the day and avoid daytime naps. Remove all unnecessary lines (central lines, peripheral IVs, feeding tubes, ho catheters).  -Avoid polypharmacy, frequent re-orientation, maximize family time at bedside, use glasses and hearing aids if needed, treat pain, encourage ambulation, minimize benzos/anticholinergic agents.   -Aspiration precautions  -Seizure precautions  -Fall precautions     Sepsis due to Streptococcus pneumoniae with encephalopathy without septic shock (HCC)  Assessment & Plan  This is Sepsis Present on admission  SIRS criteria identified on my evaluation include: Tachycardia, with heart rate greater than 90 BPM  Clinical indicators of end organ dysfunction include Toxic Metabolic Encephalopathy and GCS < 15  Source is Bacterial Meningitis  Sepsis protocol initiated  Crystalloid Fluid Administration: Resuscitation volume of 0 ordered. Reason that resuscitation volume of less than 30ml/kg was ordered  His hemodynamics improved with intubation and he does not require fluids at this time.  PRN 30cc/kg LR bolus ordered for MAP <65 or SBP <90  IV antibiotics as appropriate for source of sepsis  Reassessment: I have reassessed the patient's hemodynamic status     Continue ABX per ID recommendations.  Continue steroids  Follow cultures  Hemodynamically stable off pressors     On mechanically assisted ventilation (HCC)  Assessment & Plan  -Intubated for altered mental status  -Intubation date 9/12  -extubated 9/14  Continue pulmonary toilet, oxygen weaning as tolerated     Plan  9/15  Continue steroids as currently ordered, end date 9/16  Ongoing antibiotics, high-dose, every 4 hours until 9/27, ID following  Supportive care  Symptomatic care, headache treatment  PT OT  Mobilization as  tolerated  See orders  Patient is has a high medical complexity, complex decision making and is at high risk for complication, morbidity, and mortality.  I spent 62 minutes, reviewing the chart, obtaining and/or reviewing separately obtained history. Performing a medically appropriate examination and evaluation.  Counseling and educating the patient. Ordering and reviewing medications, tests, or procedures.   Documenting clinical information in EPIC. Independently interpreting results and communicating results to patient. Discussing future disposition of care with patient, RN and case management.  Thank you for consulting with us, we will follow closely while the patient is hospitalized          INFECTIOUS DISEASES INPATIENT CONSULT NOTE     Date of Service: 9/13/2024     Consult Requested By: JUDY Real     Reason for Consultation: Streptococcus pneumoniae meningitis     History of Present Illness:   Kwesi Rivera is a 34 y.o. man  with no prior medical history admitted 9/12/2024 when outside hospital secondary to altered mentation.  Extensive review of emergency physician notes, hospital medicine notes and consultant notes performed.  Patient is currently intubated.  History obtained from wife at bedside.  Patient had been doing well until earlier on the day of admission when he developed a severe headache.  His symptoms worsened throughout the day and he had a few episodes of emesis.  There was no report of fevers or chills.  No prior upper respiratory infections.  No ill contacts.  Patient had been complaining of a persistent headache for approximately 1 day prior to admission.  Wife states that he was also complaining of some back pain.  He went to the chiropractor several days prior with some improvement.  He initially presented to [facility] emergency department and was found to be nonverbal with a GCS of roughly 9.  There were no focal deficits however he was unable to follow  commands.  Patient transferred to [facility] for higher level of care.  Patient underwent a lumbar puncture on 9/12 with WBC greater than 15,000, 99% polys, 3 glucose and greater than 600 protein consistent with bacterial meningitis.  Meningitis/encephalitis panel PCR was positive for Streptococcus pneumoniae.  CSF cultures also positive for Streptococcus pneumoniae.  Blood cultures on admission are negative to date.  Patient is currently on IV ceftriaxone and vancomycin.  Infectious disease service consulted for recommendations.        Unable to obtain a full review of systems as patient intubated     PMH:   Unable to obtain     PSH:  Unable to obtain     FAMILY HX:  Unable to obtain     SOCIAL HX:  Social History         Socioeconomic History   Marital status: Single      Spouse name: Not on file   Number of children: Not on file   Years of education: Not on file   Highest education level: Bachelor's degree (e.g., BA, AB, BS)  Occupational History   Not on file  Tobacco Use   Smoking status: Never   Smokeless tobacco: Never  Substance and Sexual Activity   Alcohol use: Not on file   Drug use: Not on file   Sexual activity: Not on file  Other Topics Concern   Not on file  Social History Narrative   Not on file       Social Determinants of Health       Financial Resource Strain: Low Risk  (7/31/2022)    Overall Financial Resource Strain (CARDIA)     Difficulty of Paying Living Expenses: Not hard at all  Food Insecurity: No Food Insecurity (7/31/2022)    Hunger Vital Sign     Worried About Running Out of Food in the Last Year: Never true     Ran Out of Food in the Last Year: Never true  Transportation Needs: No Transportation Needs (7/31/2022)    PRAPARE - Transportation     Lack of Transportation (Medical): No     Lack of Transportation (Non-Medical): No  Physical Activity: Sufficiently Active (7/31/2022)    Exercise Vital Sign     Days of Exercise per Week: 5 days     Minutes of Exercise per Session: 60  min  Stress: No Stress Concern Present (7/31/2022)    [employer] - Occupational Stress Questionnaire     Feeling of Stress : Not at all  Social Connections: Moderately Integrated (7/31/2022)    Social Connection and Isolation Panel [NHANES]     Frequency of Communication with Friends and Family: More than three times a week     Frequency of Social Gatherings with Friends and Family: Once a week     Attends Mandaeism Services: More than 4 times per year     Active Member of Clubs or Organizations: No     Attends Club or Organization Meetings: Never     Marital Status: Living with partner  Intimate Partner Violence: Not on file  Housing Stability: Low Risk  (7/31/2022)    Housing Stability Vital Sign     Unable to Pay for Housing in the Last Year: No     Number of Places Lived in the Last Year: 1     Unstable Housing in the Last Year: No       Tobacco Use History  Social History       Tobacco Use  Smoking Status Never  Smokeless Tobacco Never       Social History     Substance and Sexual Activity  Alcohol Use None        Allergies/Intolerances:  Allergies  No Known Allergies          Other Current Medications:    Current Medications     Current Facility-Administered Medications:     MD Alert...Vancomycin per Pharmacy, , Other, PHARMACY TO DOSE, Mauri Bowser M.D.    cefTRIAXone (Rocephin) syringe 2,000 mg, 2,000 mg, Intravenous, BID, Mauri Bowser M.D.    fentaNYL (Sublimaze) injection 50 mcg, 50 mcg, Intravenous, Q15 MIN PRN, 50 mcg at 09/13/24 0532 **AND** fentaNYL (Sublimaze) injection 100 mcg, 100 mcg, Intravenous, Q15 MIN PRN **AND** fentaNYL (SUBLIMAZE) 50 mcg/mL in 50mL (Continuous Infusion), , Intravenous, Continuous, Last Rate: 2 mL/hr at 09/13/24 0732, 100 mcg/hr at 09/13/24 0732 **AND** propofol (DIPRIVAN) injection, 0-80 mcg/kg/min (Ideal), Intravenous, Continuous, Last Rate: 11.9 mL/hr at 09/13/24 0334, 30 mcg/kg/min at 09/13/24 0334 **AND** [START ON 9/14/2024]  Triglyceride, , , Every 3 Days (0300), Mauri Bowser M.D.    Respiratory Therapy Consult, , Nebulization, Continuous RT, Mauri Bowser M.D.    acetaminophen (Tylenol) tablet 650 mg, 650 mg, Enteral Tube, Q6HRS PRN, Mauri Bowser M.D., 650 mg at 09/13/24 0458    senna-docusate (Pericolace Or Senokot S) 8.6-50 MG per tablet 2 Tablet, 2 Tablet, Enteral Tube, BID **AND** polyethylene glycol/lytes (Miralax) Packet 1 Packet, 1 Packet, Enteral Tube, QDAY PRN, Mauri Bowser M.D.    hydrALAZINE (Apresoline) injection 10 mg, 10 mg, Intravenous, Q4HRS PRN, Mauri Bowser M.D.    labetalol (Normodyne/Trandate) injection 10 mg, 10 mg, Intravenous, Q4HRS PRN, Mauri Bowser M.D.    famotidine (Pepcid) tablet 20 mg, 20 mg, Enteral Tube, Q12HRS **OR** famotidine (Pepcid) injection 20 mg, 20 mg, Intravenous, Q12HRS, Mauri Bowser M.D. MD Alert...ICU Electrolyte Replacement per Pharmacy, , Other, PHARMACY TO DOSE, Mauri Bowser M.D.    lidocaine (Xylocaine) 1 % injection 2 mL, 2 mL, Tracheal Tube, Q30 MIN PRN, Mauri Bowser M.D.    dexamethasone (Decadron) injection 10 mg, 10 mg, Intravenous, Q6HRS, Mauri Bowser M.D., 10 mg at 09/13/24 0242    vancomycin (Vancocin) 1,250 mg in  mL IVPB, 1,250 mg, Intravenous, Q8HR, Mauri Bowser M.D., Last Rate: 125 mL/hr at 09/13/24 0510, 1,250 mg at 09/13/24 0510    insulin regular (HumuLIN R,NovoLIN R) injection, 2-9 Units, Subcutaneous, Q6HRS, 2 Units at 09/13/24 0105 **AND** POC blood glucose manual result, , , Q6H **AND** NOTIFY MD and PharmD, , , Once **AND** Administer 20 grams of glucose (approximately 8 ounces of fruit juice) every 15 minutes PRN FSBG less than 70 mg/dL, , , PRN **AND** dextrose 50% (D50W) injection 25 g, 25 g, Intravenous, Q15 MIN PRN, Yeni Garcia    lactated ringers infusion, , Intravenous, Continuous, Yeni L. Latearl, Last Rate: 100 mL/hr at 09/12/24 2311, New Bag at 09/12/24 2311    ondansetron (Zofran) syringe/vial injection 4 mg, 4 mg,  "Intravenous, Q6HRS PRN, Yeni Garcia    enoxaparin (Lovenox) inj 40 mg, 40 mg, Subcutaneous, DAILY AT 1800, Mauri Bowser M.D., 40 mg at 24 0107    LR (Bolus) infusion 2,382 mL, 30 mL/kg, Intravenous, Once PRN, Mauri Bowser M.D.    [unfilled]     Most Recent Vital Signs:  /69   Pulse 64   Temp 36.2 °C (97.1 °F) (Temporal)   Resp 20   Ht 1.753 m (5' 9\")   Wt 81.6 kg (179 lb 14.3 oz)   SpO2 95%   BMI 26.57 kg/m²   Temp  Av.9 °C (98.4 °F)  Min: 36.2 °C (97.1 °F)  Max: 37.6 °C (99.7 °F)     Physical Exam:  General: well nourished, no diaphoresis, well-appearing, no acute distress  HEENT: Eyes closed, ET tube in place   neck: supple, no lymphadenopathy  Chest: CTAB, no rales, rhonchi or wheezes, normal work of breathing.  Cardiac: regular rate and rhythm, normal S1 S2, no murmurs, rubs or gallops  Abdomen: + bowel sounds, soft, non-tender, non-distended, no hepatosplenomegaly  Extremities: WWP, no edema, 2+ pedal pulses  Skin: warm and dry, no rashes or worrisome lesions  Neuro: Sedated.  Not following commands per bedside RN   psych: Unable to assess     Pertinent Lab Results:  Recent Labs    24  1803  WBC 8.8     Recent Labs    24  1803  HEMOGLOBIN 14.6  HEMATOCRIT 43.6  MCV 87.7  MCH 29.4  PLATELETCT 178           Recent Labs    24  1803  SODIUM 136  POTASSIUM 3.8  CHLORIDE 101  CO2 18*  CREATININE 0.92        Recent Labs    24  1803  ALBUMIN 4.2        Pertinent Micro:  Results        Procedure Component Value Units Date/Time    CSF CULTURE [[id number]]  (Abnormal) Collected: 24    Order Status: Completed Specimen: CSF from Tap Updated: 24      Significant Indicator POS      Source CSF      Site TAP      Culture Result -      Gram Stain Result Many WBCs.  Moderate Gram positive cocci.        Culture Result Streptococcus pneumoniae  Moderate growth      Narrative:      ICC tel. [phone] 2024, 07:13, RB PERF. RESULTS CALLED " TO:[phone]  ER tel.  09/12/2024, 20:50, RB PERF. RESULTS CALLED TO: Hallie GILLIAM [phone]    BLOOD CULTURE [[id number]] Collected: 09/12/24 2220    Order Status: Completed Specimen: Blood from Peripheral Updated: 09/13/24 0008      Significant Indicator NEG      Source BLD      Site PERIPHERAL      Culture Result No Growth  Note: Blood cultures are incubated for 5 days and  are monitored continuously.Positive blood cultures  are called to the RN and reported as soon as  they are identified.       BLOOD CULTURE [[id number]] Collected: 09/12/24 2205    Order Status: Completed Specimen: Blood from Peripheral Updated: 09/13/24 0008      Significant Indicator NEG      Source BLD      Site PERIPHERAL      Culture Result No Growth  Note: Blood cultures are incubated for 5 days and  are monitored continuously.Positive blood cultures  are called to the RN and reported as soon as  they are identified.       MRSA By PCR (Amp) [[id number]] Collected: 09/12/24 2205    Order Status: Sent Specimen: Respirate from Nares Updated: 09/12/24 2219    GRAM STAIN [[id number]]  (Abnormal) Collected: 09/12/24 1946    Order Status: Completed Specimen: CSF Updated: 09/12/24 2051      Significant Indicator .      Source CSF      Site TAP      Gram Stain Result Many WBCs.  Moderate Gram positive cocci.      Narrative:      ER tel.  09/12/2024, 20:50, RB PERF. RESULTS CALLED TO: Hallie GILLIAM [phone]    URINALYSIS [[id number]]  (Abnormal) Collected: 09/12/24 1843    Order Status: Completed Specimen: Urine Updated: 09/12/24 1918      Color Orange      Character Clear      Specific Gravity >=1.045      Ph 5.0      Glucose 500 mg/dL        Ketones 80 mg/dL        Protein Negative mg/dL        Bilirubin Negative      Urobilinogen, Urine 0.2      Nitrite Negative      Leukocyte Esterase Negative      Occult Blood Moderate      Micro Urine Req Microscopic    URINALYSIS [[id number]]      Order Status: Canceled Specimen: Urine             No results found for:  "\"BLOODCULTU\", \"BLDCULT\", \"BCHOLD\"      Studies:  DX-CHEST-PORTABLE (1 VIEW)     Result Date: 9/12/2024 9/12/2024 6:36 PM HISTORY/REASON FOR EXAM:  Shortness of Breath TECHNIQUE/EXAM DESCRIPTION AND NUMBER OF VIEWS: Single portable view of the chest. COMPARISON: None FINDINGS: Tracheal tube tip is below the level of the clavicles. NG tube is in the proximal stomach. Heart size is within normal limits. No focal infiltrates or consolidations are identified in the lungs. No pleural fluid collections are identified. No pneumothorax is appreciated.      No acute cardiac or pulmonary abnormality is identified.        IMPRESSION:  1.  Streptococcus pneumoniae meningitis       2.  Ventilator dependent respiratory failure  3.  Acute encephalopathy, secondary to above  4.  Back pain, prior to admission     PLAN:  Kwesi Rivera is a 34 y.o. man with no prior medical history admitted from [facility] secondary to acute encephalopathy and headache.  Lumbar puncture on 9/12 consistent with bacterial meningitis with both CSF cultures and meningitis/encephalitis PCR panel positive for Streptococcus pneumoniae.  Blood cultures on admission are negative to date     -Continue IV vancomycin and IV ceftriaxone 2 g every 12 hours pending strep pneumoniae susceptibilities  -Monitor renal function and Vanco trough  -CSF cultures+ Streptococcus pneumonia.  Follow susceptibilities.  Also requested susceptibility to levofloxacin  -Continue dexamethasone for 4 days  -Continue to monitor neurologic status  -According to the patient's wife, patient was complaining of back pain prior to admission.  When able, will need to assess for any ongoing back pain that may require imaging     This infection poses a threat to life and further neurologic function     Disposition: TBD     Need for PICC line: TBD     Plan of care discussed with intensivist, Dr. Dowell, ICU APRN, Tamar Jc and wife at bedside. Will continue to follow     Briana CORREA" KRUNAL Chen            Neurohospitalist Service, [facility]     Referring Physician: Roque Albarado D.O.     Reason for Consultation   Chief Complaint  Patient presents with   ALOC           HPI: Kwesi Rivera is a 34 y.o. male with no significant past medical history who was transferred from [facility] after he presented with 1 day history of severe headache associated with nausea and vomiting with subsequent alteration of mental status and agitation.  Patient is not able to provide any history and is nonverbal at this time.  He seems agitated and maintain fetal position while rubbing his head.  According to his wife he was complaining of headache for 1 day and apparently he had an episode of vomiting earlier today and took a nap at around noon, when he woke up at around 2 PM he was altered with reported GCS of 10.  He underwent a brain CT which did not reveal acute abnormalities although in my review of CT there is some questionable diffuse edema in both hemisphere.  CTA of the head is suboptimal but grossly does not show any large vessel occlusion or flow-limiting stenosis.     Review of systems: In addition to what is detailed in the HPI above, all other systems reviewed and are negative.     Past Medical History:    has no past medical history on file.     FHx:  family history is not on file.     SHx:   reports that he has never smoked. He has never used smokeless tobacco.     Allergies:  Allergies  No Known Allergies       Medications:    Current Medications     Current Facility-Administered Medications:     propofol (DIPRIVAN) injection, 0-80 mcg/kg/min (Ideal), Intravenous, Continuous, Last Rate: 19.8 mL/hr at 09/12/24 1847, 50 mcg/kg/min at 09/12/24 1847 **AND** Triglycerides Starting now and then Every 3 Days, , , Every 3 Days (0300), Roque Albarado D.O.    LORazepam (Ativan) injection 2 mg, 2 mg, Intravenous, Once, Roque Albarado D.O.     Current  "Outpatient Medications:     Melatonin 5 MG Chew Tab, Chew 10 mg at bedtime as needed (Sleep). 2 tablets = 10 mg., Disp: , Rfl:     ibuprofen (ADVIL) 200 MG Tab, Take 400 mg by mouth every 6 hours as needed for Headache. 2 tablets = 400 mg., Disp: , Rfl:     acetaminophen (TYLENOL) 500 MG Tab, Take 1,000 mg by mouth every 6 hours as needed (Headache). 2 tablets = 1,000 mg., Disp: , Rfl:     Non Formulary Request, Take 1 Capsule by mouth every day. \"Alpilean Weight Loss Support\" Contains vitamin B-12, chromium, turmeric rhizome, African mariana seed, ginger rhizome, moringa leaf, citrus bioflavonoids, fucoxanthin  Indications: Dietary Supplement, Disp: , Rfl:        Physical Examination:     Vitals  Vitals:    09/12/24 1831 09/12/24 1839 09/12/24 1848 09/12/24 1851  BP: (!) 187/104 (!) 203/104 (!) 185/97 (!) 178/90  Pulse: (!) 117 (!) 106 (!) 103 (!) 107  Resp: 19 20 (!) 29 (!) 28  Temp:          TempSrc:          SpO2: 99% 99% 98% 98%  Weight:          Height:                  General:   Patient is awake but restless and nonverbal.  He is not able to follow commands.  He is in fetal position and sometimes lays on his stomach.  Neck: Full range of motion  Eyes: Midline, Pupils reactive to light.  CV: RRR  Lungs: No respiratory distress  Extremities: No cyanosis, warm, no significant edema.     NEUROLOGICAL EXAM:   Mental status: Awake, somewhat agitated, restless and nonverbal.  He is not able to follow commands.  Speech and language: He is nonverbal cranial nerve exam: Pupils are equal, round and reactive to light bilaterally. Visual fields cannot be tested.  Face is symmetric. Sensation in the face is difficult to assess.  Motor exam: He moves all 4 extremities strongly but not to command.  Sensory exam: Unable to assess  Coordination: Unable to assess  Plantar reflexes: Equivocal  Gait: deferred         Objective Data:     Labs:  Lab Results  Component Value Date/Time    PROTHROMBTM 13.3 09/12/2024 06:03 PM   " " INR 1.00 09/12/2024 06:03 PM     Lab Results  Component Value Date/Time    WBC 8.8 09/12/2024 06:03 PM    RBC 4.97 09/12/2024 06:03 PM    HEMOGLOBIN 14.6 09/12/2024 06:03 PM    HEMATOCRIT 43.6 09/12/2024 06:03 PM    MCV 87.7 09/12/2024 06:03 PM    MCH 29.4 09/12/2024 06:03 PM    MCHC 33.5 09/12/2024 06:03 PM    MPV 9.0 09/12/2024 06:03 PM    NEUTSPOLYS 93.10 (H) 09/12/2024 06:03 PM    LYMPHOCYTES 3.10 (L) 09/12/2024 06:03 PM    MONOCYTES 3.30 09/12/2024 06:03 PM    EOSINOPHILS 0.00 09/12/2024 06:03 PM    BASOPHILS 0.20 09/12/2024 06:03 PM     Lab Results  Component Value Date/Time    SODIUM 136 09/12/2024 06:03 PM    POTASSIUM 3.8 09/12/2024 06:03 PM    CHLORIDE 101 09/12/2024 06:03 PM    CO2 18 (L) 09/12/2024 06:03 PM    GLUCOSE 192 (H) 09/12/2024 06:03 PM    BUN 12 09/12/2024 06:03 PM    CREATININE 0.92 09/12/2024 06:03 PM     No results found for: \"CHOLSTRLTOT\", \"LDL\", \"HDL\", \"TRIGLYCERIDE\"    Lab Results  Component Value Date/Time    ALKPHOSPHAT 39 09/12/2024 06:03 PM    ASTSGOT 21 09/12/2024 06:03 PM    ALTSGPT 16 09/12/2024 06:03 PM    TBILIRUBIN 1.2 09/12/2024 06:03 PM        Imaging/Testing:     I interpreted and/or reviewed the patient's neuroimaging     MR-BRAIN-WITH & W/O    (Results Pending)  MR-VENOGRAM (MRV) HEAD    (Results Pending)  DX-CHEST-PORTABLE (1 VIEW)    (Results Pending)        Assessment/plan:  Kwesi Rivera is a 34 y.o. male with no significant past medical history who was transferred from [facility] after he presented with 1 day history of severe headache associated with nausea and vomiting with subsequent alteration of mental status and agitation.  Patient is not able to provide any history and is nonverbal at this time.  He seems agitated and maintain fetal position while rubbing his head.  According to his wife he was complaining of headache for 1 day and apparently he had an episode of vomiting earlier today and took a nap at around noon, when he woke up at around 2 PM " he was altered with reported GCS of 10.  He underwent a brain CT which did not reveal acute abnormalities although in my review of CT there is some questionable diffuse edema in both hemisphere.  CTA of the head is suboptimal but grossly does not show any large vessel occlusion or flow-limiting stenosis.  Urine drug screen reportedly negative.  I am concerned for sinus thrombosis for which  will obtain brain MRI with and without contrast and MRV of the head.  Subarachnoid hemorrhage is not totally excluded for which I would recommend spinal tap to assess for blood and also rule out meningoencephalitis.  Patient is restless and agitated and in order to obtain requested diagnostic test as well as airway protection we will proceed with temporary intubation.  He will be admitted to intensive care unit for close neuromonitoring.  Maintain normal blood pressure.        Upon my evaluation, this patient had a high probability of imminent or life-threatening deterioration due to encephalopathy and alteration of mental status which required my direct attention, intervention, and personal management.  I personally provided 55 minutes of total critical care time. Time includes: review of laboratory data, review of radiology studies, discussion with consultants, discussion with family/patient, monitoring for potential decompensation.  Interventions were performed as documented in the chart.      The plan of care above has been discussed with Roque Albarado D.O.        Please note that this dictation was created using voice recognition software. I have made every reasonable attempt to correct obvious errors, but I expect that there are errors of grammar and possibly content that I did not discover before finalizing the note.        Luz Medina MD  Acute Care Neurology Services                Current Vital Signs:   Temperature: 36.6 °C (97.9 °F) Pulse: (!) 46 Respiration: 18 Blood Pressure: 125/69  Weight: 85.6 kg (188  "lb 11.4 oz) Height: 175.3 cm (5' 9\")  Pulse Oximetry: 95 % O2 (LPM): 2      Completed Laboratory Reports:  Recent Labs     09/14/24  0628 09/15/24  0710 09/16/24  0444   WBC 10.6 8.6 8.4   HEMOGLOBIN 12.8* 10.9* 12.4*   HEMATOCRIT 39.4* 33.6* 35.8*   PLATELETCT 141* 156* 184   SODIUM 143 142 140   POTASSIUM 4.6 3.8 4.2   BUN 12 15 21   CREATININE 0.68 0.85 0.93   ALBUMIN 3.3 2.7* 3.1*   GLUCOSE 165* 147* 166*     Additional Labs: Not Applicable    Prior Living Situation:   Housing / Facility: Unable To Determine At This Time    Prior Level of Function / Living Situation:   Physical Therapy: Prior Services: None  Housing / Facility: Unable To Determine At This Time  Bed Mobility: Independent  Transfer Status: Independent  Ambulation: Independent  Assistive Devices Used: None  Stairs: Independent  Current Level of Function:   Gait Level Of Assist: Unable to Participate  Rolling: Contact Guard Assist  Comments: Able to roll self from L sidelying-supine, supine-R sidelying; Able to bring his LE OOB, however upon attempt to bring his trunk upright, he became impulsive, restless and required to be returned back to supine for safety. RN was alerted, his sedation was turned back on.  Sit to Stand: Unable to Participate  Bed, Chair, Wheelchair Transfer: Unable to Participate     Occupational Therapy:      Prior Services: None  Housing / Facility: Unable To Determine At This Time  Current Level of Function:   Upper Body Dressing: Total Assist  Lower Body Dressing: Total Assist  Speech Language Pathology:      Rehabilitation Prognosis/Potential: Good  Estimated Length of Stay: 14-21 days    Nursing:      Continent    Scope/Intensity of Services Recommended:  Physical Therapy: 1.5 hr / day  5 days / week. Therapeutic Interventions Required: Maximize Endurance, Mobility, Strength, and Safety  Occupational Therapy: 1.5 hr / day 5 days / week. Therapeutic Interventions Required: Maximize Self Care, ADLs, IADLs, and Energy " Conservation  Rehabilitation Nursin/7. Therapeutic Interventions Required: Monitor Pain, Skin, Vital Signs, Intake and Output, Labs, Safety, Aspiration Risk, and Family Training  Rehabilitation Physician: 3 - 5 days / week. Therapeutic Interventions Required: Medical Management  Respiratory Care: consult. Therapeutic Interventions Required: Pulmonary Toileting  Dietician: consult. Therapeutic Interventions Required: Please advise on a diet that is both healthy and promotes healing     He requires 24-hour rehabilitation nursing to manage bowel and bladder function, skin care, nutrition and fluid intake, pulmonary hygiene, pain control, safety, medication management, and patient/family goals. In addition, rehabilitation nursing will reiterate and reinforce therapy skills and equipment use, including ADLs, as well as provide education to the patient and family. Kwesi Rivera is willing to participate in and is able to tolerate the proposed plan of care.    Rehabilitation Goals and Plan (Expected frequency & duration of treatment in the IRF):   Return to the Community and Family Able to Provide  Assistance  Anticipated Date of Rehabilitation Admission: unknown  Patient/Family oriented IRF level of care/facility/plan: Yes  Patient/Family willing to participate in IRF care/facility/plan: Yes  Patient able to tolerate IRF level of care proposed: Yes  Patient has potential to benefit IRF level of care proposed: Yes  Comments:       Special Needs or Precautions - Medical Necessity:  Safety Concerns/Precautions:  Balance  Diet:   DIET ORDERS (From admission to next 24h)       Start     Ordered    09/15/24 0911  Diet Order Diet: Regular  ALL MEALS        Question:  Diet:  Answer:  Regular    09/15/24 0910                    Anticipated Discharge Destination / Patient/Family Goal:  Destination: Home with Assistance Support System: Spouse and Family   Anticipated home health services: OT, PT, and Nursing  Previously used  HH service/ provider: Not Applicable  Anticipated DME Needs: Walker  Outpatient Services: OT and PT  Alternative resources to address additional identified needs:     Pre-Screen Completed: 9/16/2024 11:46 AM [facility].

## 2024-09-16 NOTE — CONSULTS
Hospital Medicine Consultation    Date of Service  9/15/2024    Referring Physician  Dr. Jeremy Dowell MD    Consulting Physician  Martin Fuller M.D.    Reason for Consultation  Hospital medicine consultation requested patient admitted with bacterial meningitis    History of Presenting Illness  34 y.o. male who presented 9/12/2024 with no significant past medical history, the patient is an active [employer] , presents as a transfer from [facility] on 9/12/2024 with altered mental status, the patient presented to the sending facility with headaches, he was nonverbal at the time, GCS of 9, there were no focal deficits, the patient was unable to follow commands, a CT a CTA of the head showed some diffuse edema bilateral in the hemispheres, he was transferred here for further neurological evaluation, on arrival the patient required intubation, he underwent a lumbar puncture with milky appearing CSF, PCR showed strep pneumo with greater than 15,000 white cell count, greater than 600 protein on CSF, the patient was admitted to ICU, started on vancomycin, ceftriaxone, steroids, MRI brain performed without evidence of venous thrombosis, infectious disease consulted, the patient was transition to high-dose penicillin, on 9/15 the patient overall improved, his Tmax was 100.6, he was in sinus bradycardia,'s blood pressure is within normal limits, the patient was successfully extubated on 9/14, his temperature now is improved to 99.7, consultation from internal medicine, hospital service is requested to transfer patient out of the ICU.  Family is at the bedside, updated on the patient's progress.    Review of Systems  Review of Systems   Constitutional:  Positive for malaise/fatigue.   HENT: Negative.     Eyes: Negative.    Respiratory: Negative.  Negative for cough.    Cardiovascular: Negative.  Negative for chest pain.   Gastrointestinal: Negative.    Genitourinary: Negative.   "  Musculoskeletal: Negative.    Skin: Negative.    Neurological:  Positive for dizziness and headaches.   Endo/Heme/Allergies: Negative.    Psychiatric/Behavioral:  The patient is nervous/anxious.    All other systems reviewed and are negative.      Past Medical History  No reported past medical history    Surgical History  No reported surgical history    Family History  No reported family history    Social History   reports that he has never smoked. He has never used smokeless tobacco.  The patient is , young baby  Employed in the , [employer]     Medications  Prior to Admission Medications   Prescriptions Last Dose Informant Patient Reported? Taking?   Melatonin 5 MG Chew Tab 1~2 DAYS AGO at HS Significant Other Yes Yes   Sig: Chew 10 mg at bedtime as needed (Sleep). 2 tablets = 10 mg.   Non Formulary Request 9/10/2024 at [facility] Significant Other Yes Yes   Sig: Take 1 Capsule by mouth every day. \"Alpilean Weight Loss Support\"  Contains vitamin B-12, chromium, turmeric rhizome, African mariana seed, ginger rhizome, moringa leaf, citrus bioflavonoids, fucoxanthin  Indications: Dietary Supplement   acetaminophen (TYLENOL) 500 MG Tab 9/12/2024 at 0900 Significant Other Yes Yes   Sig: Take 1,000 mg by mouth every 6 hours as needed (Headache). 2 tablets = 1,000 mg.   ibuprofen (ADVIL) 200 MG Tab 9/12/2024 at 0745 Significant Other Yes Yes   Sig: Take 400 mg by mouth every 6 hours as needed for Headache. 2 tablets = 400 mg.      Facility-Administered Medications: None       Allergies  No Known Allergies    Physical Exam  Temp:  [36.1 °C (97 °F)-36.3 °C (97.3 °F)] 36.1 °C (97 °F)  Pulse:  [39-65] 43  Resp:  [10-26] 15  BP: (109-178)/(58-86) 124/74  SpO2:  [92 %-100 %] 97 %    Physical Exam  Vitals and nursing note reviewed.   Constitutional:       Appearance: He is well-developed. He is ill-appearing.   HENT:      Head: Normocephalic.   Eyes:      Pupils: Pupils are equal, round, and reactive to light. "   Cardiovascular:      Rate and Rhythm: Normal rate and regular rhythm.      Heart sounds: Normal heart sounds.   Pulmonary:      Effort: Pulmonary effort is normal.   Abdominal:      General: Bowel sounds are normal.      Palpations: Abdomen is soft.   Genitourinary:     Penis: Normal.       Rectum: Normal.   Musculoskeletal:         General: Normal range of motion.      Cervical back: Normal range of motion.   Skin:     General: Skin is warm.   Neurological:      Mental Status: He is alert and oriented to person, place, and time.      Motor: Weakness present.         Fluids  Date 09/15/24 0700 - 09/16/24 0659   Shift [id number] 1436-5288 24 Hour Total   INTAKE   P.O. 400   400   I.V. 184.6   184.6   IV Piggyback 339.1   339.1   Shift Total 923.7   923.7   OUTPUT   Urine 325   325   Shift Total 325   325   Weight (kg) 85.6 85.6 85.6 85.6       Laboratory  Recent Labs     09/13/24  0805 09/14/24  0628 09/15/24  0710   WBC 9.4 10.6 8.6   RBC 4.52* 4.38* 3.72*   HEMOGLOBIN 13.4* 12.8* 10.9*   HEMATOCRIT 39.0* 39.4* 33.6*   MCV 86.3 90.0 90.3   MCH 29.6 29.2 29.3   MCHC 34.4 32.5 32.4   RDW 40.3 43.8 43.1   PLATELETCT 140* 141* 156*   MPV 9.4 10.2 10.1     Recent Labs     09/13/24  0805 09/14/24  0628 09/15/24  0710   SODIUM 137 143 142   POTASSIUM 4.4 4.6 3.8   CHLORIDE 104 110 108   CO2 20 20 24   GLUCOSE 165* 165* 147*   BUN 9 12 15   CREATININE 0.84 0.68 0.85   CALCIUM 8.4* 8.6 7.9*     Recent Labs     09/12/24  1803   INR 1.00          Recent Labs     09/12/24  1803 09/14/24  0628   TRIGLYCERIDE 47 179*        Imaging  MR-LUMBAR SPINE-WITH & W/O   Final Result      1.  T2 hypointense fluid-fluid level in the dependent caudal thecal sac in keeping with the history of meningitis. This likely represents intradural exudate in the setting of meningitis. Dependently layering subarachnoid hemorrhage within the thecal sac    could have a similar appearance.   2.  L4-5 tiny midline disc bulge with underlying tiny  "annular fissure. No central or foraminal stenosis.   3.  L5-S1 large left paramedian disc protrusion/extrusion with marked compromise of the emerging left S1 nerve root. Mild right foraminal stenosis. Moderate left foraminal stenosis.      MR-THORACIC SPINE-WITH & W/O   Final Result      MRI OF THE THORACIC SPINE WITHOUT AND WITH CONTRAST WITHIN NORMAL LIMITS.      MR-CERVICAL SPINE-WITH & W/O   Final Result      1.  C5-6 minimal midline disc/osteophyte complex. No central or foraminal stenosis.   2.  C6-C7 moderate-marked right foraminal stenosis due to spondylotic change.   3.  No myelopathic cord signal. Concerning the history of meningitis, no abnormal intradural extra medullary enhancement.      MR-BRAIN-WITH & W/O   Final Result      1.  The FLAIR images demonstrates diffuse hyperintense signal within the subarachnoid spaces. There is also minimal abnormal ependymal T2 hyperintensity. There is no hydrocephalus. There is no abnormal contrast enhancement. This finding is concerning for    acute Meningitis/meningeal inflammation. Please correlate with the CSF study.   2.  Mild tonsillar ectopia.      MR-VENOGRAM (MRV) HEAD   Final Result      Unremarkable MR venogram of the cerebral veins.      DX-CHEST-PORTABLE (1 VIEW)   Final Result         No acute cardiac or pulmonary abnormality is identified.      IR-MIDLINE CATHETER INSERTION WO GUIDANCE > AGE 3    (Results Pending)       Assessment/Plan  * Streptococcus pneumoniae meningitis- (present on admission)  Assessment & Plan  -presented with AMS and headaches  -LP done in ED \"milky\" showed greater than 15,000 WBC and greater than 600 protein  -PCR positive Streptococcus pneumoniae  -ID consulted, transition to penicillin IV every 4 hours, end date 9/27  -Serial neurologic exams  Close monitor    Bradycardia  Assessment & Plan  -sinus, with episodes of junctional rhythm, optimize electrolytes  -asymptomatic  -hemodynamically stable  -telemetry    Lumbar disc " herniation  Assessment & Plan  -Patient complained of back pain prior to presentation  -MRI showed disc herniation L5-S1  -Multimodal pain management  -Outpatient neurosurgery follow-up    Acute encephalopathy  Assessment & Plan  -Due to bacterial meningitis  -Keep patient awake during the day and avoid daytime naps. Remove all unnecessary lines (central lines, peripheral IVs, feeding tubes, ho catheters).  -Avoid polypharmacy, frequent re-orientation, maximize family time at bedside, use glasses and hearing aids if needed, treat pain, encourage ambulation, minimize benzos/anticholinergic agents.   -Aspiration precautions  -Seizure precautions  -Fall precautions    Sepsis due to Streptococcus pneumoniae with encephalopathy without septic shock (HCC)  Assessment & Plan  This is Sepsis Present on admission  SIRS criteria identified on my evaluation include: Tachycardia, with heart rate greater than 90 BPM  Clinical indicators of end organ dysfunction include Toxic Metabolic Encephalopathy and GCS < 15  Source is Bacterial Meningitis  Sepsis protocol initiated  Crystalloid Fluid Administration: Resuscitation volume of 0 ordered. Reason that resuscitation volume of less than 30ml/kg was ordered  His hemodynamics improved with intubation and he does not require fluids at this time.  PRN 30cc/kg LR bolus ordered for MAP <65 or SBP <90  IV antibiotics as appropriate for source of sepsis  Reassessment: I have reassessed the patient's hemodynamic status    Continue ABX per ID recommendations.  Continue steroids  Follow cultures  Hemodynamically stable off pressors    On mechanically assisted ventilation (HCC)  Assessment & Plan  -Intubated for altered mental status  -Intubation date 9/12  -extubated 9/14  Continue pulmonary toilet, oxygen weaning as tolerated    Plan  9/15  Continue steroids as currently ordered, end date 9/16  Ongoing antibiotics, high-dose, every 4 hours until 9/27, ID following  Supportive  care  Symptomatic care, headache treatment  PT OT  Mobilization as tolerated  See orders  Patient is has a high medical complexity, complex decision making and is at high risk for complication, morbidity, and mortality.  I spent 62 minutes, reviewing the chart, obtaining and/or reviewing separately obtained history. Performing a medically appropriate examination and evaluation.  Counseling and educating the patient. Ordering and reviewing medications, tests, or procedures.   Documenting clinical information in EPIC. Independently interpreting results and communicating results to patient. Discussing future disposition of care with patient, RN and case management.  Thank you for consulting with us, we will follow closely while the patient is hospitalized      Please note that this dictation was created using voice recognition software. I have made every reasonable attempt to correct obvious errors, but I expect that there are errors of grammar and possibly context that I did not discover before finalizing the note.

## 2024-09-16 NOTE — CARE PLAN
The patient is Stable - Low risk of patient condition declining or worsening    Shift Goals  Clinical Goals: neuro checks Q4 hour, administer ABX and steroids per MAR  Patient Goals: rest  Family Goals: safety    Progress made toward(s) clinical / shift goals:  all goals achieved    Patient is not progressing towards the following goals: NA      Problem: Pain - Standard  Goal: Alleviation of pain or a reduction in pain to the patient’s comfort goal  Outcome: Progressing  Note: No complaints of pain overnight     Problem: Respiratory  Goal: Patient will achieve/maintain optimum respiratory ventilation and gas exchange  Outcome: Progressing  Note: Requires 2-3L oxygen while sleeping     Problem: Urinary - Renal Perfusion  Goal: Ability to achieve and maintain adequate renal perfusion and functioning will improve  Outcome: Progressing  Note: Patient able to void after ho catheter was removed

## 2024-09-16 NOTE — PROGRESS NOTES
Infectious Disease Progress Note    Author: Carol Rousseau M.D. Date & Time of service: 2024  9:25 AM    Chief Complaint:  Streptococcus pneumoniae meningitis      Interval History:   AF, O2 Vent 8/30%, minimal vent settings, no pressors.   9/15 AF, O2 RA, sitting up in bed, still somewhat confused but is following simple commands.  Denies any headache or neck pain with movement.      Review of Systems:  Review of Systems   Gastrointestinal:  Negative for abdominal pain, constipation, diarrhea, nausea and vomiting.   Musculoskeletal:  Negative for myalgias.   Neurological:  Negative for weakness and headaches.       Hemodynamics:  Temp (24hrs), Av.4 °C (97.6 °F), Min:36.1 °C (97 °F), Max:36.7 °C (98.1 °F)  Temperature: 36.7 °C (98.1 °F)  Pulse  Av.4  Min: 20  Max: 117   Blood Pressure: 132/77       Physical Exam:  Physical Exam  Cardiovascular:      Rate and Rhythm: Normal rate and regular rhythm.      Heart sounds: Normal heart sounds.   Pulmonary:      Effort: Pulmonary effort is normal.      Breath sounds: Normal breath sounds.   Abdominal:      General: Abdomen is flat. Bowel sounds are normal.      Palpations: Abdomen is soft.   Musculoskeletal:         General: Normal range of motion.      Cervical back: Normal range of motion and neck supple. No rigidity or tenderness.   Skin:     General: Skin is warm and dry.   Neurological:      General: No focal deficit present.      Mental Status: He is oriented to person, place, and time.   Psychiatric:         Mood and Affect: Mood normal.         Behavior: Behavior normal.         Meds:    Current Facility-Administered Medications:     penicillin g    senna-docusate **AND** polyethylene glycol/lytes    acetaminophen    acetaminophen (TYLENOL) suppository    Respiratory Therapy Consult    hydrALAZINE    labetalol    dexamethasone    ondansetron    enoxaparin (LOVENOX) injection    Labs:  Recent Labs     24  0628 09/15/24  0710  09/16/24 0444   WBC 10.6 8.6 8.4   RBC 4.38* 3.72* 4.10*   HEMOGLOBIN 12.8* 10.9* 12.4*   HEMATOCRIT 39.4* 33.6* 35.8*   MCV 90.0 90.3 87.3   MCH 29.2 29.3 30.2   RDW 43.8 43.1 40.2   PLATELETCT 141* 156* 184   MPV 10.2 10.1 10.1   NEUTSPOLYS  --   --  85.90*   LYMPHOCYTES  --   --  8.30*   MONOCYTES  --   --  4.40   EOSINOPHILS  --   --  0.00   BASOPHILS  --   --  0.20     Recent Labs     09/14/24  0628 09/15/24  0710 09/16/24  0444   SODIUM 143 142 140   POTASSIUM 4.6 3.8 4.2   CHLORIDE 110 108 105   CO2 20 24 24   GLUCOSE 165* 147* 166*   BUN 12 15 21     Recent Labs     09/14/24  0628 09/15/24  0710 09/16/24  0444   ALBUMIN 3.3 2.7* 3.1*   TBILIRUBIN 0.8 0.6 0.7   ALKPHOSPHAT 35 31 39   TOTPROTEIN 6.0 4.8* 5.3*   ALTSGPT 14 14 47   ASTSGOT 16 13 34   CREATININE 0.68 0.85 0.93       Imaging:  MR-LUMBAR SPINE-WITH & W/O    Result Date: 9/13/2024 9/13/2024 4:53 PM HISTORY/REASON FOR EXAM:  Back pain with known meningitis. TECHNIQUE/EXAM DESCRIPTION: MRI of the lumbar spine without and with contrast. The study was performed on a [employer] 1.5 Blanca MRI scanner. T1 sagittal, T2 fast spin-echo sagittal, and T2 axial images were obtained of the lumbar spine. T1 post-contrast fat-suppressed sagittal images were obtained. Optional T2 fat-suppressed sagittal and T1 post-contrast axial images may be obtained. 15 mL ProHance gadolinium contrast was administered intravenously. COMPARISON:  MRI thoracic spine from today's date FINDINGS: Alignment in the lumbar spine is normal. Marrow signal in the vertebral bodies is normal. There is no abnormal osseous enhancement. There is no evidence of discitis or osteomyelitis. There is no evidence of epidural phlegmon or epidural abscess. The conus is normal in position and signal with its tip at the L2 level. There is no abnormal cord enhancement. There is no abnormal intradural extra medullary enhancement. There is no abnormal enhancement of nerve roots of the cauda equina.  However, there is T2 hypointense fluid-fluid level in the dependent caudal thecal sac. In keeping with history of meningitis, this may represent intradural exudate. Dependently layering subarachnoid hemorrhage within the thecal sac could have a similar appearance. (2 sagittal image 8, series 17, T2 fat-suppressed sagittal image 8, series 20, T2 axial image 4, series 22). At T12-L1, no abnormality. At L1-2, no abnormality. At L2-3, no abnormality. At L3-4, no abnormality. At L4-5, there is a tiny midline disc bulge with an underlying tiny annular fissure (T2 axial image 13, series 21). No central or foraminal stenosis. At L5-S1, there is a large left paramedian disc protrusion/extrusion. There is prominent impingement on the left ventral thecal sac. There is marked left lateral recess stenosis with posterior displacement of the emerging S1 nerve root (T2 axial 9, series 21). There is mild hypertrophic facet arthropathy. There is mild right foraminal stenosis and moderate left stenosis.     1.  T2 hypointense fluid-fluid level in the dependent caudal thecal sac in keeping with the history of meningitis. This likely represents intradural exudate in the setting of meningitis. Dependently layering subarachnoid hemorrhage within the thecal sac could have a similar appearance. 2.  L4-5 tiny midline disc bulge with underlying tiny annular fissure. No central or foraminal stenosis. 3.  L5-S1 large left paramedian disc protrusion/extrusion with marked compromise of the emerging left S1 nerve root. Mild right foraminal stenosis. Moderate left foraminal stenosis.    MR-THORACIC SPINE-WITH & W/O    Result Date: 9/13/2024 9/13/2024 4:52 PM HISTORY/REASON FOR EXAM:  Back pain with known meningitis. TECHNIQUE/EXAM DESCRIPTION: MRI of the thoracic spine without and with contrast. The study was performed on a [employer] 1.5 Blanca MRI scanner. T1 sagittal, T2 fast spin-echo sagittal, and T2 axial images were obtained of the thoracic  spine. T1 post-contrast fat suppressed sagittal images were obtained. Optional T1 post-contrast axial images may be obtained. 15 mL ProHance gadolinium contrast was administered intravenously. COMPARISON:  MRI lumbar spine from today's date FINDINGS: Alignment in the thoracic spine is normal. Marrow signal in the vertebral bodies is normal. There is no abnormal osseous enhancement. There is no evidence of discitis or osteomyelitis. The prevertebral and paraspinous soft tissues are unremarkable except for bibasilar atelectatic infiltrates and tiny bilateral pleural effusions. The thoracic spinal cord is normal in caliber and signal throughout its course. There is no abnormal cord enhancement. There is no abnormal intradural extra medullary enhancement. There is no significant disc bulge or protrusion, central stenosis, or foraminal stenosis at any thoracic level.     MRI OF THE THORACIC SPINE WITHOUT AND WITH CONTRAST WITHIN NORMAL LIMITS.    MR-CERVICAL SPINE-WITH & W/O    Result Date: 9/13/2024 9/13/2024 4:52 PM HISTORY/REASON FOR EXAM:  Back pain with known meningitis. TECHNIQUE/EXAM DESCRIPTION: MRI of the cervical spine without and with contrast. The study was performed on a [employer] 1.5 Blanca MRI scanner. T1 sagittal, T2 fast spin-echo sagittal, and gradient echo axial images were obtained of the cervical spine. Optional T2 fat-suppressed sagittal images may also be obtained. T1 post-contrast fat suppressed sagittal images were obtained of the cervical spine. Optional T1 post-contrast axial images may be obtained. 15 mL ProHance gadolinium contrast was administered intravenously. COMPARISON: CT cervical spine outside films 9/12/2024 FINDINGS: Alignment in the cervical spine shows no segmental subluxation. Marrow signal in the vertebral bodies is normal. There is no abnormal osseous enhancement. The prevertebral soft tissues are unremarkable except for some secretions in the pharynx with endotracheal tube and  enteric tube in situ. There are no anomalies at the craniovertebral junction.  The cervical spinal cord is normal in caliber and signal throughout its course. There is no abnormal cord enhancement. There is no abnormal intradural extra medullary enhancement. At C2-3, no abnormality. At C3-4, no abnormality. At C4-5, no abnormality. At C5-6, minimal disc-osteophyte complex. Some thinning of ventral subarachnoid space. Dorsal subarachnoid space remains intact. There is no central stenosis. The neural foramina are intact. At C6-7, no significant disc bulge or protrusion. No central stenosis. The left neural foramen is intact. There is moderate-marked right foraminal stenosis due to spondylotic change. At C7-T1, no abnormality.     1.  C5-6 minimal midline disc/osteophyte complex. No central or foraminal stenosis. 2.  C6-C7 moderate-marked right foraminal stenosis due to spondylotic change. 3.  No myelopathic cord signal. Concerning the history of meningitis, no abnormal intradural extra medullary enhancement.    MR-VENOGRAM (MRV) HEAD    Result Date: 9/13/2024 9/12/2024 8:35 PM HISTORY/REASON FOR EXAM:  ALTERED LEVEL OF CONSCIOUSNESS. TECHNIQUE/EXAM DESCRIPTION: MR venogram of the cerebral veins-time-of-flight sequences COMPARISON:  None FINDINGS: The superior sagittal sinus is widely patent. There is widely patent dominant RIGHT transverse and sigmoid sinuses. There is hypoplastic LEFT transverse and sigmoid sinuses.     Unremarkable MR venogram of the cerebral veins.    MR-BRAIN-WITH & W/O    Result Date: 9/13/2024 9/12/2024 8:35 PM HISTORY/REASON FOR EXAM:  ALTERED LEVEL OF CONSCIOUSNESS. TECHNIQUE/EXAM DESCRIPTION: MR brain with and without contrast. Multiplanar multisequence MR examination of the brain with and without contrast done on 1.5 MRI scanner. 15 mL ProHance contrast was administered intravenously. COMPARISON:  None. FINDINGS: The FLAIR images demonstrates diffuse hyperintense signal within the  subarachnoid spaces. There is also minimal abnormal ependymal T2 hyperintensity. There is no hydrocephalus. There is no abnormal contrast enhancement. There is no restricted diffusion, acute hemorrhage, hydrocephalus, intracranial space-occupying lesion, abnormal volume loss, vasogenic edema or mass effect. There is mild tonsillar ectopia. The gray matter structures are unremarkable. The supra and infratentorial white matter is unremarkable.  There is no subdural or epidural space-occupying process. The visualized flow voids of the cerebral vasculature are unremarkable.  There is no large lesion identified in the expected course of the intracranial portions of the cranial nerves. The visualized bones, muscles, adipose tissue and the glands are unremarkable. There is mucosal thickening in the paranasal sinuses.     1.  The FLAIR images demonstrates diffuse hyperintense signal within the subarachnoid spaces. There is also minimal abnormal ependymal T2 hyperintensity. There is no hydrocephalus. There is no abnormal contrast enhancement. This finding is concerning for  acute Meningitis/meningeal inflammation. Please correlate with the CSF study. 2.  Mild tonsillar ectopia.    DX-CHEST-PORTABLE (1 VIEW)    Result Date: 9/12/2024 9/12/2024 6:36 PM HISTORY/REASON FOR EXAM:  Shortness of Breath TECHNIQUE/EXAM DESCRIPTION AND NUMBER OF VIEWS: Single portable view of the chest. COMPARISON: None FINDINGS: Tracheal tube tip is below the level of the clavicles. NG tube is in the proximal stomach. Heart size is within normal limits. No focal infiltrates or consolidations are identified in the lungs. No pleural fluid collections are identified. No pneumothorax is appreciated.     No acute cardiac or pulmonary abnormality is identified.      Micro:  Results       Procedure Component Value Units Date/Time    GRAM STAIN [[id number]]  (Abnormal) Collected: 09/12/24 1946    Order Status: Completed Specimen: CSF Updated: 09/14/24 9642      Significant Indicator .     Source CSF     Site TAP     Gram Stain Result Many WBCs.  Moderate Gram positive cocci.      Narrative:      [facility] tel. [phone] 09/13/2024, 07:13, RB PERF. RESULTS CALLED TO:[id number]  ER tel.  09/12/2024, 20:50, RB PERF. RESULTS CALLED TO: Hallie GILLIAM [phone]    E-Test [[id number]] Collected: 09/12/24 1946    Order Status: Completed Specimen: Other Updated: 09/14/24 1358     ETEST Sensitivity FINAL    Narrative:      [facility] tel. [phone] 09/13/2024, 07:13, RB PERF. RESULTS CALLED TO:[id number]  ER tel.  09/12/2024, 20:50, RB PERF. RESULTS CALLED TO: Hallie Mcwilliams    CSF CULTURE [[id number]]  (Abnormal)  (Susceptibility) Collected: 09/12/24 1946    Order Status: Completed Specimen: CSF from Tap Updated: 09/14/24 1358     Significant Indicator POS     Source CSF     Site TAP     Culture Result -     Gram Stain Result Many WBCs.  Moderate Gram positive cocci.       Culture Result Streptococcus pneumoniae  Moderate growth      Narrative:      [facility] tel. [phone] 09/13/2024, 07:13, RB PERF. RESULTS CALLED TO:[id number]  ER tel.  09/12/2024, 20:50, RB PERF. RESULTS CALLED TO: Hallie GILLIAM [phone]    Susceptibility       Streptococcus pneumoniae (1)       Antibiotic Interpretation Microscan   Method Status    Penicillin-meningitis Sensitive 0.032 mcg/mL E-TEST Final    Penicillin non-meningitis Sensitive 0.032 mcg/mL E-TEST Final    Cefotaxime-meningitis Sensitive 0.012 mcg/mL E-TEST Final    Cefotaxime-non meningitis Sensitive 0.012 mcg/mL E-TEST Final    Erythromycin Sensitive 0.064 mcg/mL E-TEST Final    Levofloxacin Sensitive 1.0 mcg/mL E-TEST Final                       MRSA By PCR (Amp) [[id number]] Collected: 09/12/24 2205    Order Status: Completed Specimen: Respirate from Nares Updated: 09/13/24 1230     MRSA by PCR Negative    BLOOD CULTURE [[id number]] Collected: 09/12/24 2220    Order Status: Completed Specimen: Blood from Peripheral Updated: 09/13/24 0008     Significant Indicator NEG     Source BLD      Site PERIPHERAL     Culture Result No Growth  Note: Blood cultures are incubated for 5 days and  are monitored continuously.Positive blood cultures  are called to the RN and reported as soon as  they are identified.      BLOOD CULTURE [[id number]] Collected: 09/12/24 2205    Order Status: Completed Specimen: Blood from Peripheral Updated: 09/13/24 0008     Significant Indicator NEG     Source BLD     Site PERIPHERAL     Culture Result No Growth  Note: Blood cultures are incubated for 5 days and  are monitored continuously.Positive blood cultures  are called to the RN and reported as soon as  they are identified.      URINALYSIS [[id number]]  (Abnormal) Collected: 09/12/24 1843    Order Status: Completed Specimen: Urine Updated: 09/12/24 1918     Color Orange     Character Clear     Specific Gravity >=1.045     Ph 5.0     Glucose 500 mg/dL      Ketones 80 mg/dL      Protein Negative mg/dL      Bilirubin Negative     Urobilinogen, Urine 0.2     Nitrite Negative     Leukocyte Esterase Negative     Occult Blood Moderate     Micro Urine Req Microscopic    URINALYSIS [[id number]]     Order Status: Canceled Specimen: Urine             Assessment:  Active Hospital Problems    Diagnosis     *Streptococcus pneumoniae meningitis [G00.1]     Bradycardia [R00.1]     Acute encephalopathy [G93.40]     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   Micro reviewed    Patient improved today, some ongoing dizziness but overall mentation is better.  Antibiotics as below.    Assessment:  Kwesi Rivera is a 34 y.o. man with no prior medical history admitted from [facility] secondary to acute encephalopathy  and headache.  Lumbar puncture on 9/12 consistent with bacterial meningitis with both CSF cultures and meningitis/encephalitis PCR panel positive for Streptococcus pneumoniae.  Blood cultures on admission are negative to date.      Streptococcus pneumoniae meningitis       Ventilator dependent respiratory failure, extubated on 9/14  Acute encephalopathy, secondary to above  Back pain, prior to admission  -MRI lumbar spine with fluid in the T2 caudal thecal sac, reported as consistent with meningitis likely represents intradural exudate, herniation and plan is for follow-up with neurosurgery as an outpatient   -MRI cervical thoracic spine with no obvious infectious finding  Bradycardia, stable      Plan:  - Continue penicillin, strep pneumo with high sensitivity to penicillin and this antibiotic has good CNS penetration  - penicillin G 4,000,000 units every 4 hours   --- Anticipate 14-day antibiotic course, can potentially use continuous infusion penicillin on discharge, end 9/26  -Monitor renal function and Vanco trough  -CSF cultures +Streptococcus pneumoniae, sensitive to cephalosporins, sensitive to levofloxacin, penicillin meningitis MATTHEW 0.032, highly sensitive  -Continue dexamethasone for 4 days  -Continue to monitor neurologic status        This infection poses a threat to life and further neurologic function     Disposition: TBD, plan is for discharge to SNF, okay to use continuous infusion penicillin if this is helpful for discharge.     Need for PICC line: Midline ordered     Plan of care discussed with ICU APRN, Tamar Jc and  ICU nurse. Will continue to follow.

## 2024-09-16 NOTE — THERAPY
"Speech Language Pathology   Cognitive Evaluation     Patient Name: Kwesi Rivera  AGE:  34 y.o., SEX:  male  Medical Record #: [id number]  Date of Service: 9/16/2024    History of Present Illness  34 y.o. male who presented from OSH on 9/12 with AMS. At OSH, pt with headaches, nonverbal, GCS 9, no focal deficits and unable to follow commands. Pt transferred to [facility] for further neurological evaluation. Pt intubated 9/12- 9/14. Found to have streptococcus pneumoniae meningitis.      CMHx: streptococcus pneumoniae meningitis, lumbar disc herniation, sepsis     No past medical history on file.     MR-Brain:  \"1.  The FLAIR images demonstrates diffuse hyperintense signal within the subarachnoid spaces. There is also minimal abnormal ependymal T2 hyperintensity. There is no hydrocephalus. There is no abnormal contrast enhancement. This finding is concerning for acute Meningitis/meningeal inflammation. Please correlate with the CSF study.  2.  Mild tonsillar ectopia.\"     CXR 9/12:  No acute cardiac or pulmonary abnormality is identified.    General Information   Level of Consciousness: Alert, Awake  Orientation: Oriented x 4  Follows Directives: Yes    Prior Living Situation & Level of Function  Prior Services: None  Comments: Pt lives w/ his spouse and their 3 month old infant     Communication: WNL per report  Swallowing: WNL per report     Oral Mechanism Evaluation  Dentition: Good, Natural dentition  Facial Symmetry: Equal  Facial Sensation: Equal  Labial Observations: WFL  Lingual Observations: Midline  Motor Speech: WNL    Laryngeal Function Exam  Secretion Management: Adequate  Voice Quality: WFL  Cough: Perceptually WNL    Subjective  Pt seen A&Ox4 sitting upright eating breakfast w/ his wife and infant present in the room    Communication Domain(s)  Expressive Language: WFL  Receptive Language: WFL  Cognitive-Linguistic: WFL  Reading: WFL  Social/Pragmatic: WFL    Assessment  The patient was seen this date for a " "Speech-Language/Cognitive evaluation.    Cognistat  Orientation: Average  Attention: Average  Comprehension: Average  Repetition: Average  Naming: Average  Memory: Average  Calculations: Average  Similarities: Average  Judgement: Average    Medication Management  Medication Management: +3/3 independently    Clock Drawing  Clock Drawing: Within Functional Limits    Clinical Impressions  The pt completed standardized and non-standardized measures indicating cognitive-linguistic functioning within normal limits and functional to return to completing iADLs independently. He endorsed feeling mildly slowed information processing speed as compared to his baseline, but completed all the tasks within a reasonable amount of time. Provided education to pt and spouse regarding cognitive fatigue and gradual progression back to daily activities. All questions were answered- no further ST services appear indicated at this time.     NOTE: It is not within the scope of practice of Speech-Language Pathologists to determine patient capacity. Please defer to the physician or psych to complete this assessment.     Recommendations  Supervision Needs Upons Discharge: None  IADLs: N/A    SLP Treatment Plan  Treatment Plan: None Indicated  SLP Frequency: N/A - Evaluation Only  Estimated Duration: N/A - Evaluation Only    Anticipated Discharge Needs  Discharge Recommendations: Anticipate that the patient will have no further speech therapy needs after discharge from the hospital  Therapy Recommendations Upon DC: Not Indicated    Patient / Family Goals  Patient / Family Goal #1: Updated 9/15: \"that would be great.\" (water)  Goal #1 Outcome: Goal met  Short Term Goal # 1: Pt will participate in prefeeding trials with SLP without overt s/sx of aspiration  Goal Outcome # 1: Goal met    JAISON Guevara  "

## 2024-09-16 NOTE — HOSPITAL COURSE
34 y.o. male who presented 9/12/2024 with no significant past medical history, presented to outside facility with altered mentation. The patient presented to the sending facility with headaches, he was nonverbal at the time, GCS of 9, there were no focal deficits, the patient was unable to follow commands, a CT a CTA of the head showed some diffuse edema bilateral in the hemispheres. He was transferred for further neurological evaluation. Intubated on arrival and underwent a lumbar puncture with milky appearing CSF, PCR showed strep pneumo with greater than 15,000 white cell count, greater than 600 protein on CSF, the patient was admitted to ICU, started on vancomycin, ceftriaxone, steroids. MRI brain performed without evidence of venous thrombosis. Infectious disease consulted, the patient was transition to high-dose penicillin. Patient completed a course of steroids. PICC line placed. PT/OT recommended post acute placement which case management assisted in organizing. Patient will be on IV penicillin until 09/26. Patient improved clinically and was agreeable to discharge.

## 2024-09-16 NOTE — THERAPY
"Occupational Therapy  Daily Treatment     Patient Name: Kwesi Rivera  Age:  34 y.o., Sex:  male  Medical Record #: [id number]  Today's Date: 9/16/2024     Precautions  Precautions: (P) Fall Risk  Comments: (P) seizure precautions    Assessment    Pt seen for OT tx. Pt seen with spouse present. Pt has progressed towards all OT goals, demonstrating functional mobility with FWW SBA, continues to be limited due to impaired balance and strength. Pt performing LB dressing SBA, toileting SBA. Pt additionally fatigues with increased activity, continues to be on 2L of O2. Discussed recommendations with spouse and pt, continue to recommend post acute placement at inpt rehab to maximize independence and return to baseline level of functioning. Per spouse, pt unable to provide physical assistance as she is caring for their baby. Will continue to benefit from inpt OT     Plan    Treatment Plan Status: (P) Continue Current Treatment Plan  Type of Treatment: Self Care / Activities of Daily Living, Adaptive Equipment, Cognitive Skill Development, Community Re-Integration, Manual Therapy Techniques, Neuro Re-Education / Balance, Therapeutic Exercises, Therapeutic Activity, Family / Caregiver Training  Treatment Frequency: 5 Times per Week  Treatment Duration: Until Therapy Goals Met    DC Equipment Recommendations: (P) Unable to determine at this time  Discharge Recommendations: (P) Recommend post-acute placement for additional occupational therapy services prior to discharge home    Subjective    \"I feel like I want to go home\"      Objective       09/16/24 0939   Precautions   Precautions Fall Risk   Comments seizure precautions   Vitals   Pulse Oximetry 95 %   O2 (LPM) 2   O2 Delivery Device Silicone Nasal Cannula   Pain   Pain Scales Non Verbal Scale   Intervention Declines   Pain 0 - 10 Group   Therapist Pain Assessment During Activity;Post Activity Pain Same as Prior to Activity;Nurse Notified;0   Non Verbal Descriptors   Non " Verbal Scale  Calm   Cognition    Cognition / Consciousness X   Level of Consciousness Alert   Ability To Follow Commands 3 Step   Comments Pt A&O x3, required cue for exact date. Pt much improved mentation, able to make needs known and follows all commands. mild flat affect, reports he feels close to baseline mentation   Active ROM Upper Body   Active ROM Upper Body  WDL   Dominant Hand Right   Strength Upper Body   Upper Body Strength  WDL   Sensation Upper Body   Upper Extremity Sensation  WDL   Upper Body Muscle Tone   Upper Body Muscle Tone  WDL   Other Treatments   Other Treatments Provided discussed rehab vs home. educ on recommendations and encouragement for regaining indep prior to going home. spouse has to care for 5/6 month old baby   Balance   Sitting Balance (Static) Good   Sitting Balance (Dynamic) Fair +   Standing Balance (Static) Fair   Standing Balance (Dynamic) Fair -   Weight Shift Sitting Fair   Weight Shift Standing Fair   Skilled Intervention Verbal Cuing   Comments w/FWw   Bed Mobility    Supine to Sit Standby Assist   Scooting Standby Assist   Skilled Intervention Verbal Cuing   Comments HOB flat.   Activities of Daily Living   Eating Independent   Grooming Supervision;Standing   Upper Body Dressing Supervision   Lower Body Dressing Standby Assist  (don/doff socks)   Toileting Standby Assist   Skilled Intervention Verbal Cuing   Functional Mobility   Sit to Stand Standby Assist   Bed, Chair, Wheelchair Transfer Standby Assist   Toilet Transfers Standby Assist   Transfer Method Stand Step   Skilled Intervention Tactile Cuing;Verbal Cuing   Comments w/FWW and without FWW for back to chair   Visual Perception   Visual Perception  WDL   Activity Tolerance   Sitting in Chair post session   Sitting Edge of Bed 4 min   Standing 5 min total   Patient / Family Goals   Patient / Family Goal #1 to go home   Short Term Goals   Short Term Goal # 1 Pt will complete ADL transfers with modA   Goal Outcome #  1 Goal met, new goal added   Short Term Goal # 1 B  Pt will complete functional mobility supervised   Short Term Goal # 2 Pt will complete seated G/H with modA   Goal Outcome # 2 Goal met, new goal added   Short Term Goal # 2 B  Pt will complete standing grooming tasks supervised   Short Term Goal # 3 Pt will complete LB dressing with modA   Goal Outcome # 3 Goal met, new goal added   Short Term Goal # 3 B Pt will complete UB/LB bathing supervised   Education Group   Education Provided Activities of Daily Living;Home Safety   Role of Occupational Therapist Patient Response Patient;Nonacceptance;Explanation;Reinforcement Needed;No Learning Evidence   Home Safety Patient Response Patient;Family;Acceptance;Explanation;Demonstration;Verbal Demonstration;Action Demonstration   ADL Patient Response Patient;Significant Other;Acceptance;Demonstration;Explanation;Verbal Demonstration;Action Demonstration   Occupational Therapy Treatment Plan    O.T. Treatment Plan Continue Current Treatment Plan   Anticipated Discharge Equipment and Recommendations   DC Equipment Recommendations Unable to determine at this time   Discharge Recommendations Recommend post-acute placement for additional occupational therapy services prior to discharge home   Interdisciplinary Plan of Care Collaboration   IDT Collaboration with  Nursing;Physical Therapist Assistant (PTA)   Patient Position at End of Therapy Seated;Call Light within Reach;Tray Table within Reach;Family / Friend in Room   Collaboration Comments RN updated   Session Information   Date / Session Number  9/16, #2 (2/5, 9/19)

## 2024-09-16 NOTE — THERAPY
Physical Therapy   Daily Treatment     Patient Name: Kwesi Rivera  Age:  34 y.o., Sex:  male  Medical Record #: [id number]  Today's Date: 9/16/2024     Precautions  Precautions: (P) Fall Risk  Comments: (P) seizure precautions    Assessment    The awake/alert visiting w/his wife BS, flat affect noted. The pt lives in [place] in a 2LH w/no steps to enter, the pt has to be able to manage a FOS in order to get to his bedroom/full bath. Functionally the pt is now @ SBA level w/sup<->sit transitions, Min assistance w/amb using the FWW, and SBA for STS/transfers w/FWW. The pt is following commands throughout tx session, no impulsivity noted.   The pt would benefit from short rehab stay to progress pt toward indep level w/functional mobility and ADL's. The pt's wife is taking care of their 4 month old and 9 yo dtrs.   PT will cont.     Plan    Treatment Plan Status: (P) Continue Current Treatment Plan  Type of Treatment: Bed Mobility, Equipment, Family / Caregiver Training, Gait Training, Neuro Re-Education / Balance, Stair Training, Therapeutic Activities, Therapeutic Exercise  Treatment Frequency: 5 Times per Week  Treatment Duration: Until Therapy Goals Met    DC Equipment Recommendations: (P) Unable to determine at this time  Discharge Recommendations: (P) Recommend post-acute placement for additional physical therapy services prior to discharge home (recommend short stay IPR)    Objective       09/16/24 1154   Time In/Time Out   Therapy Start Time 1120   Therapy End Time 1154   Total Therapy Time 34   Charge Group   PT Gait Training (Units) 1   PT Therapeutic Activities (Units) 1   Total Time Spent   PT Gait Training Time Spent (Mins) 8   PT Therapeutic Activities Time Spent (Mins) 26   PT Total Time Spent (Calculated) 34   Precautions   Precautions Fall Risk   Comments seizure precautions   Pain 0 - 10 Group   Pain Rating Scale (NPRS) 0   Therapist Pain Assessment During Activity   Cognition    Level of Consciousness Alert    Ability To Follow Commands 3 Step   Other Treatments   Other Treatments Provided RA SATS in the mid-90's during PT session. The wife stated they live in [place] in a 2LH, FOS to the bedrooms/bathroom, and no MALIK. The wife stays home to take care of 7yo dtr and 4 month old baby.   Balance   Sitting Balance (Static) Good   Sitting Balance (Dynamic) Fair +   Standing Balance (Static) Fair   Standing Balance (Dynamic) Fair -   Weight Shift Sitting Good   Weight Shift Standing Fair   Skilled Intervention Verbal Cuing   Comments standing w/FWW   Bed Mobility    Supine to Sit Standby Assist   Sit to Supine Standby Assist   Scooting Standby Assist   Rolling Supervised   Skilled Intervention Verbal Cuing   Comments HOB flat and use of railing   Gait Analysis   Gait Level Of Assist Minimal Assist   Assistive Device Front Wheel Walker   Distance (Feet) 50   # of Times Distance was Traveled 1   Deviation Shuffled Gait   Skilled Intervention Verbal Cuing;Postural Facilitation   Comments The pt was able to initiate amb today w/FWW managing 50' x 1. Short distance wo/AD, the pt c/o increase feeling of unsteadiness.   Functional Mobility   Sit to Stand Standby Assist   Bed, Chair, Wheelchair Transfer Standby Assist   Skilled Intervention Verbal Cuing   Comments w/FWW   6 Clicks Assessment - How much HELP from from another person do you currently need... (If the patient hasn't done an activity recently, how much help from another person do you think he/she would need if he/she tried?)   Turning from your back to your side while in a flat bed without using bedrails? 3   Moving from lying on your back to sitting on the side of a flat bed without using bedrails? 3   Moving to and from a bed to a chair (including a wheelchair)? 3   Standing up from a chair using your arms (e.g., wheelchair, or bedside chair)? 3   Walking in hospital room? 3   Climbing 3-5 steps with a railing? 2   6 clicks Mobility Score 17   Short Term Goals    Short  Term Goal # 1 Patient will perform supine-sit, sit-supine with HOB flat without rails with supervision in 6 visits to safely get in & out of bed   Goal Outcome # 1 goal not met   Short Term Goal # 2 Patient will perform sit-stand with LRAD with supervision in 6 visits to progress with functional mobility   Goal Outcome # 2 Goal not met   Short Term Goal # 3 Patient will perform chair transfers with LRAD with supervision in 6 visits to safely get OOB to chair   Goal Outcome # 3 Goal not met   Short Term Goal # 4 Patient will ambulate 100 feet with LRAD with supervision in 6 visits to safely ambulate household distance   Goal Outcome # 4 Goal not met   Education Group   Role of Physical Therapist Patient Response Patient;Acceptance;Explanation;Action Demonstration   Physical Therapy Treatment Plan   Physical Therapy Treatment Plan Continue Current Treatment Plan   Anticipated Discharge Equipment and Recommendations   DC Equipment Recommendations Unable to determine at this time   Discharge Recommendations Recommend post-acute placement for additional physical therapy services prior to discharge home  (recommend short stay IPR)   Interdisciplinary Plan of Care Collaboration   IDT Collaboration with  Nursing;Family / Caregiver   Patient Position at End of Therapy In Bed;Call Light within Reach;Tray Table within Reach;Phone within Reach   Collaboration Comments Nrsg notified. Pt cleared to have no bed alarm on w/wife BS.   Session Information   Date / Session Number  9/16--2 (2/5, 9/19) PTA/1   Supervising Physical Therapist (PTA Treatments Only)   Supervising Physical Therapist Emelyn No

## 2024-09-16 NOTE — ASSESSMENT & PLAN NOTE
"-presented with AMS and headaches  -LP done in ED \"milky\" showed greater than 15,000 WBC and greater than 600 protein  -PCR positive Streptococcus pneumoniae  -ID consulted, transition to penicillin IV every 4 hours, end date 9/26    Completed steroid therapy  Antibiotics through 09/26  Midline placed  ID recs  Plan for discharge to [facility]  "

## 2024-09-16 NOTE — PROGRESS NOTES
Hospital Medicine Daily Progress Note    Date of Service  9/16/2024    Chief Complaint  Kwesi Rivera is a 34 y.o. male admitted 9/12/2024 with altered mentation    Hospital Course  34 y.o. male who presented 9/12/2024 with no significant past medical history, presented to outside facility with altered mentation. The patient presented to the sending facility with headaches, he was nonverbal at the time, GCS of 9, there were no focal deficits, the patient was unable to follow commands, a CT a CTA of the head showed some diffuse edema bilateral in the hemispheres. He was transferred for further neurological evaluation. Intubated on arrival and underwent a lumbar puncture with milky appearing CSF, PCR showed strep pneumo with greater than 15,000 white cell count, greater than 600 protein on CSF, the patient was admitted to ICU, started on vancomycin, ceftriaxone, steroids. MRI brain performed without evidence of venous thrombosis. Infectious disease consulted, the patient was transition to high-dose penicillin.     Interval Problem Update  Patient was seen and examined at bedside.  No acute events overnight. Patient is resting comfortably in bed and in no acute distress. Wife updated at bedside.    Steroid through 09/16  Antibiotics through 09/26  Midline ordered  ID recs  Plan for discharge to [facility]    I have discussed this patient's plan of care and discharge plan at IDT rounds today with Case Management, Nursing, Nursing leadership, and other members of the IDT team.    Consultants/Specialty  infectious disease    Code Status  Full Code    Disposition  Medically Cleared  I have placed the appropriate orders for post-discharge needs.    Review of Systems  Review of Systems   Constitutional:  Negative for chills and fever.   HENT:  Negative for congestion.    Eyes:  Negative for blurred vision and double vision.   Respiratory:  Negative for cough.    Cardiovascular:  Negative for chest pain and palpitations.    Gastrointestinal:  Negative for nausea and vomiting.   Genitourinary:  Negative for dysuria and frequency.   Musculoskeletal:  Negative for back pain and falls.   Neurological:  Negative for headaches.        Physical Exam  Temp:  [36.3 °C (97.3 °F)-36.7 °C (98.1 °F)] 36.6 °C (97.9 °F)  Pulse:  [37-46] 46  Resp:  [12-18] 18  BP: (102-132)/(59-77) 125/69  SpO2:  [91 %-97 %] 95 %    Physical Exam  Vitals and nursing note reviewed.   Constitutional:       General: He is not in acute distress.  HENT:      Right Ear: External ear normal.      Left Ear: External ear normal.      Nose: No congestion.      Mouth/Throat:      Pharynx: No oropharyngeal exudate.   Eyes:      General: No scleral icterus.  Cardiovascular:      Rate and Rhythm: Bradycardia present.      Heart sounds: No murmur heard.  Pulmonary:      Breath sounds: No wheezing.   Abdominal:      Tenderness: There is no abdominal tenderness. There is no guarding or rebound.   Musculoskeletal:         General: No swelling or deformity.   Skin:     Coloration: Skin is not jaundiced.      Findings: No bruising.   Neurological:      General: No focal deficit present.      Mental Status: He is alert.      Motor: No weakness.   Psychiatric:         Mood and Affect: Mood normal.         Behavior: Behavior normal.         Fluids    Intake/Output Summary (Last 24 hours) at 9/16/2024 1502  Last data filed at 9/16/2024 1000  Gross per 24 hour   Intake 513.23 ml   Output 0 ml   Net 513.23 ml        Laboratory  Recent Labs     09/14/24  0628 09/15/24  0710 09/16/24  0444   WBC 10.6 8.6 8.4   RBC 4.38* 3.72* 4.10*   HEMOGLOBIN 12.8* 10.9* 12.4*   HEMATOCRIT 39.4* 33.6* 35.8*   MCV 90.0 90.3 87.3   MCH 29.2 29.3 30.2   MCHC 32.5 32.4 34.6   RDW 43.8 43.1 40.2   PLATELETCT 141* 156* 184   MPV 10.2 10.1 10.1     Recent Labs     09/14/24  0628 09/15/24  0710 09/16/24  0444   SODIUM 143 142 140   POTASSIUM 4.6 3.8 4.2   CHLORIDE 110 108 105   CO2 20 24 24   GLUCOSE 165* 147* 166*    BUN 12 15 21   CREATININE 0.68 0.85 0.93   CALCIUM 8.6 7.9* 8.1*             Recent Labs     09/14/24  0628   TRIGLYCERIDE 179*       Imaging  MR-LUMBAR SPINE-WITH & W/O   Final Result      1.  T2 hypointense fluid-fluid level in the dependent caudal thecal sac in keeping with the history of meningitis. This likely represents intradural exudate in the setting of meningitis. Dependently layering subarachnoid hemorrhage within the thecal sac    could have a similar appearance.   2.  L4-5 tiny midline disc bulge with underlying tiny annular fissure. No central or foraminal stenosis.   3.  L5-S1 large left paramedian disc protrusion/extrusion with marked compromise of the emerging left S1 nerve root. Mild right foraminal stenosis. Moderate left foraminal stenosis.      MR-THORACIC SPINE-WITH & W/O   Final Result      MRI OF THE THORACIC SPINE WITHOUT AND WITH CONTRAST WITHIN NORMAL LIMITS.      MR-CERVICAL SPINE-WITH & W/O   Final Result      1.  C5-6 minimal midline disc/osteophyte complex. No central or foraminal stenosis.   2.  C6-C7 moderate-marked right foraminal stenosis due to spondylotic change.   3.  No myelopathic cord signal. Concerning the history of meningitis, no abnormal intradural extra medullary enhancement.      MR-BRAIN-WITH & W/O   Final Result      1.  The FLAIR images demonstrates diffuse hyperintense signal within the subarachnoid spaces. There is also minimal abnormal ependymal T2 hyperintensity. There is no hydrocephalus. There is no abnormal contrast enhancement. This finding is concerning for    acute Meningitis/meningeal inflammation. Please correlate with the CSF study.   2.  Mild tonsillar ectopia.      MR-VENOGRAM (MRV) HEAD   Final Result      Unremarkable MR venogram of the cerebral veins.      DX-CHEST-PORTABLE (1 VIEW)   Final Result         No acute cardiac or pulmonary abnormality is identified.      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)  Assessment & Plan  -presented with AMS and headaches  -LP done in ED \"milky\" showed greater than 15,000 WBC and greater than 600 protein  -PCR positive Streptococcus pneumoniae  -ID consulted, transition to penicillin IV every 4 hours, end date 9/26      Steroid through 09/16  Antibiotics through 09/26  Midline ordered  ID recs    Lumbar disc herniation  Assessment & Plan  -Patient complained of back pain prior to presentation  -MRI showed disc herniation L5-S1  -Multimodal pain management  -Outpatient neurosurgery follow-up    Acute encephalopathy  Assessment & Plan  -Due to bacterial meningitis  -Keep patient awake during the day and avoid daytime naps. Remove all unnecessary lines (central lines, peripheral IVs, feeding tubes, ho catheters).  -Avoid polypharmacy, frequent re-orientation, maximize family time at bedside, use glasses and hearing aids if needed, treat pain, encourage ambulation, minimize benzos/anticholinergic agents.   -Aspiration precautions  -Seizure precautions  -Fall precautions    Bradycardia  Assessment & Plan  -sinus, with episodes of junctional rhythm, optimize electrolytes  -asymptomatic  -hemodynamically stable  -telemetry    Sepsis due to Streptococcus pneumoniae with encephalopathy without septic shock (HCC)  Assessment & Plan  This is Sepsis Present on admission    On mechanically assisted ventilation (HCC)  Assessment & Plan  -extubated 9/14           VTE prophylaxis:    enoxaparin ppx      I have performed a physical exam and reviewed and updated ROS and Plan today (9/16/2024). In review of yesterday's note (9/15/2024), there are no changes except as documented above.    Greater than 51 minutes spent prepping to see patient (e.g. review of tests) obtaining and/or reviewing separately obtained history. Performing a medically appropriate examination and/ evaluation.  Counseling and educating the " patient/family/caregiver.  Ordering medications, tests, or procedures.  Referring and communicating with other health care professionals.  Documenting clinical information in EPIC.  Independently interpreting results and communicating results to patient/family/caregiver.  Care coordination.

## 2024-09-16 NOTE — ASSESSMENT & PLAN NOTE
-Due to bacterial meningitis  -Keep patient awake during the day and avoid daytime naps. Remove all unnecessary lines (central lines, peripheral IVs, feeding tubes, ho catheters).  -Avoid polypharmacy, frequent re-orientation, maximize family time at bedside, use glasses and hearing aids if needed, treat pain, encourage ambulation, minimize benzos/anticholinergic agents.   -Aspiration precautions  -Seizure precautions  -Fall precautions

## 2024-09-16 NOTE — PROGRESS NOTES
1630 Pt arrived to S196-2 with ERNESTO RN. Patient oriented to room, educated to call for assistance. Wife at bedside. Tele on, monitor room notified.     1705: notified by monitor room that patient is bradycardic down to 36, also in a possible junctional rhythm. Dr. Fuller notified, EKG ordered.     1748: EKG shows bradycardia. MD aware, monitor overnight.

## 2024-09-16 NOTE — DISCHARGE PLANNING
[facility] Transitional Care Coordination     Referral from: Dr. Child  Insurance Provider on Facesheet: Debbie  Potential Rehab Diagnosis: Encephalopathy     Chart review indicates patient may have on going medical management and may have therapy needs to possibly meet inpatient rehab facility criteria with the goal of returning to community.     D/C support: spouse     Physiatry consultation forwarded per protocol.   Will need updated therapy as appropriate.  TCC will follow      Thank you for the referral.

## 2024-09-16 NOTE — CARE PLAN
The patient is Stable - Low risk of patient condition declining or worsening    Shift Goals  Clinical Goals: neuro checks Q4 hour, administer ABX and steroids per MAR  Patient Goals: rest  Family Goals: safety      Problem: Knowledge Deficit - Standard  Goal: Patient and family/care givers will demonstrate understanding of plan of care, disease process/condition, diagnostic tests and medications  Description: Target End Date:  1-3 days or as soon as patient condition allows    Document in Patient Education    1.  Patient and family/caregiver oriented to unit, equipment, visitation policy and means for communicating concern  2.  Complete/review Learning Assessment  3.  Assess knowledge level of disease process/condition, treatment plan, diagnostic tests and medications  4.  Explain disease process/condition, treatment plan, diagnostic tests and medications  Outcome: Progressing     Problem: Skin Integrity  Goal: Skin integrity is maintained or improved  Description: Target End Date:  Prior to discharge or change in level of care    Document interventions on Skin Risk/Kenneth flowsheet groups and corresponding LDA    1.  Assess and monitor skin integrity, appearance and/or temperature  2.  Assess risk factors for impaired skin integrity and/or pressures ulcers  3.  Implement precautions to protect skin integrity in collaboration with interdisciplinary team  4.  Implement pressure ulcer prevention protocol if at risk for skin breakdown  5.  Confirm wound care consult if at risk for skin breakdown  6.  Ensure patient use of pressure relieving devices  (Low air loss bed, waffle overlay, heel protectors, ROHO cushion, etc)  Outcome: Progressing

## 2024-09-16 NOTE — CONSULTS
"    Physical Medicine and Rehabilitation Consultation              Date of initial consultation: 9/16/2024  Requesting provider: ordered by Braeden Child D.O. at 09/16/24 1036    Consulting provider: Aleah Lee D.O.  Reason for consultation: assess for acute inpatient rehab appropriateness  LOS: 4 Day(s)    Chief complaint: AMS     HPI: The patient is a 34 y.o.  male with no known PMHx;  who presented on 9/12/2024  5:50 PM with AMS after having a HA. Per documentation, patient had a headachge on 9/12, he laid down to take a nap and woke up with ASM. Upon eval at [facility] ED, patient was non verbal and had GCS 9. CT head and CTA head and neck had no obvious abnormalities but showed diffuse edema in the b/l hemispheres. Patient was transferred to [facility] for higher level of care, patient was intubated for AMS.  neurology was consulted, and patient had LP performed that showed \"milky\" CSF, with elevated WBC and protein in CSF. MRI brain obtained showed  hyperintense signal within the subarachnoid spaces concerning for meningitis. MRI L spine showed fluid in the T2 thecal sc, consistent with meningitis. MRV head negative for venous thrombosis.  PCR panel + for strep pneumonia. Blood cultures with NGTD.  ID consulted, patient was sarted on vanco and ceftriaxone, which was transitioned to  Penicillin G x 14 days, stop date is 9/26. Patient was extubated on 9/14, and he currently remains on dexamethasone taper.     Patient seen and examined at bedside with wife. Reports some grogginess, but  patient reports he feels better, however feels wobbly when he gets up to the bathroom.  Does not report HA, lightheadedness, SOB, CP, abdominal pain, or changes in numbness/tingling/weakness.         Social Hx:  Patient lives in [place] in a 2 story house with flight of stairs to access bathroom with shower and bedroom    FOS  MALIK  At prior level of function patient was Independent with mobility and ADLs.       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    TECHNIQUE/EXAM DESCRIPTION:  MRI of the lumbar spine without and with contrast.    The study was performed on a [employer] 1.5 Blanca MRI scanner.  T1 sagittal, T2 fast spin-echo sagittal, and T2 axial images were obtained of the lumbar spine. T1 post-contrast fat-suppressed sagittal images were obtained. Optional T2 fat-suppressed sagittal and T1 post-contrast axial images may be obtained.    15 mL ProHance gadolinium contrast was administered intravenously.    COMPARISON:  MRI thoracic spine from today's date    FINDINGS:  Alignment in the lumbar spine is normal. Marrow signal in the vertebral bodies is normal. There is no abnormal osseous enhancement. There is no evidence of discitis or osteomyelitis. There is no evidence of epidural phlegmon or epidural abscess.    The conus is normal in position and signal with its tip at the L2 level. There is no abnormal cord enhancement. There is no abnormal intradural extra medullary enhancement. There is no abnormal enhancement of nerve roots of the cauda equina.    However, there is T2 hypointense fluid-fluid level in the dependent caudal thecal sac. In keeping with history of meningitis, this may represent intradural exudate. Dependently layering subarachnoid hemorrhage within the thecal sac could have a similar   appearance. (2 sagittal image 8, series 17, T2 fat-suppressed sagittal image 8, series 20, T2 axial image 4, series 22).    At T12-L1, no abnormality.    At L1-2, no  abnormality.    At L2-3, no abnormality.    At L3-4, no abnormality.    At L4-5, there is a tiny midline disc bulge with an underlying tiny annular fissure (T2 axial image 13, series 21). No central or foraminal stenosis.    At L5-S1, there is a large left paramedian disc protrusion/extrusion. There is prominent impingement on the left ventral thecal sac. There is marked left lateral recess stenosis with posterior displacement of the emerging S1 nerve root (T2 axial 9,   series 21). There is mild hypertrophic facet arthropathy. There is mild right foraminal stenosis and moderate left stenosis.  Impression: 1.  T2 hypointense fluid-fluid level in the dependent caudal thecal sac in keeping with the history of meningitis. This likely represents intradural exudate in the setting of meningitis. Dependently layering subarachnoid hemorrhage within the thecal sac   could have a similar appearance.  2.  L4-5 tiny midline disc bulge with underlying tiny annular fissure. No central or foraminal stenosis.  3.  L5-S1 large left paramedian disc protrusion/extrusion with marked compromise of the emerging left S1 nerve root. Mild right foraminal stenosis. Moderate left foraminal stenosis.  MR-THORACIC SPINE-WITH & W/O  Narrative: 9/13/2024 4:52 PM    HISTORY/REASON FOR EXAM:  Back pain with known meningitis.    TECHNIQUE/EXAM DESCRIPTION:  MRI of the thoracic spine without and with contrast.    The study was performed on a [employer] 1.5 Blanca MRI scanner.  T1 sagittal, T2 fast spin-echo sagittal, and T2 axial images were obtained of the thoracic spine. T1 post-contrast fat suppressed sagittal images were obtained. Optional T1 post-contrast axial images may be obtained.    15 mL ProHance gadolinium contrast was administered intravenously.    COMPARISON:  MRI lumbar spine from today's date    FINDINGS:  Alignment in the thoracic spine is normal. Marrow signal in the vertebral bodies is normal. There is no abnormal osseous  enhancement. There is no evidence of discitis or osteomyelitis.    The prevertebral and paraspinous soft tissues are unremarkable except for bibasilar atelectatic infiltrates and tiny bilateral pleural effusions.    The thoracic spinal cord is normal in caliber and signal throughout its course. There is no abnormal cord enhancement. There is no abnormal intradural extra medullary enhancement.    There is no significant disc bulge or protrusion, central stenosis, or foraminal stenosis at any thoracic level.  Impression: MRI OF THE THORACIC SPINE WITHOUT AND WITH CONTRAST WITHIN NORMAL LIMITS.  MR-CERVICAL SPINE-WITH & W/O  Narrative: 9/13/2024 4:52 PM    HISTORY/REASON FOR EXAM:  Back pain with known meningitis.    TECHNIQUE/EXAM DESCRIPTION:  MRI of the cervical spine without and with contrast.    The study was performed on a [employer] 1.5 Blnaca MRI scanner.  T1 sagittal, T2 fast spin-echo sagittal, and gradient echo axial images were obtained of the cervical spine. Optional T2 fat-suppressed sagittal images may also be obtained. T1 post-contrast fat suppressed sagittal images were obtained of the cervical   spine. Optional T1 post-contrast axial images may be obtained.    15 mL ProHance gadolinium contrast was administered intravenously.    COMPARISON: CT cervical spine outside films 9/12/2024    FINDINGS:  Alignment in the cervical spine shows no segmental subluxation.    Marrow signal in the vertebral bodies is normal. There is no abnormal osseous enhancement.    The prevertebral soft tissues are unremarkable except for some secretions in the pharynx with endotracheal tube and enteric tube in situ.    There are no anomalies at the craniovertebral junction.  The cervical spinal cord is normal in caliber and signal throughout its course. There is no abnormal cord enhancement. There is no abnormal intradural extra medullary enhancement.    At C2-3, no abnormality.    At C3-4, no abnormality.    At C4-5, no  abnormality.    At C5-6, minimal disc-osteophyte complex. Some thinning of ventral subarachnoid space. Dorsal subarachnoid space remains intact. There is no central stenosis. The neural foramina are intact.    At C6-7, no significant disc bulge or protrusion. No central stenosis. The left neural foramen is intact. There is moderate-marked right foraminal stenosis due to spondylotic change.    At C7-T1, no abnormality.  Impression: 1.  C5-6 minimal midline disc/osteophyte complex. No central or foraminal stenosis.  2.  C6-C7 moderate-marked right foraminal stenosis due to spondylotic change.  3.  No myelopathic cord signal. Concerning the history of meningitis, no abnormal intradural extra medullary enhancement.  MR-VENOGRAM (MRV) HEAD  Narrative: 9/12/2024 8:35 PM    HISTORY/REASON FOR EXAM:  ALTERED LEVEL OF CONSCIOUSNESS.    TECHNIQUE/EXAM DESCRIPTION:  MR venogram of the cerebral veins-time-of-flight sequences COMPARISON:  None    FINDINGS:  The superior sagittal sinus is widely patent. There is widely patent dominant RIGHT transverse and sigmoid sinuses. There is hypoplastic LEFT transverse and sigmoid sinuses.  Impression: Unremarkable MR venogram of the cerebral veins.  MR-BRAIN-WITH & W/O  Narrative: 9/12/2024 8:35 PM    HISTORY/REASON FOR EXAM:  ALTERED LEVEL OF CONSCIOUSNESS.    TECHNIQUE/EXAM DESCRIPTION: MR brain with and without contrast.    Multiplanar multisequence MR examination of the brain with and without contrast done on 1.5 MRI scanner.    15 mL ProHance contrast was administered intravenously.    COMPARISON:  None.    FINDINGS: The FLAIR images demonstrates diffuse hyperintense signal within the subarachnoid spaces. There is also minimal abnormal ependymal T2 hyperintensity. There is no hydrocephalus. There is no abnormal contrast enhancement.    There is no restricted diffusion, acute hemorrhage, hydrocephalus, intracranial space-occupying lesion, abnormal volume loss, vasogenic edema or mass  "effect. There is mild tonsillar ectopia. The gray matter structures are unremarkable. The supra and   infratentorial white matter is unremarkable.  There is no subdural or epidural space-occupying process.    The visualized flow voids of the cerebral vasculature are unremarkable.  There is no large lesion identified in the expected course of the intracranial portions of the cranial nerves.  The visualized bones, muscles, adipose tissue and the glands are unremarkable. There is mucosal thickening in the paranasal sinuses.  Impression: 1.  The FLAIR images demonstrates diffuse hyperintense signal within the subarachnoid spaces. There is also minimal abnormal ependymal T2 hyperintensity. There is no hydrocephalus. There is no abnormal contrast enhancement. This finding is concerning for   acute Meningitis/meningeal inflammation. Please correlate with the CSF study.  2.  Mild tonsillar ectopia.        PROCEDURES:  None     PMH:  No past medical history on file.    PSH:  No past surgical history on file.    FHX:  No family history on file.    Medications:  Current Facility-Administered Medications   Medication Dose    penicillin G potassium 4 Million Units in  mL IVPB  4 Million Units    senna-docusate (Pericolace Or Senokot S) 8.6-50 MG per tablet 2 Tablet  2 Tablet    And    polyethylene glycol/lytes (Miralax) Packet 1 Packet  1 Packet    acetaminophen (Tylenol) tablet 650 mg  650 mg    acetaminophen (Tylenol) suppository 650 mg  650 mg    Respiratory Therapy Consult      hydrALAZINE (Apresoline) injection 10 mg  10 mg    labetalol (Normodyne/Trandate) injection 10 mg  10 mg    dexamethasone (Decadron) injection 10 mg  10 mg    ondansetron (Zofran) syringe/vial injection 4 mg  4 mg    enoxaparin (Lovenox) inj 40 mg  40 mg       Allergies:  No Known Allergies      Physical Exam:  Vitals: /77   Pulse (!) 43   Temp 36.7 °C (98.1 °F) (Temporal)   Resp 18   Ht 1.753 m (5' 9\")   Wt 85.6 kg (188 lb 11.4 oz)  "  SpO2 96%   Gen: NAD, laying comfortably in bed, wife at bedside   Head:  NC/AT  Eyes/ Nose/ Mouth: PERRLA, moist mucous membranes  Cardio: RRR, good distal perfusion, warm extremities  Pulm: normal respiratory effort, no cyanosis, on 2 L   Abd: Soft NTND, negative borborygmi   Ext: No peripheral edema. No calf tenderness. No clubbing.    Mental status:  A&Ox4 (person, place, date, situation) answers questions appropriately follows commands,   Speech: fluent, no aphasia or dysarthria    CRANIAL NERVES:  2,3: visual acuity grossly intact, PERRL  3,4,6: EOMI bilaterally, no nystagmus or diplopia  5: sensation intact to light touch bilaterally and symmetric  7: no facial asymmetry  8: hearing grossly intact    Motor:      Upper Extremity  Myotome R L   Shoulder flexion C5 5/5 5/5   Elbow flexion C5 5/5 5/5   Wrist extension C6 5/5 5/5   Elbow extension C7 5/5 5/5   Finger flexion C8 5/5 5/5   Finger abduction T1 5/5 5/5     Lower Extremity Myotome R L   Hip flexion L2 5/5 5/5   Knee extension L3 5/5 5/5   Ankle dorsiflexion L4 5/5 5/5   Toe extension L5 5/5 5/5   Ankle plantarflexion S1 5/5 5/5       Negative Pronator drift bilaterally    Sensory:   intact to light touch through out    DTRs: 2+ in bilateral  biceps  No clonus at bilateral ankles  Negative Lopez b/l     Tone: no spasticity noted    Coordination:   intact finger to nose bilaterally, but delayed with LUE   intact fine motor with fingers bilaterally     Labs: Reviewed and significant for   Recent Labs     09/14/24  0628 09/15/24  0710 09/16/24  0444   RBC 4.38* 3.72* 4.10*   HEMOGLOBIN 12.8* 10.9* 12.4*   HEMATOCRIT 39.4* 33.6* 35.8*   PLATELETCT 141* 156* 184     Recent Labs     09/14/24  0628 09/15/24  0710 09/16/24  0444   SODIUM 143 142 140   POTASSIUM 4.6 3.8 4.2   CHLORIDE 110 108 105   CO2 20 24 24   GLUCOSE 165* 147* 166*   BUN 12 15 21   CREATININE 0.68 0.85 0.93   CALCIUM 8.6 7.9* 8.1*     Recent Results (from the past 24 hour(s))   EKG     Collection Time: 09/15/24  5:48 PM   Result Value Ref Range    Report       [facility]    Test Date:  2024-09-15  Pt Name:    JENNIFER COLEY                 Department: [facility]  MRN:        [id number]                      Room:       S196  Gender:     Male                         Technician: TORIE  :        1990                   Requested By:LAURE MARX  Order #:    [id number]                    Reading MD: Jeremy Fuentes MD    Measurements  Intervals                                Axis  Rate:       41                           P:          14  CO:         150                          QRS:        23  QRSD:       87                           T:          39  QT:         440  QTc:        364    Interpretive Statements  Sinus bradycardia  Compared to ECG 2024 18:36:52  Sinus tachycardia no longer present  Electronically Signed On 09- 18:11:19 PDT by Jeremy Fuentes MD     CBC WITH DIFFERENTIAL    Collection Time: 24  4:44 AM   Result Value Ref Range    WBC 8.4 4.8 - 10.8 K/uL    RBC 4.10 (L) 4.70 - 6.10 M/uL    Hemoglobin 12.4 (L) 14.0 - 18.0 g/dL    Hematocrit 35.8 (L) 42.0 - 52.0 %    MCV 87.3 81.4 - 97.8 fL    MCH 30.2 27.0 - 33.0 pg    MCHC 34.6 32.3 - 36.5 g/dL    RDW 40.2 35.9 - 50.0 fL    Platelet Count 184 164 - 446 K/uL    MPV 10.1 9.0 - 12.9 fL    Neutrophils-Polys 85.90 (H) 44.00 - 72.00 %    Lymphocytes 8.30 (L) 22.00 - 41.00 %    Monocytes 4.40 0.00 - 13.40 %    Eosinophils 0.00 0.00 - 6.90 %    Basophils 0.20 0.00 - 1.80 %    Immature Granulocytes 1.20 (H) 0.00 - 0.90 %    Nucleated RBC 0.00 0.00 - 0.20 /100 WBC    Neutrophils (Absolute) 7.24 1.82 - 7.42 K/uL    Lymphs (Absolute) 0.70 (L) 1.00 - 4.80 K/uL    Monos (Absolute) 0.37 0.00 - 0.85 K/uL    Eos (Absolute) 0.00 0.00 - 0.51 K/uL    Baso (Absolute) 0.02 0.00 - 0.12 K/uL    Immature Granulocytes (abs) 0.10 0.00 - 0.11 K/uL    NRBC (Absolute) 0.00 K/uL   Comp Metabolic Panel    Collection Time: 24  4:44 AM    Result Value Ref Range    Sodium 140 135 - 145 mmol/L    Potassium 4.2 3.6 - 5.5 mmol/L    Chloride 105 96 - 112 mmol/L    Co2 24 20 - 33 mmol/L    Anion Gap 11.0 7.0 - 16.0    Glucose 166 (H) 65 - 99 mg/dL    Bun 21 8 - 22 mg/dL    Creatinine 0.93 0.50 - 1.40 mg/dL    Calcium 8.1 (L) 8.5 - 10.5 mg/dL    Correct Calcium 8.8 8.5 - 10.5 mg/dL    AST(SGOT) 34 12 - 45 U/L    ALT(SGPT) 47 2 - 50 U/L    Alkaline Phosphatase 39 30 - 99 U/L    Total Bilirubin 0.7 0.1 - 1.5 mg/dL    Albumin 3.1 (L) 3.2 - 4.9 g/dL    Total Protein 5.3 (L) 6.0 - 8.2 g/dL    Globulin 2.2 1.9 - 3.5 g/dL    A-G Ratio 1.4 g/dL   MAGNESIUM    Collection Time: 09/16/24  4:44 AM   Result Value Ref Range    Magnesium 2.2 1.5 - 2.5 mg/dL   PHOSPHORUS    Collection Time: 09/16/24  4:44 AM   Result Value Ref Range    Phosphorus 3.1 2.5 - 4.5 mg/dL   ESTIMATED GFR    Collection Time: 09/16/24  4:44 AM   Result Value Ref Range    GFR (CKD-EPI) 110 >60 mL/min/1.73 m 2         ASSESSMENT:  Patient is a 34 y.o. male admitted with meningitis      [facility] Code / Diagnosis to Support: 0002.1 - Brain Dysfunction: Non-Traumatic  See DISPO details below for recommendations on appropriate level of rehab for this diagnosis.    Barriers to transfer include: Insurance authorization, TCCs to verify disposition, medical clearance and bed availability     Assessment and Plan:  Encephalopathy   Bacterial Meningitis   - admitted with HA and AMS   - LP noted to have mild CSF   - CSF with elevated WBC and protein   - PCR + for strep pneumonia   - MRI brain with diffuse hyperintense signal with subarachnoid spaces consistent with meningitis   - ID consulted   - on Penicillin G q4hrs , stop date 9/27   - continue with PT/OT/SLP     Respiratory failure   - intubated for AMS   - 9/14 extubated   - weaned down to 2 L     Hyperglycemia   - BG elevated up to 160  - likely secondary to dexamethasone       DISPO:  - patient is currently functioning below their level of baseline,  recommend post acute rehab  - anticipated to be appropriate IRF level therapy with 3hr of therapy 5 days per week   - prior to acceptance to IRF, will need insurance auth from [facility] [place] and updated therapy notes    - TCC to assist with insurance auth and DC support from wife        Medical Complexity:  Encephalopathy   Meningitis   Respiratory failure   Hyperglycemia   Impaired mobility and ADLs       DVT PPX: Lovenox       Thank you for allowing us to participate in the care of this patient.     Patient was seen for >80 minutes on unit/floor of which > 50% of time was spent on counseling and coordination of care regarding the above, including prognosis, risk reduction, benefits of treatment, and options for next stage of care.    Aleah Lee D.O.   Physical Medicine and Rehabilitation     Please note that this dictation was created using voice recognition software. I have made every reasonable attempt to correct obvious errors, but there may be errors of grammar and possibly content that I did not discover before finalizing the note.

## 2024-09-16 NOTE — PROGRESS NOTES
Patient transported to [facility] with this RN and CCT. Medications transported with patient. PCN tubed via tube station. All belongings with patient.

## 2024-09-16 NOTE — PROGRESS NOTES
4 Eyes Skin Assessment Completed by MANE Zhu and MANE Neil.    Head WDL  Ears WDL  Nose WDL  Mouth WDL  Neck WDL  Breast/Chest WDL  Shoulder Blades WDL  Spine WDL  (R) Arm/Elbow/Hand WDL  (L) Arm/Elbow/Hand WDL  Abdomen WDL  Groin WDL  Scrotum/Coccyx/Buttocks WDL  (R) Leg WDL  (L) Leg WDL  (R) Heel/Foot/Toe WDL  (L) Heel/Foot/Toe WDL    Devices In Places Pulse Ox and Nasal Cannula, Tele Leads/Box      Interventions In Place Pillows and Pressure Redistribution Mattress    Possible Skin Injury No    Pictures Uploaded Into Epic N/A  Wound Consult Placed N/A  RN Wound Prevention Protocol Ordered No

## 2024-09-17 ENCOUNTER — APPOINTMENT (OUTPATIENT)
Dept: RADIOLOGY | Facility: MEDICAL CENTER | Age: 34
DRG: 871 | End: 2024-09-17
Attending: INTERNAL MEDICINE
Payer: OTHER GOVERNMENT

## 2024-09-17 LAB
ALBUMIN SERPL BCP-MCNC: 3.3 G/DL (ref 3.2–4.9)
BACTERIA BLD CULT: NORMAL
BACTERIA BLD CULT: NORMAL
BASOPHILS # BLD AUTO: 0.1 % (ref 0–1.8)
BASOPHILS # BLD: 0.01 K/UL (ref 0–0.12)
BUN SERPL-MCNC: 23 MG/DL (ref 8–22)
CALCIUM ALBUM COR SERPL-MCNC: 9.2 MG/DL (ref 8.5–10.5)
CALCIUM SERPL-MCNC: 8.6 MG/DL (ref 8.5–10.5)
CHLORIDE SERPL-SCNC: 106 MMOL/L (ref 96–112)
CO2 SERPL-SCNC: 24 MMOL/L (ref 20–33)
CREAT SERPL-MCNC: 0.78 MG/DL (ref 0.5–1.4)
EOSINOPHIL # BLD AUTO: 0 K/UL (ref 0–0.51)
EOSINOPHIL NFR BLD: 0 % (ref 0–6.9)
ERYTHROCYTE [DISTWIDTH] IN BLOOD BY AUTOMATED COUNT: 38.9 FL (ref 35.9–50)
GFR SERPLBLD CREATININE-BSD FMLA CKD-EPI: 119 ML/MIN/1.73 M 2
GLUCOSE SERPL-MCNC: 159 MG/DL (ref 65–99)
HCT VFR BLD AUTO: 36.9 % (ref 42–52)
HGB BLD-MCNC: 12.6 G/DL (ref 14–18)
IMM GRANULOCYTES # BLD AUTO: 0.16 K/UL (ref 0–0.11)
IMM GRANULOCYTES NFR BLD AUTO: 2.4 % (ref 0–0.9)
LYMPHOCYTES # BLD AUTO: 0.65 K/UL (ref 1–4.8)
LYMPHOCYTES NFR BLD: 9.7 % (ref 22–41)
MAGNESIUM SERPL-MCNC: 2.2 MG/DL (ref 1.5–2.5)
MCH RBC QN AUTO: 29.6 PG (ref 27–33)
MCHC RBC AUTO-ENTMCNC: 34.1 G/DL (ref 32.3–36.5)
MCV RBC AUTO: 86.8 FL (ref 81.4–97.8)
MONOCYTES # BLD AUTO: 0.5 K/UL (ref 0–0.85)
MONOCYTES NFR BLD AUTO: 7.5 % (ref 0–13.4)
NEUTROPHILS # BLD AUTO: 5.36 K/UL (ref 1.82–7.42)
NEUTROPHILS NFR BLD: 80.3 % (ref 44–72)
NRBC # BLD AUTO: 0 K/UL
NRBC BLD-RTO: 0 /100 WBC (ref 0–0.2)
PHOSPHATE SERPL-MCNC: 3.9 MG/DL (ref 2.5–4.5)
PLATELET # BLD AUTO: 199 K/UL (ref 164–446)
PMV BLD AUTO: 10.1 FL (ref 9–12.9)
POTASSIUM SERPL-SCNC: 4.1 MMOL/L (ref 3.6–5.5)
RBC # BLD AUTO: 4.25 M/UL (ref 4.7–6.1)
SIGNIFICANT IND 70042: NORMAL
SIGNIFICANT IND 70042: NORMAL
SITE SITE: NORMAL
SITE SITE: NORMAL
SODIUM SERPL-SCNC: 142 MMOL/L (ref 135–145)
SOURCE SOURCE: NORMAL
SOURCE SOURCE: NORMAL
WBC # BLD AUTO: 6.7 K/UL (ref 4.8–10.8)

## 2024-09-17 PROCEDURE — 36415 COLL VENOUS BLD VENIPUNCTURE: CPT

## 2024-09-17 PROCEDURE — A9270 NON-COVERED ITEM OR SERVICE: HCPCS | Performed by: NURSE PRACTITIONER

## 2024-09-17 PROCEDURE — 700102 HCHG RX REV CODE 250 W/ 637 OVERRIDE(OP): Performed by: NURSE PRACTITIONER

## 2024-09-17 PROCEDURE — 97116 GAIT TRAINING THERAPY: CPT | Mod: CQ

## 2024-09-17 PROCEDURE — A9270 NON-COVERED ITEM OR SERVICE: HCPCS

## 2024-09-17 PROCEDURE — 85025 COMPLETE CBC W/AUTO DIFF WBC: CPT

## 2024-09-17 PROCEDURE — 700111 HCHG RX REV CODE 636 W/ 250 OVERRIDE (IP): Mod: JZ | Performed by: STUDENT IN AN ORGANIZED HEALTH CARE EDUCATION/TRAINING PROGRAM

## 2024-09-17 PROCEDURE — 05H933Z INSERTION OF INFUSION DEVICE INTO RIGHT BRACHIAL VEIN, PERCUTANEOUS APPROACH: ICD-10-PCS | Performed by: INTERNAL MEDICINE

## 2024-09-17 PROCEDURE — A9270 NON-COVERED ITEM OR SERVICE: HCPCS | Performed by: INTERNAL MEDICINE

## 2024-09-17 PROCEDURE — 700102 HCHG RX REV CODE 250 W/ 637 OVERRIDE(OP): Performed by: INTERNAL MEDICINE

## 2024-09-17 PROCEDURE — 700105 HCHG RX REV CODE 258: Performed by: INTERNAL MEDICINE

## 2024-09-17 PROCEDURE — 83735 ASSAY OF MAGNESIUM: CPT

## 2024-09-17 PROCEDURE — 700102 HCHG RX REV CODE 250 W/ 637 OVERRIDE(OP)

## 2024-09-17 PROCEDURE — 770020 HCHG ROOM/CARE - TELE (206)

## 2024-09-17 PROCEDURE — 97530 THERAPEUTIC ACTIVITIES: CPT | Mod: CQ

## 2024-09-17 PROCEDURE — 99233 SBSQ HOSP IP/OBS HIGH 50: CPT | Performed by: INTERNAL MEDICINE

## 2024-09-17 PROCEDURE — C1751 CATH, INF, PER/CENT/MIDLINE: HCPCS

## 2024-09-17 PROCEDURE — 700111 HCHG RX REV CODE 636 W/ 250 OVERRIDE (IP): Performed by: INTERNAL MEDICINE

## 2024-09-17 PROCEDURE — 80069 RENAL FUNCTION PANEL: CPT

## 2024-09-17 RX ADMIN — SODIUM CHLORIDE 4 MILLION UNITS: 9 INJECTION, SOLUTION INTRAVENOUS at 14:22

## 2024-09-17 RX ADMIN — Medication 10 MG: at 20:27

## 2024-09-17 RX ADMIN — SODIUM CHLORIDE 4 MILLION UNITS: 9 INJECTION, SOLUTION INTRAVENOUS at 22:18

## 2024-09-17 RX ADMIN — ENOXAPARIN SODIUM 40 MG: 100 INJECTION SUBCUTANEOUS at 17:42

## 2024-09-17 RX ADMIN — SODIUM CHLORIDE 4 MILLION UNITS: 9 INJECTION, SOLUTION INTRAVENOUS at 17:43

## 2024-09-17 RX ADMIN — SODIUM CHLORIDE 4 MILLION UNITS: 9 INJECTION, SOLUTION INTRAVENOUS at 09:28

## 2024-09-17 RX ADMIN — SODIUM CHLORIDE 4 MILLION UNITS: 9 INJECTION, SOLUTION INTRAVENOUS at 03:03

## 2024-09-17 RX ADMIN — SODIUM CHLORIDE 4 MILLION UNITS: 9 INJECTION, SOLUTION INTRAVENOUS at 06:31

## 2024-09-17 RX ADMIN — CHLORPROMAZINE HYDROCHLORIDE 12.5 MG: 25 TABLET, FILM COATED ORAL at 09:30

## 2024-09-17 RX ADMIN — ACETAMINOPHEN 650 MG: 325 TABLET ORAL at 10:59

## 2024-09-17 ASSESSMENT — FIBROSIS 4 INDEX: FIB4 SCORE: 0.87

## 2024-09-17 ASSESSMENT — ENCOUNTER SYMPTOMS
MYALGIAS: 0
CHILLS: 0
FALLS: 0
HEADACHES: 0
FEVER: 0
NERVOUS/ANXIOUS: 0
DOUBLE VISION: 0
BACK PAIN: 0
PALPITATIONS: 0
NAUSEA: 0
BLURRED VISION: 0
CONSTIPATION: 0
DIARRHEA: 0
COUGH: 0
ABDOMINAL PAIN: 0
VOMITING: 0

## 2024-09-17 ASSESSMENT — GAIT ASSESSMENTS
DEVIATION: SHUFFLED GAIT
GAIT LEVEL OF ASSIST: MINIMAL ASSIST
ASSISTIVE DEVICE: FRONT WHEEL WALKER
DISTANCE (FEET): 70

## 2024-09-17 ASSESSMENT — COGNITIVE AND FUNCTIONAL STATUS - GENERAL
SUGGESTED CMS G CODE MODIFIER MOBILITY: CK
MOVING TO AND FROM BED TO CHAIR: A LITTLE
WALKING IN HOSPITAL ROOM: A LITTLE
MOBILITY SCORE: 17
STANDING UP FROM CHAIR USING ARMS: A LITTLE
TURNING FROM BACK TO SIDE WHILE IN FLAT BAD: A LITTLE
CLIMB 3 TO 5 STEPS WITH RAILING: A LOT
MOVING FROM LYING ON BACK TO SITTING ON SIDE OF FLAT BED: A LITTLE

## 2024-09-17 ASSESSMENT — PAIN DESCRIPTION - PAIN TYPE: TYPE: ACUTE PAIN

## 2024-09-17 NOTE — PROGRESS NOTES
Hospital Medicine Daily Progress Note    Date of Service  9/17/2024    Chief Complaint  Kwesi Rivera is a 34 y.o. male admitted 9/12/2024 with altered mentation    Hospital Course  34 y.o. male who presented 9/12/2024 with no significant past medical history, presented to outside facility with altered mentation. The patient presented to the sending facility with headaches, he was nonverbal at the time, GCS of 9, there were no focal deficits, the patient was unable to follow commands, a CT a CTA of the head showed some diffuse edema bilateral in the hemispheres. He was transferred for further neurological evaluation. Intubated on arrival and underwent a lumbar puncture with milky appearing CSF, PCR showed strep pneumo with greater than 15,000 white cell count, greater than 600 protein on CSF, the patient was admitted to ICU, started on vancomycin, ceftriaxone, steroids. MRI brain performed without evidence of venous thrombosis. Infectious disease consulted, the patient was transition to high-dose penicillin.     Interval Problem Update  Patient was seen and examined at bedside.  No acute events overnight. Patient is resting comfortably in bed and in no acute distress. Wife updated at bedside.     Completed steroid therapy  Antibiotics through 09/26  Midline placed  ID recs  Plan for discharge to [facility]    I have discussed this patient's plan of care and discharge plan at IDT rounds today with Case Management, Nursing, Nursing leadership, and other members of the IDT team.    Consultants/Specialty  infectious disease    Code Status  Full Code    Disposition  The patient is not medically cleared for discharge to home or a post-acute facility.      I have placed the appropriate orders for post-discharge needs.    Review of Systems  Review of Systems   Constitutional:  Negative for chills and fever.   HENT:  Negative for congestion.    Eyes:  Negative for blurred vision and double vision.   Respiratory:  Negative for cough.     Cardiovascular:  Negative for chest pain and palpitations.   Gastrointestinal:  Negative for nausea and vomiting.   Genitourinary:  Negative for dysuria and frequency.   Musculoskeletal:  Negative for back pain and falls.   Neurological:  Negative for headaches.        Physical Exam  Temp:  [36.4 °C (97.5 °F)-36.7 °C (98 °F)] 36.5 °C (97.7 °F)  Pulse:  [44-49] 45  Resp:  [16-20] 18  BP: (119-128)/(59-67) 120/64  SpO2:  [93 %-95 %] 94 %    Physical Exam  Vitals and nursing note reviewed.   Constitutional:       General: He is not in acute distress.  HENT:      Right Ear: External ear normal.      Left Ear: External ear normal.      Nose: No congestion.      Mouth/Throat:      Pharynx: No oropharyngeal exudate.   Eyes:      General: No scleral icterus.  Cardiovascular:      Rate and Rhythm: Bradycardia present.      Heart sounds: No murmur heard.  Pulmonary:      Breath sounds: No wheezing.   Abdominal:      Tenderness: There is no abdominal tenderness. There is no guarding or rebound.   Musculoskeletal:         General: No swelling or deformity.   Skin:     Coloration: Skin is not jaundiced.      Findings: No bruising.   Neurological:      General: No focal deficit present.      Mental Status: He is alert.      Motor: No weakness.   Psychiatric:         Mood and Affect: Mood normal.         Behavior: Behavior normal.     Patient was seen and examined at bedside. No changes in physical exam from prior evaluation.      Fluids    Intake/Output Summary (Last 24 hours) at 9/17/2024 1605  Last data filed at 9/17/2024 1013  Gross per 24 hour   Intake 1427.28 ml   Output --   Net 1427.28 ml        Laboratory  Recent Labs     09/15/24  0710 09/16/24  0444 09/17/24  0109   WBC 8.6 8.4 6.7   RBC 3.72* 4.10* 4.25*   HEMOGLOBIN 10.9* 12.4* 12.6*   HEMATOCRIT 33.6* 35.8* 36.9*   MCV 90.3 87.3 86.8   MCH 29.3 30.2 29.6   MCHC 32.4 34.6 34.1   RDW 43.1 40.2 38.9   PLATELETCT 156* 184 199   MPV 10.1 10.1 10.1     Recent Labs      09/15/24  0710 09/16/24  0444 09/17/24  0109   SODIUM 142 140 142   POTASSIUM 3.8 4.2 4.1   CHLORIDE 108 105 106   CO2 24 24 24   GLUCOSE 147* 166* 159*   BUN 15 21 23*   CREATININE 0.85 0.93 0.78   CALCIUM 7.9* 8.1* 8.6                     Imaging  IR-MIDLINE CATHETER INSERTION WO GUIDANCE > AGE 3   Final Result                  Ultrasound-guided midline placement performed by qualified nursing staff    as above.          MR-LUMBAR SPINE-WITH & W/O   Final Result      1.  T2 hypointense fluid-fluid level in the dependent caudal thecal sac in keeping with the history of meningitis. This likely represents intradural exudate in the setting of meningitis. Dependently layering subarachnoid hemorrhage within the thecal sac    could have a similar appearance.   2.  L4-5 tiny midline disc bulge with underlying tiny annular fissure. No central or foraminal stenosis.   3.  L5-S1 large left paramedian disc protrusion/extrusion with marked compromise of the emerging left S1 nerve root. Mild right foraminal stenosis. Moderate left foraminal stenosis.      MR-THORACIC SPINE-WITH & W/O   Final Result      MRI OF THE THORACIC SPINE WITHOUT AND WITH CONTRAST WITHIN NORMAL LIMITS.      MR-CERVICAL SPINE-WITH & W/O   Final Result      1.  C5-6 minimal midline disc/osteophyte complex. No central or foraminal stenosis.   2.  C6-C7 moderate-marked right foraminal stenosis due to spondylotic change.   3.  No myelopathic cord signal. Concerning the history of meningitis, no abnormal intradural extra medullary enhancement.      MR-BRAIN-WITH & W/O   Final Result      1.  The FLAIR images demonstrates diffuse hyperintense signal within the subarachnoid spaces. There is also minimal abnormal ependymal T2 hyperintensity. There is no hydrocephalus. There is no abnormal contrast enhancement. This finding is concerning for    acute Meningitis/meningeal inflammation. Please correlate with the CSF study.   2.  Mild tonsillar ectopia.     "  MR-VENOGRAM (MRV) HEAD   Final Result      Unremarkable MR venogram of the cerebral veins.      DX-CHEST-PORTABLE (1 VIEW)   Final Result         No acute cardiac or pulmonary abnormality is identified.           Assessment/Plan  * Streptococcus pneumoniae meningitis- (present on admission)  Assessment & Plan  -presented with AMS and headaches  -LP done in ED \"milky\" showed greater than 15,000 WBC and greater than 600 protein  -PCR positive Streptococcus pneumoniae  -ID consulted, transition to penicillin IV every 4 hours, end date 9/26    Completed steroid therapy  Antibiotics through 09/26  Midline placed  ID recs  Plan for discharge to [facility]    Lumbar disc herniation  Assessment & Plan  -Patient complained of back pain prior to presentation  -MRI showed disc herniation L5-S1  -Multimodal pain management  -Outpatient neurosurgery follow-up    Acute encephalopathy  Assessment & Plan  -Due to bacterial meningitis  -Keep patient awake during the day and avoid daytime naps. Remove all unnecessary lines (central lines, peripheral IVs, feeding tubes, ho catheters).  -Avoid polypharmacy, frequent re-orientation, maximize family time at bedside, use glasses and hearing aids if needed, treat pain, encourage ambulation, minimize benzos/anticholinergic agents.   -Aspiration precautions  -Seizure precautions  -Fall precautions    Bradycardia  Assessment & Plan  -asymptomatic  -hemodynamically stable  -telemetry    Sepsis due to Streptococcus pneumoniae with encephalopathy without septic shock (HCC)  Assessment & Plan  This is Sepsis Present on admission    On mechanically assisted ventilation (HCC)  Assessment & Plan  -extubated 9/14           VTE prophylaxis:    enoxaparin ppx      I have performed a physical exam and reviewed and updated ROS and Plan today (9/17/2024). In review of yesterday's note (9/16/2024), there are no changes except as documented above.    Greater than 53 minutes spent prepping to see patient (e.g. " review of tests) obtaining and/or reviewing separately obtained history. Performing a medically appropriate examination and/ evaluation.  Counseling and educating the patient/family/caregiver.  Ordering medications, tests, or procedures.  Referring and communicating with other health care professionals.  Documenting clinical information in EPIC.  Independently interpreting results and communicating results to patient/family/caregiver.  Care coordination.

## 2024-09-17 NOTE — PROGRESS NOTES
Infectious Disease Progress Note    Author: Carol Rousseau M.D. Date & Time of service: 2024  8:19 AM    Chief Complaint:  Streptococcus pneumoniae meningitis      Interval History:   AF, O2 Vent 8/30%, minimal vent settings, no pressors.   9/15 AF, O2 RA, sitting up in bed, still somewhat confused but is following simple commands.  Denies any headache or neck pain with movement.     AF, O2 2 L NC, improved today, some ongoing dizziness but overall mentation is better.     Review of Systems:  Review of Systems   Gastrointestinal:  Negative for abdominal pain, constipation, diarrhea, nausea and vomiting.   Musculoskeletal:  Negative for myalgias.   Neurological:  Negative for headaches.   Psychiatric/Behavioral:  The patient is not nervous/anxious.        Hemodynamics:  Temp (24hrs), Av.6 °C (97.8 °F), Min:36.4 °C (97.5 °F), Max:36.7 °C (98 °F)  Temperature: 36.4 °C (97.5 °F)  Pulse  Av  Min: 20  Max: 117   Blood Pressure: 121/64       Physical Exam:  Physical Exam  Cardiovascular:      Rate and Rhythm: Normal rate.   Pulmonary:      Effort: Pulmonary effort is normal.   Abdominal:      General: Abdomen is flat.   Musculoskeletal:         General: Normal range of motion.   Skin:     General: Skin is warm.   Neurological:      General: No focal deficit present.      Mental Status: He is oriented to person, place, and time.   Psychiatric:         Mood and Affect: Mood normal.         Behavior: Behavior normal.         Meds:    Current Facility-Administered Medications:     chlorproMAZINE    melatonin    penicillin g    senna-docusate **AND** polyethylene glycol/lytes    acetaminophen    acetaminophen (TYLENOL) suppository    Respiratory Therapy Consult    hydrALAZINE    labetalol    ondansetron    enoxaparin (LOVENOX) injection    Labs:  Recent Labs     09/15/24  0710 24  0444 24  0109   WBC 8.6 8.4 6.7   RBC 3.72* 4.10* 4.25*   HEMOGLOBIN 10.9* 12.4* 12.6*   HEMATOCRIT 33.6*  35.8* 36.9*   MCV 90.3 87.3 86.8   MCH 29.3 30.2 29.6   RDW 43.1 40.2 38.9   PLATELETCT 156* 184 199   MPV 10.1 10.1 10.1   NEUTSPOLYS  --  85.90* 80.30*   LYMPHOCYTES  --  8.30* 9.70*   MONOCYTES  --  4.40 7.50   EOSINOPHILS  --  0.00 0.00   BASOPHILS  --  0.20 0.10     Recent Labs     09/15/24  0710 09/16/24  0444 09/17/24  0109   SODIUM 142 140 142   POTASSIUM 3.8 4.2 4.1   CHLORIDE 108 105 106   CO2 24 24 24   GLUCOSE 147* 166* 159*   BUN 15 21 23*     Recent Labs     09/15/24  0710 09/16/24  0444 09/17/24  0109   ALBUMIN 2.7* 3.1* 3.3   TBILIRUBIN 0.6 0.7  --    ALKPHOSPHAT 31 39  --    TOTPROTEIN 4.8* 5.3*  --    ALTSGPT 14 47  --    ASTSGOT 13 34  --    CREATININE 0.85 0.93 0.78       Imaging:  MR-LUMBAR SPINE-WITH & W/O    Result Date: 9/13/2024 9/13/2024 4:53 PM HISTORY/REASON FOR EXAM:  Back pain with known meningitis. TECHNIQUE/EXAM DESCRIPTION: MRI of the lumbar spine without and with contrast. The study was performed on a [employer] Signa 1.5 Blanca MRI scanner. T1 sagittal, T2 fast spin-echo sagittal, and T2 axial images were obtained of the lumbar spine. T1 post-contrast fat-suppressed sagittal images were obtained. Optional T2 fat-suppressed sagittal and T1 post-contrast axial images may be obtained. 15 mL ProHance gadolinium contrast was administered intravenously. COMPARISON:  MRI thoracic spine from today's date FINDINGS: Alignment in the lumbar spine is normal. Marrow signal in the vertebral bodies is normal. There is no abnormal osseous enhancement. There is no evidence of discitis or osteomyelitis. There is no evidence of epidural phlegmon or epidural abscess. The conus is normal in position and signal with its tip at the L2 level. There is no abnormal cord enhancement. There is no abnormal intradural extra medullary enhancement. There is no abnormal enhancement of nerve roots of the cauda equina. However, there is T2 hypointense fluid-fluid level in the dependent caudal thecal sac. In keeping with  history of meningitis, this may represent intradural exudate. Dependently layering subarachnoid hemorrhage within the thecal sac could have a similar appearance. (2 sagittal image 8, series 17, T2 fat-suppressed sagittal image 8, series 20, T2 axial image 4, series 22). At T12-L1, no abnormality. At L1-2, no abnormality. At L2-3, no abnormality. At L3-4, no abnormality. At L4-5, there is a tiny midline disc bulge with an underlying tiny annular fissure (T2 axial image 13, series 21). No central or foraminal stenosis. At L5-S1, there is a large left paramedian disc protrusion/extrusion. There is prominent impingement on the left ventral thecal sac. There is marked left lateral recess stenosis with posterior displacement of the emerging S1 nerve root (T2 axial 9, series 21). There is mild hypertrophic facet arthropathy. There is mild right foraminal stenosis and moderate left stenosis.     1.  T2 hypointense fluid-fluid level in the dependent caudal thecal sac in keeping with the history of meningitis. This likely represents intradural exudate in the setting of meningitis. Dependently layering subarachnoid hemorrhage within the thecal sac could have a similar appearance. 2.  L4-5 tiny midline disc bulge with underlying tiny annular fissure. No central or foraminal stenosis. 3.  L5-S1 large left paramedian disc protrusion/extrusion with marked compromise of the emerging left S1 nerve root. Mild right foraminal stenosis. Moderate left foraminal stenosis.    MR-THORACIC SPINE-WITH & W/O    Result Date: 9/13/2024 9/13/2024 4:52 PM HISTORY/REASON FOR EXAM:  Back pain with known meningitis. TECHNIQUE/EXAM DESCRIPTION: MRI of the thoracic spine without and with contrast. The study was performed on a [employer] Signa 1.5 Blanca MRI scanner. T1 sagittal, T2 fast spin-echo sagittal, and T2 axial images were obtained of the thoracic spine. T1 post-contrast fat suppressed sagittal images were obtained. Optional T1 post-contrast axial  images may be obtained. 15 mL ProHance gadolinium contrast was administered intravenously. COMPARISON:  MRI lumbar spine from today's date FINDINGS: Alignment in the thoracic spine is normal. Marrow signal in the vertebral bodies is normal. There is no abnormal osseous enhancement. There is no evidence of discitis or osteomyelitis. The prevertebral and paraspinous soft tissues are unremarkable except for bibasilar atelectatic infiltrates and tiny bilateral pleural effusions. The thoracic spinal cord is normal in caliber and signal throughout its course. There is no abnormal cord enhancement. There is no abnormal intradural extra medullary enhancement. There is no significant disc bulge or protrusion, central stenosis, or foraminal stenosis at any thoracic level.     MRI OF THE THORACIC SPINE WITHOUT AND WITH CONTRAST WITHIN NORMAL LIMITS.    MR-CERVICAL SPINE-WITH & W/O    Result Date: 9/13/2024 9/13/2024 4:52 PM HISTORY/REASON FOR EXAM:  Back pain with known meningitis. TECHNIQUE/EXAM DESCRIPTION: MRI of the cervical spine without and with contrast. The study was performed on a [employer] Signa 1.5 Blanca MRI scanner. T1 sagittal, T2 fast spin-echo sagittal, and gradient echo axial images were obtained of the cervical spine. Optional T2 fat-suppressed sagittal images may also be obtained. T1 post-contrast fat suppressed sagittal images were obtained of the cervical spine. Optional T1 post-contrast axial images may be obtained. 15 mL ProHance gadolinium contrast was administered intravenously. COMPARISON: CT cervical spine outside films 9/12/2024 FINDINGS: Alignment in the cervical spine shows no segmental subluxation. Marrow signal in the vertebral bodies is normal. There is no abnormal osseous enhancement. The prevertebral soft tissues are unremarkable except for some secretions in the pharynx with endotracheal tube and enteric tube in situ. There are no anomalies at the craniovertebral junction.  The cervical spinal  cord is normal in caliber and signal throughout its course. There is no abnormal cord enhancement. There is no abnormal intradural extra medullary enhancement. At C2-3, no abnormality. At C3-4, no abnormality. At C4-5, no abnormality. At C5-6, minimal disc-osteophyte complex. Some thinning of ventral subarachnoid space. Dorsal subarachnoid space remains intact. There is no central stenosis. The neural foramina are intact. At C6-7, no significant disc bulge or protrusion. No central stenosis. The left neural foramen is intact. There is moderate-marked right foraminal stenosis due to spondylotic change. At C7-T1, no abnormality.     1.  C5-6 minimal midline disc/osteophyte complex. No central or foraminal stenosis. 2.  C6-C7 moderate-marked right foraminal stenosis due to spondylotic change. 3.  No myelopathic cord signal. Concerning the history of meningitis, no abnormal intradural extra medullary enhancement.    MR-VENOGRAM (MRV) HEAD    Result Date: 9/13/2024 9/12/2024 8:35 PM HISTORY/REASON FOR EXAM:  ALTERED LEVEL OF CONSCIOUSNESS. TECHNIQUE/EXAM DESCRIPTION: MR venogram of the cerebral veins-time-of-flight sequences COMPARISON:  None FINDINGS: The superior sagittal sinus is widely patent. There is widely patent dominant RIGHT transverse and sigmoid sinuses. There is hypoplastic LEFT transverse and sigmoid sinuses.     Unremarkable MR venogram of the cerebral veins.    MR-BRAIN-WITH & W/O    Result Date: 9/13/2024 9/12/2024 8:35 PM HISTORY/REASON FOR EXAM:  ALTERED LEVEL OF CONSCIOUSNESS. TECHNIQUE/EXAM DESCRIPTION: MR brain with and without contrast. Multiplanar multisequence MR examination of the brain with and without contrast done on 1.5 MRI scanner. 15 mL ProHance contrast was administered intravenously. COMPARISON:  None. FINDINGS: The FLAIR images demonstrates diffuse hyperintense signal within the subarachnoid spaces. There is also minimal abnormal ependymal T2 hyperintensity. There is no hydrocephalus.  There is no abnormal contrast enhancement. There is no restricted diffusion, acute hemorrhage, hydrocephalus, intracranial space-occupying lesion, abnormal volume loss, vasogenic edema or mass effect. There is mild tonsillar ectopia. The gray matter structures are unremarkable. The supra and infratentorial white matter is unremarkable.  There is no subdural or epidural space-occupying process. The visualized flow voids of the cerebral vasculature are unremarkable.  There is no large lesion identified in the expected course of the intracranial portions of the cranial nerves. The visualized bones, muscles, adipose tissue and the glands are unremarkable. There is mucosal thickening in the paranasal sinuses.     1.  The FLAIR images demonstrates diffuse hyperintense signal within the subarachnoid spaces. There is also minimal abnormal ependymal T2 hyperintensity. There is no hydrocephalus. There is no abnormal contrast enhancement. This finding is concerning for  acute Meningitis/meningeal inflammation. Please correlate with the CSF study. 2.  Mild tonsillar ectopia.    DX-CHEST-PORTABLE (1 VIEW)    Result Date: 9/12/2024 9/12/2024 6:36 PM HISTORY/REASON FOR EXAM:  Shortness of Breath TECHNIQUE/EXAM DESCRIPTION AND NUMBER OF VIEWS: Single portable view of the chest. COMPARISON: None FINDINGS: Tracheal tube tip is below the level of the clavicles. NG tube is in the proximal stomach. Heart size is within normal limits. No focal infiltrates or consolidations are identified in the lungs. No pleural fluid collections are identified. No pneumothorax is appreciated.     No acute cardiac or pulmonary abnormality is identified.      Micro:  Results       Procedure Component Value Units Date/Time    GRAM STAIN [[id number]]  (Abnormal) Collected: 09/12/24 1946    Order Status: Completed Specimen: CSF Updated: 09/14/24 1358     Significant Indicator .     Source CSF     Site TAP     Gram Stain Result Many WBCs.  Moderate Gram  positive cocci.      Narrative:      [facility] tel. [phone] 09/13/2024, 07:13, RB PERF. RESULTS CALLED TO:[phone]  ER tel.  09/12/2024, 20:50, RB PERF. RESULTS CALLED TO: Hallie GILLIAM [phone]    E-Test [[id number]] Collected: 09/12/24 1946    Order Status: Completed Specimen: Other Updated: 09/14/24 1358     ETEST Sensitivity FINAL    Narrative:      [facility] tel. [phone] 09/13/2024, 07:13, RB PERF. RESULTS CALLED TO:[id number]  ER tel.  09/12/2024, 20:50, RB PERF. RESULTS CALLED TO: Hallie GILLIAM [phone]    CSF CULTURE [[id number]]  (Abnormal)  (Susceptibility) Collected: 09/12/24 1946    Order Status: Completed Specimen: CSF from Tap Updated: 09/14/24 1358     Significant Indicator POS     Source CSF     Site TAP     Culture Result -     Gram Stain Result Many WBCs.  Moderate Gram positive cocci.       Culture Result Streptococcus pneumoniae  Moderate growth      Narrative:      [facility] tel. [phone] 09/13/2024, 07:13, RB PERF. RESULTS CALLED TO:[phone]  ER tel.  09/12/2024, 20:50, RB PERF. RESULTS CALLED TO: Hallie GILLIAM [phone]    Susceptibility       Streptococcus pneumoniae (1)       Antibiotic Interpretation Microscan   Method Status    Penicillin-meningitis Sensitive 0.032 mcg/mL E-TEST Final    Penicillin non-meningitis Sensitive 0.032 mcg/mL E-TEST Final    Cefotaxime-meningitis Sensitive 0.012 mcg/mL E-TEST Final    Cefotaxime-non meningitis Sensitive 0.012 mcg/mL E-TEST Final    Erythromycin Sensitive 0.064 mcg/mL E-TEST Final    Levofloxacin Sensitive 1.0 mcg/mL E-TEST Final                       MRSA By PCR (Amp) [[id number]] Collected: 09/12/24 2205    Order Status: Completed Specimen: Respirate from Nares Updated: 09/13/24 1230     MRSA by PCR Negative    BLOOD CULTURE [[id number]] Collected: 09/12/24 2220    Order Status: Completed Specimen: Blood from Peripheral Updated: 09/13/24 0008     Significant Indicator NEG     Source BLD     Site PERIPHERAL     Culture Result No Growth  Note: Blood cultures are incubated for 5 days and  are  monitored continuously.Positive blood cultures  are called to the RN and reported as soon as  they are identified.      BLOOD CULTURE [[id number]] Collected: 09/12/24 2205    Order Status: Completed Specimen: Blood from Peripheral Updated: 09/13/24 0008     Significant Indicator NEG     Source BLD     Site PERIPHERAL     Culture Result No Growth  Note: Blood cultures are incubated for 5 days and  are monitored continuously.Positive blood cultures  are called to the RN and reported as soon as  they are identified.      URINALYSIS [[id number]]  (Abnormal) Collected: 09/12/24 1843    Order Status: Completed Specimen: Urine Updated: 09/12/24 1918     Color Orange     Character Clear     Specific Gravity >=1.045     Ph 5.0     Glucose 500 mg/dL      Ketones 80 mg/dL      Protein Negative mg/dL      Bilirubin Negative     Urobilinogen, Urine 0.2     Nitrite Negative     Leukocyte Esterase Negative     Occult Blood Moderate     Micro Urine Req Microscopic    URINALYSIS [[id number]]     Order Status: Canceled Specimen: Urine             Assessment:  Active Hospital Problems    Diagnosis     *Streptococcus pneumoniae meningitis [G00.1]     Bradycardia [R00.1]     Acute encephalopathy [G93.40]     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   Micro reviewed    Patient doing well with no complaints.    Assessment:  Kwesi Rivera is a 34 y.o. man with no prior medical history admitted from [facility], [place] secondary to acute encephalopathy and headache.  Lumbar puncture on 9/12 consistent with bacterial meningitis with both CSF cultures and meningitis/encephalitis PCR panel positive for Streptococcus  pneumoniae.  Blood cultures on admission are negative to date.      Streptococcus pneumoniae meningitis       Ventilator dependent respiratory failure, extubated on 9/14  Acute encephalopathy, secondary to above  Back pain, prior to admission  -MRI lumbar spine with fluid in the T2 caudal thecal sac, reported as consistent with meningitis likely represents intradural exudate, herniation and plan is for follow-up with neurosurgery as an outpatient   -MRI cervical thoracic spine with no obvious infectious finding  Bradycardia, stable      Plan:  - Continue penicillin, strep pneumo with high sensitivity to penicillin and this antibiotic has good CNS penetration  - penicillin G 4,000,000 units every 4 hours   --- Anticipate 14-day antibiotic course, can potentially use continuous infusion penicillin on discharge, end 9/26  -Monitor renal function and Vanco trough  -CSF cultures +Streptococcus pneumoniae, sensitive to cephalosporins, sensitive to levofloxacin, penicillin meningitis MATTHEW 0.032, highly sensitive  -Continue dexamethasone for 4 days  -Continue to monitor neurologic status        This infection poses a threat to life and further neurologic function     Disposition: TBD, plan is for discharge to SNF, okay to use continuous infusion penicillin if this is helpful for discharge.     Need for PICC line: Midline ordered     Plan of care discussed with Dr. Child.  ID will sign off.

## 2024-09-17 NOTE — CARE PLAN
The patient is Stable - Low risk of patient condition declining or worsening    Shift Goals  Clinical Goals: Stable neuro exams, abx, safety with mobility  Patient Goals: Rest, get better  Family Goals: Get better, come home    Progress made toward(s) clinical / shift goals:  Patient is alert and oriented. He is able to communicate his needs. Education provided on safety and POC. Educated patient on using his call light for assistance, patient needs reinforcement. Educated on POC and abx schedule. Patient demonstrates understanding. Bed low, locked and call light in reach.    Problem: Knowledge Deficit - Standard  Goal: Patient and family/care givers will demonstrate understanding of plan of care, disease process/condition, diagnostic tests and medications  Outcome: Progressing   Patient demonstrates understanding of safety education and POC, all questions answered  Problem: Hemodynamics  Goal: Patient's hemodynamics, fluid balance and neurologic status will be stable or improve  Outcome: Progressing   Patient's neuro checks remains stable, vitals are stable and labs are improving  Problem: Urinary - Renal Perfusion  Goal: Ability to achieve and maintain adequate renal perfusion and functioning will improve  Outcome: Progressing   Patient is having regular urinary output  Problem: Fall Risk  Goal: Patient will remain free from falls  Outcome: Progressing   Patient is able to mobilize with minimal assistance, he can be unsteady at times, patient's strength is improving    Patient is not progressing towards the following goals: N/A

## 2024-09-17 NOTE — DISCHARGE PLANNING
Insurance remains pending.  Msg placed to Bhumi, spouse to discuss [facility] & D/C resources/support.

## 2024-09-17 NOTE — CARE PLAN
The patient is Stable - Low risk of patient condition declining or worsening    Shift Goals  Clinical Goals: Neuro status, rest  Patient Goals: Rest  Family Goals: Get better, come home    Progress made toward(s) clinical / shift goals:  progressing    Patient is not progressing towards the following goals:

## 2024-09-17 NOTE — DIETARY
Nutrition Update:    Day 5 of admit.  Kwesi Rivera is a 34 y.o. male with admitting DX of Encephalitis [G04.90].  Patient being followed to optimize nutrition.    Pt transitioned from TF to PO diet on 9/15. Current Diet: Regular. PO intake the past 3 meals have been 50-75% x 1, % x2, trending up from <50% of meals when PO diet first started on 9/15.    Problem: Nutritional:  Goal: Achieve adequate nutritional intake  Description: Patient will consume >% of meals  Outcome: Progressing    RD following.

## 2024-09-17 NOTE — PROGRESS NOTES
Monitor summary: SB, HR 35-54, MS .14, QRS .08, QT .40 with rare pvc per strip from monitor room

## 2024-09-17 NOTE — PROGRESS NOTES
Monitor Summary: SB 37-52, NM 0.16, QRS 0.06, QT 0.41, with rare PACs/PVCs per strip from monitor room.

## 2024-09-17 NOTE — PROCEDURES
Vascular Access Team    Date of Insertion: 9/17/24  Arm Circumference: 34  Internal length: 15  External Length: 0  Vein Occupancy %: 22  Reason for Midline: abx  Labs: WBC 6.7 , , BUN 23, Cr 0.78,  , INR na    Orders confirmed, vessel patency confirmed with ultrasound. Risks and benefits of procedure explained to patient and education regarding line associated bloodstream infections provided. Questions answered.     Power Midline placed in RUE per licensed provider order with ultrasound guidance. 4  Fr, single lumen Power Midline placed in brachial vein after 1 attempt(s). 2 mL of 1% lidocaine injected intradermally, 21 gauge microintroducer needle was visualized entering the vein and modified Seldinger technique used. 15 cm catheter inserted with good blood return. Secured at 0 cm marker. Internal positioning stylet removed and verified to be intact. Each lumen flushed without resistance with 10 mL 0.9% normal saline. Midline secured with Biopatch and Tegaderm.     Midline placement is confirmed by nurse using ultrasound and ability to flush and draw blood. Midline is appropriate for use at this time.  Patient tolerated procedure well, without complications.  No X-ray is needed for placement confirmation.  Patient condition relayed to unit RN or ordering physician via this post procedure note in the EMR.     Ultrasound images uploaded to PACS and viewable in the EMR - yes  Ultrasound imaged printed and placed in paper chart - no     BARD Power Midline ref # [id number], Lot # [id number], Expiration Date 10/31/25

## 2024-09-17 NOTE — DISCHARGE PLANNING
Case Management Discharge Planning    Admission Date: 9/12/2024  GMLOS: 5.1  ALOS: 5    6-Clicks ADL Score: 23  6-Clicks Mobility Score: 17  PT and/or OT Eval ordered: Yes  Post-acute Referrals Ordered: Yes  Post-acute Choice Obtained: Yes  Has referral(s) been sent to post-acute provider:  Yes      Anticipated Discharge Dispo: Discharge Disposition: Disch to  rehab facility or distinct part unit (62)    DME Needed: No    Action(s) Taken: DC Assessment Complete (See below)    Escalations Completed: Provider    Medically Clear: Yes    Next Steps: Follow up Approval of insurance Auth    Barriers to Discharge: Pending Insurance Authorization    Is the patient up for discharge tomorrow: No      Care Transition Team Assessment    RN CM met with pt at bedside to complete assessment. Pt A&Ox4 and able to verify the information on the face sheet. Pt lives with his SO and 2 young daughters in a story house in [facility]. Pt is an active . On Baseline, Pt was independent  in all his ADL's and IADL's. Pt has not had previous HH nor does he use DME or home O2. Pt has no PCP listed at this time. Pt is agreeable for post acute placement. Pt's SO is a good support for Pt.     [facility]     Information Source  Orientation Level: Oriented X4  Information Given By: Patient  Who is responsible for making decisions for patient? : Patient    Readmission Evaluation  Is this a readmission?: No    Elopement Risk  Legal Hold: No  Ambulatory or Self Mobile in Wheelchair: Yes  Disoriented: No  Psychiatric Symptoms: None  History of Wandering: No  Elopement this Admit: No  Vocalizing Wanting to Leave: No  Displays Behaviors, Body Language Wanting to Leave: No-Not at Risk for Elopement  Elopement Risk: Not at Risk for Elopement    Interdisciplinary Discharge Planning  Patient or legal guardian wants to designate a caregiver: No  Housing / Facility: Unable To Determine At This Time  Prior Services:  None    Discharge Preparedness  What is your plan after discharge?: Other (comment) (IPR)  What are your discharge supports?: Partner  Prior Functional Level: Ambulatory, Independent with Activities of Daily Living, Drives Self  Difficulity with ADLs: None  Difficulity with IADLs: None    Functional Assesment  Prior Functional Level: Ambulatory, Independent with Activities of Daily Living, Drives Self    Finances  Financial Barriers to Discharge: No  Prescription Coverage: Yes              Advance Directive  Advance Directive?: None    Domestic Abuse  Possible Abuse/Neglect Reported to:: Not Applicable    Psychological Assessment  History of Substance Abuse: None  History of Psychiatric Problems: No    Discharge Risks or Barriers  Discharge risks or barriers?: Complex medical needs  Patient risk factors: Complex medical needs    Anticipated Discharge Information  Discharge Disposition: Disch to IP rehab facility or distinct part unit (62)

## 2024-09-17 NOTE — PROGRESS NOTES
Monitor summary: SB-SR, HR 39-90, OH .15, QRS .07, QT .50 with rare pac per strip from monitor room

## 2024-09-18 PROCEDURE — 700111 HCHG RX REV CODE 636 W/ 250 OVERRIDE (IP): Mod: JZ | Performed by: STUDENT IN AN ORGANIZED HEALTH CARE EDUCATION/TRAINING PROGRAM

## 2024-09-18 PROCEDURE — 700105 HCHG RX REV CODE 258: Performed by: INTERNAL MEDICINE

## 2024-09-18 PROCEDURE — 700102 HCHG RX REV CODE 250 W/ 637 OVERRIDE(OP): Performed by: NURSE PRACTITIONER

## 2024-09-18 PROCEDURE — 700102 HCHG RX REV CODE 250 W/ 637 OVERRIDE(OP)

## 2024-09-18 PROCEDURE — A9270 NON-COVERED ITEM OR SERVICE: HCPCS | Performed by: INTERNAL MEDICINE

## 2024-09-18 PROCEDURE — 99232 SBSQ HOSP IP/OBS MODERATE 35: CPT | Performed by: INTERNAL MEDICINE

## 2024-09-18 PROCEDURE — A9270 NON-COVERED ITEM OR SERVICE: HCPCS

## 2024-09-18 PROCEDURE — A9270 NON-COVERED ITEM OR SERVICE: HCPCS | Performed by: NURSE PRACTITIONER

## 2024-09-18 PROCEDURE — 700111 HCHG RX REV CODE 636 W/ 250 OVERRIDE (IP): Performed by: INTERNAL MEDICINE

## 2024-09-18 PROCEDURE — 770020 HCHG ROOM/CARE - TELE (206)

## 2024-09-18 PROCEDURE — 700102 HCHG RX REV CODE 250 W/ 637 OVERRIDE(OP): Performed by: INTERNAL MEDICINE

## 2024-09-18 RX ORDER — IBUPROFEN 800 MG/1
400 TABLET, FILM COATED ORAL EVERY 6 HOURS PRN
Status: DISCONTINUED | OUTPATIENT
Start: 2024-09-18 | End: 2024-09-19 | Stop reason: HOSPADM

## 2024-09-18 RX ADMIN — IBUPROFEN 400 MG: 800 TABLET, FILM COATED ORAL at 09:52

## 2024-09-18 RX ADMIN — Medication 10 MG: at 20:02

## 2024-09-18 RX ADMIN — SODIUM CHLORIDE 4 MILLION UNITS: 9 INJECTION, SOLUTION INTRAVENOUS at 14:34

## 2024-09-18 RX ADMIN — SODIUM CHLORIDE 4 MILLION UNITS: 9 INJECTION, SOLUTION INTRAVENOUS at 22:08

## 2024-09-18 RX ADMIN — SODIUM CHLORIDE 4 MILLION UNITS: 9 INJECTION, SOLUTION INTRAVENOUS at 17:21

## 2024-09-18 RX ADMIN — ENOXAPARIN SODIUM 40 MG: 100 INJECTION SUBCUTANEOUS at 18:00

## 2024-09-18 RX ADMIN — IBUPROFEN 400 MG: 800 TABLET, FILM COATED ORAL at 16:23

## 2024-09-18 RX ADMIN — ACETAMINOPHEN 650 MG: 325 TABLET ORAL at 02:05

## 2024-09-18 RX ADMIN — SODIUM CHLORIDE 4 MILLION UNITS: 9 INJECTION, SOLUTION INTRAVENOUS at 09:53

## 2024-09-18 RX ADMIN — SODIUM CHLORIDE 4 MILLION UNITS: 9 INJECTION, SOLUTION INTRAVENOUS at 05:38

## 2024-09-18 RX ADMIN — SODIUM CHLORIDE 4 MILLION UNITS: 9 INJECTION, SOLUTION INTRAVENOUS at 02:06

## 2024-09-18 ASSESSMENT — PAIN DESCRIPTION - PAIN TYPE
TYPE: ACUTE PAIN

## 2024-09-18 ASSESSMENT — ENCOUNTER SYMPTOMS
BACK PAIN: 0
COUGH: 0
HEADACHES: 1
VOMITING: 0
BLURRED VISION: 0
NAUSEA: 0
FEVER: 0
PALPITATIONS: 0
CHILLS: 0
DOUBLE VISION: 0
FALLS: 0

## 2024-09-18 ASSESSMENT — FIBROSIS 4 INDEX: FIB4 SCORE: 0.87

## 2024-09-18 NOTE — CARE PLAN
The patient is Stable - Low risk of patient condition declining or worsening    Shift Goals  Clinical Goals: monitor neuro status, rest  Patient Goals: rest  Family Goals: Get better, come home    Progress made toward(s) clinical / shift goals:  Antibiotics given as scheduled. Patient verbalized understanding of plan of care.   Problem: Respiratory  Goal: Patient will achieve/maintain optimum respiratory ventilation and gas exchange  Outcome: Progressing   Patient on room air. Patient denies SOB.   Problem: Pain - Standard  Goal: Alleviation of pain or a reduction in pain to the patient’s comfort goal  Outcome: Progressing   PRN medications available.   Problem: Skin Integrity  Goal: Skin integrity is maintained or improved  Outcome: Progressing   Patient able to turn self in bed. Patient able to ambulate with assistance.   Problem: Fall Risk  Goal: Patient will remain free from falls  Outcome: Progressing   Bed locked and in lowest position. Bed alarm in place. Patient calls appropriately for assistance.     Patient is not progressing towards the following goals:

## 2024-09-18 NOTE — THERAPY
Physical Therapy   Daily Treatment     Patient Name: Kwesi Rivera  Age:  34 y.o., Sex:  male  Medical Record #: [id number]  Today's Date: 9/18/2024     Precautions  Precautions: Fall Risk    Assessment    The pt found flat in bed w/room dark. The pt is supervision w/bed mobility, SBA w/STS and transfers due to impulsivity. Once standing initiated amb w/FWW, distances remains limited by c.o dizziness and fatigue.   The pt would benefit from a short stay @ [facility] for balance training and improving activity tolerance progressing him back to his PLOF.  PT will cont to follow.     Plan    Treatment Plan Status: Continue Current Treatment Plan  Type of Treatment: Bed Mobility, Equipment, Family / Caregiver Training, Gait Training, Neuro Re-Education / Balance, Stair Training, Therapeutic Activities, Therapeutic Exercise  Treatment Frequency: 5 Times per Week  Treatment Duration: Until Therapy Goals Met    DC Equipment Recommendations: Unable to determine at this time  Discharge Recommendations: Recommend post-acute placement for additional physical therapy services prior to discharge home (IPR)    Objective       09/17/24 1500   Time In/Time Out   Therapy Start Time 1445   Therapy End Time 1513   Total Therapy Time 28   Charge Group   PT Gait Training (Units) 1   PT Therapeutic Activities (Units) 1   Total Time Spent   PT Gait Training Time Spent (Mins) 10   PT Therapeutic Activities Time Spent (Mins) 18   PT Total Time Spent (Calculated) 28   Precautions   Precautions Fall Risk   Pain 0 - 10 Group   Pain Rating Scale (NPRS) 0   Other Treatments   Other Treatments Provided Pt instructed on getting OOB for meals and to amb w/nrsg @ least 1 more time today   Balance   Sitting Balance (Static) Good   Sitting Balance (Dynamic) Fair +   Standing Balance (Static) Fair   Standing Balance (Dynamic) Fair -   Weight Shift Sitting Good   Weight Shift Standing Fair   Skilled Intervention Verbal Cuing   Comments standing w/FWW   Bed  Mobility    Supine to Sit Supervised   Sit to Supine Supervised   Scooting Supervised   Rolling Supervised   Gait Analysis   Gait Level Of Assist Minimal Assist   Assistive Device Front Wheel Walker   Distance (Feet) 70   # of Times Distance was Traveled 1   Deviation Shuffled Gait   Skilled Intervention Verbal Cuing   Comments The pt tolerated increase in distance, c/o dizziness and fatigue. The pt amb tolerance remains significantly below his baseline.   Functional Mobility   Sit to Stand Standby Assist   Bed, Chair, Wheelchair Transfer Standby Assist   Skilled Intervention Verbal Cuing   Comments w/FWW, impulsive   6 Clicks Assessment - How much HELP from from another person do you currently need... (If the patient hasn't done an activity recently, how much help from another person do you think he/she would need if he/she tried?)   Turning from your back to your side while in a flat bed without using bedrails? 3   Moving from lying on your back to sitting on the side of a flat bed without using bedrails? 3   Moving to and from a bed to a chair (including a wheelchair)? 3   Standing up from a chair using your arms (e.g., wheelchair, or bedside chair)? 3   Walking in hospital room? 3   Climbing 3-5 steps with a railing? 2   6 clicks Mobility Score 17   Short Term Goals    Short Term Goal # 1 Patient will perform supine-sit, sit-supine with HOB flat without rails with supervision in 6 visits to safely get in & out of bed   Goal Outcome # 1 goal not met   Short Term Goal # 2 Patient will perform sit-stand with LRAD with supervision in 6 visits to progress with functional mobility   Goal Outcome # 2 Goal not met   Short Term Goal # 3 Patient will perform chair transfers with LRAD with supervision in 6 visits to safely get OOB to chair   Goal Outcome # 3 Goal not met   Short Term Goal # 4 Patient will ambulate 100 feet with LRAD with supervision in 6 visits to safely ambulate household distance   Goal Outcome # 4 Goal  not met   Physical Therapy Treatment Plan   Physical Therapy Treatment Plan Continue Current Treatment Plan   Anticipated Discharge Equipment and Recommendations   DC Equipment Recommendations Unable to determine at this time   Discharge Recommendations Recommend post-acute placement for additional physical therapy services prior to discharge home  (IPR)   Interdisciplinary Plan of Care Collaboration   IDT Collaboration with  Nursing   Patient Position at End of Therapy Seated;Chair Alarm On;Call Light within Reach;Tray Table within Reach;Phone within Reach   Collaboration Comments Nrsg notified nrsg of pts tx efforts.   Session Information   Date / Session Number  9/17--3 (3/5, 9/19) PTA/2   Supervising Physical Therapist (PTA Treatments Only)   Supervising Physical Therapist Emelyn No

## 2024-09-18 NOTE — PROGRESS NOTES
Monitor Summary: SB/SR/ST , MS 0.14, QRS 0.09, QT 0.37, with rare PACs per strip from monitor room.

## 2024-09-18 NOTE — PROGRESS NOTES
Hospital Medicine Daily Progress Note    Date of Service  9/18/2024    Chief Complaint  Kwesi Rivera is a 34 y.o. male admitted 9/12/2024 with altered mentation    Hospital Course  34 y.o. male who presented 9/12/2024 with no significant past medical history, presented to outside facility with altered mentation. The patient presented to the sending facility with headaches, he was nonverbal at the time, GCS of 9, there were no focal deficits, the patient was unable to follow commands, a CT a CTA of the head showed some diffuse edema bilateral in the hemispheres. He was transferred for further neurological evaluation. Intubated on arrival and underwent a lumbar puncture with milky appearing CSF, PCR showed strep pneumo with greater than 15,000 white cell count, greater than 600 protein on CSF, the patient was admitted to ICU, started on vancomycin, ceftriaxone, steroids. MRI brain performed without evidence of venous thrombosis. Infectious disease consulted, the patient was transition to high-dose penicillin.     Interval Problem Update  Patient was seen and examined at bedside.  No acute events overnight. Patient is resting comfortably in bed and in no acute distress.     Completed steroid therapy  Antibiotics through 09/26  Midline placed  ID recs  Plan for discharge to [facility]    I have discussed this patient's plan of care and discharge plan at IDT rounds today with Case Management, Nursing, Nursing leadership, and other members of the IDT team.    Consultants/Specialty  infectious disease    Code Status  Full Code    Disposition  The patient is medically cleared for discharge to home or a post-acute facility.  Anticipate discharge to: an inpatient rehabilitation hospital    I have placed the appropriate orders for post-discharge needs.    Review of Systems  Review of Systems   Constitutional:  Negative for chills and fever.   HENT:  Negative for congestion.    Eyes:  Negative for blurred vision and double vision.    Respiratory:  Negative for cough.    Cardiovascular:  Negative for chest pain and palpitations.   Gastrointestinal:  Negative for nausea and vomiting.   Genitourinary:  Negative for dysuria and frequency.   Musculoskeletal:  Negative for back pain and falls.   Neurological:  Positive for headaches.        Physical Exam  Temp:  [36.4 °C (97.5 °F)-37 °C (98.6 °F)] 37 °C (98.6 °F)  Pulse:  [44-72] 59  Resp:  [17-18] 18  BP: ()/(52-75) 116/57  SpO2:  [91 %-96 %] 92 %    Physical Exam  Vitals and nursing note reviewed.   Constitutional:       General: He is not in acute distress.  HENT:      Right Ear: External ear normal.      Left Ear: External ear normal.      Nose: No congestion.      Mouth/Throat:      Pharynx: No oropharyngeal exudate.   Eyes:      General: No scleral icterus.  Cardiovascular:      Rate and Rhythm: Bradycardia present.      Heart sounds: No murmur heard.  Pulmonary:      Breath sounds: No wheezing.   Abdominal:      Tenderness: There is no abdominal tenderness. There is no guarding or rebound.   Musculoskeletal:         General: No swelling or deformity.   Skin:     Coloration: Skin is not jaundiced.      Findings: No bruising.   Neurological:      General: No focal deficit present.      Mental Status: He is alert.      Motor: No weakness.   Psychiatric:         Mood and Affect: Mood normal.         Behavior: Behavior normal.     Patient was seen and examined at bedside. No changes in physical exam from prior evaluation.      Fluids    Intake/Output Summary (Last 24 hours) at 9/18/2024 1644  Last data filed at 9/18/2024 0236  Gross per 24 hour   Intake 600 ml   Output --   Net 600 ml        Laboratory  Recent Labs     09/16/24  0444 09/17/24  0109   WBC 8.4 6.7   RBC 4.10* 4.25*   HEMOGLOBIN 12.4* 12.6*   HEMATOCRIT 35.8* 36.9*   MCV 87.3 86.8   MCH 30.2 29.6   MCHC 34.6 34.1   RDW 40.2 38.9   PLATELETCT 184 199   MPV 10.1 10.1     Recent Labs     09/16/24 0444 09/17/24  0109   SODIUM 140  142   POTASSIUM 4.2 4.1   CHLORIDE 105 106   CO2 24 24   GLUCOSE 166* 159*   BUN 21 23*   CREATININE 0.93 0.78   CALCIUM 8.1* 8.6                     Imaging  IR-MIDLINE CATHETER INSERTION WO GUIDANCE > AGE 3   Final Result                  Ultrasound-guided midline placement performed by qualified nursing staff    as above.          MR-LUMBAR SPINE-WITH & W/O   Final Result      1.  T2 hypointense fluid-fluid level in the dependent caudal thecal sac in keeping with the history of meningitis. This likely represents intradural exudate in the setting of meningitis. Dependently layering subarachnoid hemorrhage within the thecal sac    could have a similar appearance.   2.  L4-5 tiny midline disc bulge with underlying tiny annular fissure. No central or foraminal stenosis.   3.  L5-S1 large left paramedian disc protrusion/extrusion with marked compromise of the emerging left S1 nerve root. Mild right foraminal stenosis. Moderate left foraminal stenosis.      MR-THORACIC SPINE-WITH & W/O   Final Result      MRI OF THE THORACIC SPINE WITHOUT AND WITH CONTRAST WITHIN NORMAL LIMITS.      MR-CERVICAL SPINE-WITH & W/O   Final Result      1.  C5-6 minimal midline disc/osteophyte complex. No central or foraminal stenosis.   2.  C6-C7 moderate-marked right foraminal stenosis due to spondylotic change.   3.  No myelopathic cord signal. Concerning the history of meningitis, no abnormal intradural extra medullary enhancement.      MR-BRAIN-WITH & W/O   Final Result      1.  The FLAIR images demonstrates diffuse hyperintense signal within the subarachnoid spaces. There is also minimal abnormal ependymal T2 hyperintensity. There is no hydrocephalus. There is no abnormal contrast enhancement. This finding is concerning for    acute Meningitis/meningeal inflammation. Please correlate with the CSF study.   2.  Mild tonsillar ectopia.      MR-VENOGRAM (MRV) HEAD   Final Result      Unremarkable MR venogram of the cerebral veins.     "  DX-CHEST-PORTABLE (1 VIEW)   Final Result         No acute cardiac or pulmonary abnormality is identified.           Assessment/Plan  * Streptococcus pneumoniae meningitis- (present on admission)  Assessment & Plan  -presented with AMS and headaches  -LP done in ED \"milky\" showed greater than 15,000 WBC and greater than 600 protein  -PCR positive Streptococcus pneumoniae  -ID consulted, transition to penicillin IV every 4 hours, end date 9/26    Completed steroid therapy  Antibiotics through 09/26  Midline placed  ID recs  Plan for discharge to [facility]    Lumbar disc herniation  Assessment & Plan  -Patient complained of back pain prior to presentation  -MRI showed disc herniation L5-S1  -Multimodal pain management  -Outpatient neurosurgery follow-up    Acute encephalopathy  Assessment & Plan  -Due to bacterial meningitis  -Keep patient awake during the day and avoid daytime naps. Remove all unnecessary lines (central lines, peripheral IVs, feeding tubes, ho catheters).  -Avoid polypharmacy, frequent re-orientation, maximize family time at bedside, use glasses and hearing aids if needed, treat pain, encourage ambulation, minimize benzos/anticholinergic agents.   -Aspiration precautions  -Seizure precautions  -Fall precautions    Bradycardia  Assessment & Plan  -asymptomatic  -hemodynamically stable  -telemetry    Sepsis due to Streptococcus pneumoniae with encephalopathy without septic shock (HCC)  Assessment & Plan  This is Sepsis Present on admission    On mechanically assisted ventilation (HCC)  Assessment & Plan  -extubated 9/14           VTE prophylaxis:    enoxaparin ppx      I have performed a physical exam and reviewed and updated ROS and Plan today (9/18/2024). In review of yesterday's note (9/17/2024), there are no changes except as documented above.    Continue IV penicillin  NSAID for headache    Greater than 44 minutes spent prepping to see patient (e.g. review of tests) obtaining and/or reviewing " separately obtained history. Performing a medically appropriate examination and/ evaluation.  Counseling and educating the patient/family/caregiver.  Ordering medications, tests, or procedures.  Referring and communicating with other health care professionals.  Documenting clinical information in EPIC.  Independently interpreting results and communicating results to patient/family/caregiver.  Care coordination.

## 2024-09-18 NOTE — DISCHARGE PLANNING
Insurance remains pending.  Msg placed to Dr. Child-seeking medical clearance.     6000-Spoke with Santa, spouse regarding [facility] & D/C resources/support.  She is agreeable with an admission.  They live in a 2LV home with 1ST to enter with their 2 minor children.  Kwesi will need to negotiate a FOS to access the BD/BR.  Mackenzie does not work and will provide 24/7 assistance as well as attend family training.

## 2024-09-18 NOTE — CARE PLAN
The patient is Stable - Low risk of patient condition declining or worsening    Shift Goals  Clinical Goals: Monitor neuro, rest  Patient Goals: rest  Family Goals: Get better, come home    Progress made toward(s) clinical / shift goals:  progressing    Patient is not progressing towards the following goals:

## 2024-09-19 ENCOUNTER — HOSPITAL ENCOUNTER (INPATIENT)
Facility: REHABILITATION | Age: 34
LOS: 6 days | DRG: 095 | End: 2024-09-25
Attending: PHYSICAL MEDICINE & REHABILITATION | Admitting: PHYSICAL MEDICINE & REHABILITATION
Payer: OTHER GOVERNMENT

## 2024-09-19 VITALS
OXYGEN SATURATION: 93 % | SYSTOLIC BLOOD PRESSURE: 113 MMHG | RESPIRATION RATE: 18 BRPM | DIASTOLIC BLOOD PRESSURE: 61 MMHG | HEART RATE: 51 BPM | WEIGHT: 191.8 LBS | TEMPERATURE: 97.9 F | HEIGHT: 69 IN | BODY MASS INDEX: 28.41 KG/M2

## 2024-09-19 DIAGNOSIS — G00.2 STREPTOCOCCAL MENINGITIS: ICD-10-CM

## 2024-09-19 PROCEDURE — 700111 HCHG RX REV CODE 636 W/ 250 OVERRIDE (IP): Performed by: INTERNAL MEDICINE

## 2024-09-19 PROCEDURE — 770010 HCHG ROOM/CARE - REHAB SEMI PRIVAT*

## 2024-09-19 PROCEDURE — 94760 N-INVAS EAR/PLS OXIMETRY 1: CPT

## 2024-09-19 PROCEDURE — 99239 HOSP IP/OBS DSCHRG MGMT >30: CPT | Performed by: INTERNAL MEDICINE

## 2024-09-19 PROCEDURE — A9270 NON-COVERED ITEM OR SERVICE: HCPCS | Performed by: PHYSICAL MEDICINE & REHABILITATION

## 2024-09-19 PROCEDURE — 700111 HCHG RX REV CODE 636 W/ 250 OVERRIDE (IP): Mod: JZ | Performed by: PHYSICAL MEDICINE & REHABILITATION

## 2024-09-19 PROCEDURE — 97116 GAIT TRAINING THERAPY: CPT | Mod: CQ

## 2024-09-19 PROCEDURE — A9270 NON-COVERED ITEM OR SERVICE: HCPCS | Performed by: INTERNAL MEDICINE

## 2024-09-19 PROCEDURE — 700105 HCHG RX REV CODE 258: Performed by: INTERNAL MEDICINE

## 2024-09-19 PROCEDURE — 700105 HCHG RX REV CODE 258: Performed by: PHYSICAL MEDICINE & REHABILITATION

## 2024-09-19 PROCEDURE — 700102 HCHG RX REV CODE 250 W/ 637 OVERRIDE(OP): Performed by: PHYSICAL MEDICINE & REHABILITATION

## 2024-09-19 PROCEDURE — 99223 1ST HOSP IP/OBS HIGH 75: CPT | Performed by: PHYSICAL MEDICINE & REHABILITATION

## 2024-09-19 PROCEDURE — 700102 HCHG RX REV CODE 250 W/ 637 OVERRIDE(OP): Performed by: INTERNAL MEDICINE

## 2024-09-19 RX ORDER — IBUPROFEN 800 MG/1
400 TABLET, FILM COATED ORAL EVERY 6 HOURS PRN
Status: CANCELLED | OUTPATIENT
Start: 2024-09-19

## 2024-09-19 RX ORDER — ONDANSETRON 4 MG/1
4 TABLET, ORALLY DISINTEGRATING ORAL 4 TIMES DAILY PRN
Status: DISCONTINUED | OUTPATIENT
Start: 2024-09-19 | End: 2024-09-25 | Stop reason: HOSPADM

## 2024-09-19 RX ORDER — ECHINACEA PURPUREA EXTRACT 125 MG
2 TABLET ORAL PRN
Status: DISCONTINUED | OUTPATIENT
Start: 2024-09-19 | End: 2024-09-25 | Stop reason: HOSPADM

## 2024-09-19 RX ORDER — HYDRALAZINE HYDROCHLORIDE 25 MG/1
25 TABLET, FILM COATED ORAL EVERY 8 HOURS PRN
Status: DISCONTINUED | OUTPATIENT
Start: 2024-09-19 | End: 2024-09-25 | Stop reason: HOSPADM

## 2024-09-19 RX ORDER — ENOXAPARIN SODIUM 100 MG/ML
40 INJECTION SUBCUTANEOUS DAILY
Status: CANCELLED | OUTPATIENT
Start: 2024-09-19

## 2024-09-19 RX ORDER — IBUPROFEN 400 MG/1
400 TABLET, FILM COATED ORAL EVERY 6 HOURS PRN
Status: DISCONTINUED | OUTPATIENT
Start: 2024-09-19 | End: 2024-09-25 | Stop reason: HOSPADM

## 2024-09-19 RX ORDER — CARBOXYMETHYLCELLULOSE SODIUM 5 MG/ML
1 SOLUTION/ DROPS OPHTHALMIC PRN
Status: DISCONTINUED | OUTPATIENT
Start: 2024-09-19 | End: 2024-09-25 | Stop reason: HOSPADM

## 2024-09-19 RX ORDER — TRAMADOL HYDROCHLORIDE 50 MG/1
50 TABLET ORAL EVERY 4 HOURS PRN
Status: DISCONTINUED | OUTPATIENT
Start: 2024-09-19 | End: 2024-09-25 | Stop reason: HOSPADM

## 2024-09-19 RX ORDER — POLYETHYLENE GLYCOL 3350 17 G/17G
1 POWDER, FOR SOLUTION ORAL
Status: DISCONTINUED | OUTPATIENT
Start: 2024-09-19 | End: 2024-09-25 | Stop reason: HOSPADM

## 2024-09-19 RX ORDER — ACETAMINOPHEN 325 MG/1
650 TABLET ORAL EVERY 4 HOURS PRN
Status: DISCONTINUED | OUTPATIENT
Start: 2024-09-19 | End: 2024-09-25 | Stop reason: HOSPADM

## 2024-09-19 RX ORDER — ONDANSETRON 2 MG/ML
4 INJECTION INTRAMUSCULAR; INTRAVENOUS 4 TIMES DAILY PRN
Status: DISCONTINUED | OUTPATIENT
Start: 2024-09-19 | End: 2024-09-25 | Stop reason: HOSPADM

## 2024-09-19 RX ORDER — ALUMINA, MAGNESIA, AND SIMETHICONE 2400; 2400; 240 MG/30ML; MG/30ML; MG/30ML
20 SUSPENSION ORAL
Status: DISCONTINUED | OUTPATIENT
Start: 2024-09-19 | End: 2024-09-25 | Stop reason: HOSPADM

## 2024-09-19 RX ORDER — TRAZODONE HYDROCHLORIDE 50 MG/1
50 TABLET, FILM COATED ORAL
Status: DISCONTINUED | OUTPATIENT
Start: 2024-09-19 | End: 2024-09-25 | Stop reason: HOSPADM

## 2024-09-19 RX ORDER — AMOXICILLIN 250 MG
2 CAPSULE ORAL 2 TIMES DAILY
Status: DISCONTINUED | OUTPATIENT
Start: 2024-09-19 | End: 2024-09-25 | Stop reason: HOSPADM

## 2024-09-19 RX ORDER — MIDAZOLAM HYDROCHLORIDE 5 MG/ML
5 INJECTION INTRAMUSCULAR; INTRAVENOUS PRN
Status: DISCONTINUED | OUTPATIENT
Start: 2024-09-19 | End: 2024-09-25 | Stop reason: HOSPADM

## 2024-09-19 RX ORDER — ENOXAPARIN SODIUM 100 MG/ML
40 INJECTION SUBCUTANEOUS DAILY
Status: DISCONTINUED | OUTPATIENT
Start: 2024-09-19 | End: 2024-09-23

## 2024-09-19 RX ORDER — LANOLIN ALCOHOL/MO/W.PET/CERES
10 CREAM (GRAM) TOPICAL NIGHTLY PRN
Status: DISCONTINUED | OUTPATIENT
Start: 2024-09-19 | End: 2024-09-25 | Stop reason: HOSPADM

## 2024-09-19 RX ADMIN — IBUPROFEN 400 MG: 800 TABLET, FILM COATED ORAL at 07:38

## 2024-09-19 RX ADMIN — IBUPROFEN 400 MG: 400 TABLET, FILM COATED ORAL at 15:17

## 2024-09-19 RX ADMIN — SODIUM CHLORIDE 4 MILLION UNITS: 9 INJECTION, SOLUTION INTRAVENOUS at 02:11

## 2024-09-19 RX ADMIN — SODIUM CHLORIDE 4 MILLION UNITS: 9 INJECTION, SOLUTION INTRAVENOUS at 06:09

## 2024-09-19 RX ADMIN — SODIUM CHLORIDE 4 MILLION UNITS: 9 INJECTION, SOLUTION INTRAVENOUS at 13:26

## 2024-09-19 RX ADMIN — SODIUM CHLORIDE 4 MILLION UNITS: 9 INJECTION, SOLUTION INTRAVENOUS at 17:53

## 2024-09-19 RX ADMIN — SODIUM CHLORIDE 4 MILLION UNITS: 9 INJECTION, SOLUTION INTRAVENOUS at 09:59

## 2024-09-19 RX ADMIN — ENOXAPARIN SODIUM 40 MG: 100 INJECTION SUBCUTANEOUS at 17:50

## 2024-09-19 RX ADMIN — Medication 10.5 MG: at 20:59

## 2024-09-19 RX ADMIN — SODIUM CHLORIDE 4 MILLION UNITS: 9 INJECTION, SOLUTION INTRAVENOUS at 22:01

## 2024-09-19 ASSESSMENT — COGNITIVE AND FUNCTIONAL STATUS - GENERAL
WALKING IN HOSPITAL ROOM: A LITTLE
SUGGESTED CMS G CODE MODIFIER MOBILITY: CJ
MOVING TO AND FROM BED TO CHAIR: A LITTLE
CLIMB 3 TO 5 STEPS WITH RAILING: A LITTLE
MOBILITY SCORE: 20
STANDING UP FROM CHAIR USING ARMS: A LITTLE

## 2024-09-19 ASSESSMENT — PATIENT HEALTH QUESTIONNAIRE - PHQ9
1. LITTLE INTEREST OR PLEASURE IN DOING THINGS: NOT AT ALL
2. FEELING DOWN, DEPRESSED, IRRITABLE, OR HOPELESS: NOT AT ALL
2. FEELING DOWN, DEPRESSED, IRRITABLE, OR HOPELESS: NOT AT ALL
SUM OF ALL RESPONSES TO PHQ9 QUESTIONS 1 AND 2: 0
SUM OF ALL RESPONSES TO PHQ9 QUESTIONS 1 AND 2: 0
1. LITTLE INTEREST OR PLEASURE IN DOING THINGS: NOT AT ALL

## 2024-09-19 ASSESSMENT — LIFESTYLE VARIABLES
HAVE YOU EVER FELT YOU SHOULD CUT DOWN ON YOUR DRINKING: NO
ALCOHOL_USE: YES
TOTAL SCORE: 0
ON A TYPICAL DAY WHEN YOU DRINK ALCOHOL HOW MANY DRINKS DO YOU HAVE: 2
DOES PATIENT WANT TO STOP DRINKING: NO
EVER HAD A DRINK FIRST THING IN THE MORNING TO STEADY YOUR NERVES TO GET RID OF A HANGOVER: NO
AVERAGE NUMBER OF DAYS PER WEEK YOU HAVE A DRINK CONTAINING ALCOHOL: 2
TOTAL SCORE: 0
HAVE PEOPLE ANNOYED YOU BY CRITICIZING YOUR DRINKING: NO
TOTAL SCORE: 0
CONSUMPTION TOTAL: POSITIVE
HOW MANY TIMES IN THE PAST YEAR HAVE YOU HAD 5 OR MORE DRINKS IN A DAY: 5
EVER FELT BAD OR GUILTY ABOUT YOUR DRINKING: NO

## 2024-09-19 ASSESSMENT — FIBROSIS 4 INDEX
FIB4 SCORE: 0.87
FIB4 SCORE: 0.87

## 2024-09-19 ASSESSMENT — GAIT ASSESSMENTS: GAIT LEVEL OF ASSIST: STANDBY ASSIST

## 2024-09-19 ASSESSMENT — PAIN DESCRIPTION - PAIN TYPE: TYPE: ACUTE PAIN

## 2024-09-19 NOTE — FLOWSHEET NOTE
09/19/24 1502   Events/Summary/Plan   Events/Summary/Plan Rt consult   Vital Signs   Pulse 86   Respiration 16   Pulse Oximetry 96 %   $ Pulse Oximetry (Spot Check) Yes   Respiratory Assessment   Level of Consciousness Alert   Chest Exam   Work Of Breathing / Effort Within Normal Limits   Breath Sounds   RUL Breath Sounds Clear   RML Breath Sounds Clear   RLL Breath Sounds Clear   ROBE Breath Sounds Clear   LLL Breath Sounds Clear   Oxygen   O2 Delivery Device Room air w/o2 available

## 2024-09-19 NOTE — DIETARY
Care Transition Team Final Discharge Disposition    Actual Discharge Information  Discharge Disposition: Disch to IP rehab facility or distinct part unit (62)    Pt to discharge to [facility] today. Transport set up at 2735-1685 via GMT. Per note, Pt's wife is aware. COBRa packet given to bedside nurse.

## 2024-09-19 NOTE — FLOWSHEET NOTE
09/19/24 1500   Protocol Assessment   Initial Assessment Yes   Patient History   Pulmonary Diagnosis None   Procedures Relevant to Respiratory Status None   Home O2 No   Nocturnal CPAP No   Home Treatments/Frequency No

## 2024-09-19 NOTE — DISCHARGE PLANNING
Insurance has authorized.  Kwesi remains medically cleared per Dr. Child.  Will discuss this case further with Administration this am.     0930-Case remains under review by Administration.     1018-Case is under review by Dr. Sousa.     1132-Dr. Sousa has accepted.  Unfortunately, I no longer have any beds available today.  Therefore, I have set up transport for tomorrow via GMT between 1282-5743.  Msg placed to Grace Muhammad CM, Eulogio ROGERSN & Santa, spouse. I do apologize for the delay.    1309-A bed has come available.  Transport has been changed to today between 0249-1644.  Msg placed to Grace Muhammad CM & Eulogio BSN. Santa, spouse is aware.

## 2024-09-19 NOTE — DISCHARGE SUMMARY
Discharge Summary    CHIEF COMPLAINT ON ADMISSION  Chief Complaint   Patient presents with    ALOC       Reason for Admission  Care Flight     Admission Date  9/12/2024    CODE STATUS  Full Code    HPI & HOSPITAL COURSE  34 y.o. male who presented 9/12/2024 with no significant past medical history, presented to outside facility with altered mentation. The patient presented to the sending facility with headaches, he was nonverbal at the time, GCS of 9, there were no focal deficits, the patient was unable to follow commands, a CT a CTA of the head showed some diffuse edema bilateral in the hemispheres. He was transferred for further neurological evaluation. Intubated on arrival and underwent a lumbar puncture with milky appearing CSF, PCR showed strep pneumo with greater than 15,000 white cell count, greater than 600 protein on CSF, the patient was admitted to ICU, started on vancomycin, ceftriaxone, steroids. MRI brain performed without evidence of venous thrombosis. Infectious disease consulted, the patient was transition to high-dose penicillin. Patient completed a course of steroids. PICC line placed. PT/OT recommended post acute placement which case management assisted in organizing. Patient will be on IV penicillin until 09/26. Patient improved clinically and was agreeable to discharge.     Therefore, he is discharged in fair and stable condition to an inpatient rehabilitation hospital.    The patient met 2-midnight criteria for an inpatient stay at the time of discharge.    Discharge Date  9/19/2024    FOLLOW UP ITEMS POST DISCHARGE  Please follow up with PCP in 3-5 days for post hospitalization follow up and medication reconciliation.     DISCHARGE DIAGNOSES  Principal Problem:    Streptococcus pneumoniae meningitis (POA: Yes)  Active Problems:    Acute encephalopathy (POA: Unknown)    Lumbar disc herniation (POA: Unknown)    On mechanically assisted ventilation (HCC) (POA: No)    Sepsis due to Streptococcus  pneumoniae with encephalopathy without septic shock (HCC) (POA: Unknown)    Bradycardia (POA: Unknown)  Resolved Problems:    * No resolved hospital problems. *      FOLLOW UP  No future appointments.  No follow-up provider specified.    MEDICATIONS ON DISCHARGE     Medication List        START taking these medications        Instructions   NS SOLN 100 mL with penicillin G potassium [id number] UNIT SOLR 4 Million Units   Infuse 4 Million Units into a venous catheter every 4 hours for 9 days.  Dose: 4 Million Units            CONTINUE taking these medications        Instructions   acetaminophen 500 MG Tabs  Commonly known as: Tylenol   Take 1,000 mg by mouth every 6 hours as needed (Headache). 2 tablets = 1,000 mg.  Dose: 1,000 mg     Melatonin 5 MG Chew   Chew 10 mg at bedtime as needed (Sleep). 2 tablets = 10 mg.  Dose: 10 mg            STOP taking these medications      Advil 200 MG Tabs  Generic drug: ibuprofen     Non Formulary Request              Allergies  No Known Allergies    DIET  No orders of the defined types were placed in this encounter.      LABORATORY  Lab Results   Component Value Date    SODIUM 142 09/17/2024    POTASSIUM 4.1 09/17/2024    CHLORIDE 106 09/17/2024    CO2 24 09/17/2024    GLUCOSE 159 (H) 09/17/2024    BUN 23 (H) 09/17/2024    CREATININE 0.78 09/17/2024        Lab Results   Component Value Date    WBC 6.7 09/17/2024    HEMOGLOBIN 12.6 (L) 09/17/2024    HEMATOCRIT 36.9 (L) 09/17/2024    PLATELETCT 199 09/17/2024        Total time of the discharge process exceeds 41 minutes.

## 2024-09-19 NOTE — PROGRESS NOTES
4 Eyes Skin Assessment Completed by MANE Guerrero and MANE Candelario.    Head WDL  Ears WDL  Nose WDL  Mouth WDL  Neck WDL  Breast/Chest WDL  Shoulder Blades WDL  Spine WDL  (R) Arm/Elbow/Hand WDL  (L) Arm/Elbow/Hand WDL  Abdomen WDL  Groin WDL  Scrotum/Coccyx/Buttocks WDL  (R) Leg WDL  (L) Leg WDL  (R) Heel/Foot/Toe WDL  (L) Heel/Foot/Toe WDL    Devices In Places Blood Pressure Cuff  Interventions In Place Pillows and Pressure Redistribution Mattress brought from home    Possible Skin Injury No    Pictures Uploaded Into Epic Yes  Wound Consult Placed N/A  RN Wound Prevention Protocol Ordered No

## 2024-09-19 NOTE — DISCHARGE PLANNING
CASE MANAGEMENT INITIAL ASSESSMENT    Admit Date:  9/19/2024     CM to meet with patient and his wife to discuss role of case management / discharge planning / team conference.   Patient is a  34 y.o. male transferred from [facility].    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        Previous Services Utilized:        Other Information:             Patient / Family Goal:  Patient / Family Goal: Return home    Plan:  1. Continue to follow patient through hospitalization and provide discharge planning in collaboration with patient, family, physicians and ancillary services.     2. Utilize community resources to ensure a safe discharge.

## 2024-09-19 NOTE — CARE PLAN
The patient is Stable - Low risk of patient condition declining or worsening    Shift Goals  Clinical Goals: monitor neuro status, rest  Patient Goals: rest/ go home  Family Goals: Get better, come home    Progress made toward(s) clinical / shift goals:    Problem: Hemodynamics  Goal: Patient's hemodynamics, fluid balance and neurologic status will be stable or improve  Outcome: Progressing     Problem: Respiratory  Goal: Patient will achieve/maintain optimum respiratory ventilation and gas exchange  Outcome: Progressing     Problem: Skin Integrity  Goal: Skin integrity is maintained or improved  Outcome: Progressing     Problem: Pain - Standard  Goal: Alleviation of pain or a reduction in pain to the patient’s comfort goal  Outcome: Progressing     Problem: Fall Risk  Goal: Patient will remain free from falls  Outcome: Progressing       Patient is not progressing towards the following goals:

## 2024-09-19 NOTE — PROGRESS NOTES
Pt arrived at [facility] from [facility] via [facility] transport. Dr. Hernandez to follow for diagnosis of Streptococcus pneumoniae meningitis . Initial assessment initiated. Pt oriented to room and facility routine and safety measures; pt education binder provided and discussed. Pt A/O x 4, Continent of bowel and bladder. Stand by assist for transfers. All wounds photographed and documented; photos uploaded to . Pt denies pain but complains of minor dizziness at this time. Pt positioned for comfort in bed. Call light within reach, safety measures in place.

## 2024-09-19 NOTE — H&P
"  Physical Medicine & Rehabilitation  History and Physical (H&P)  &     Post Admission Physician Evaluation (BLANCA)       Date of Admission: 9/19/2024  Date of Service: 9/19/2024   Kwesi Rivera    [facility] Code to Support Admission: 0002.1 - Brain Dysfunction: Non-Traumatic  Etiologic Diagnosis: Streptococcus pneumoniae meningitis    _______________________________________________    Chief Complaint: Decreased mobility, weakness    History of Present Illness:  Adapted from the PM&R Consult by Dr. Lee:   The patient is a 34 y.o.  male with no known PMHx;  who presented on 9/12/2024  5:50 PM with AMS after having a HA. Per documentation, patient had a headachge on 9/12, he laid down to take a nap and woke up with ASM. Upon eval at [facility] ED, patient was non verbal and had GCS 9. CT head and CTA head and neck had no obvious abnormalities but showed diffuse edema in the b/l hemispheres. Patient was transferred to [facility] for higher level of care, patient was intubated for AMS.  neurology was consulted, and patient had LP performed that showed \"milky\" CSF, with elevated WBC and protein in CSF. MRI brain obtained showed  hyperintense signal within the subarachnoid spaces concerning for meningitis. MRI L spine showed fluid in the T2 thecal sc, consistent with meningitis. MRV head negative for venous thrombosis.  PCR panel + for strep pneumonia. Blood cultures with NGTD.  ID consulted, patient was sarted on vanco and ceftriaxone, which was transitioned to  Penicillin G x 14 days, stop date is 9/26. Patient was extubated on 9/14.      Patient tolerated transfer to [facility]. Patient reports dizziness is improving. Denies NVD. Denies SOB.     Review of Systems:     Comprehensive 14 point ROS was reviewed and all were negative except as noted elsewhere in this document.     Past Medical History:  No past medical history on file.    Past Surgical History:  No past surgical history on file.    Family History:  No family history " on file.    Medications:  Current Facility-Administered Medications   Medication Dose    Pharmacy Consult Request ...Pain Management Review 1 Each  1 Each    hydrALAZINE (Apresoline) tablet 25 mg  25 mg    acetaminophen (Tylenol) tablet 650 mg  650 mg    senna-docusate (Pericolace Or Senokot S) 8.6-50 MG per tablet 2 Tablet  2 Tablet    And    polyethylene glycol/lytes (Miralax) Packet 1 Packet  1 Packet    docusate sodium (Enemeez) enema 283 mg  283 mg    magnesium hydroxide (Milk Of Magnesia) suspension 30 mL  30 mL    omeprazole (PriLOSEC) capsule 20 mg  20 mg    carboxymethylcellulose (Refresh Tears) 0.5 % ophthalmic drops 1 Drop  1 Drop    benzocaine-menthol (Cepacol) lozenge 1 Lozenge  1 Lozenge    mag hydrox-al hydrox-simeth (Maalox Plus Es Or Mylanta Ds) suspension 20 mL  20 mL    ondansetron (Zofran ODT) dispertab 4 mg  4 mg    Or    ondansetron (Zofran) syringe/vial injection 4 mg  4 mg    traZODone (Desyrel) tablet 50 mg  50 mg    sodium chloride (Ocean) 0.65 % nasal spray 2 Spray  2 Spray    traMADol (Ultram) 50 MG tablet 50 mg  50 mg    midazolam (VERSED) 5 mg/mL (1 mL vial)  5 mg    enoxaparin (Lovenox) inj 40 mg  40 mg    penicillin G potassium 4 Million Units in  mL IVPB  4 Million Units    ibuprofen (Motrin) tablet 400 mg  400 mg    melatonin tablet 10.5 mg  10.5 mg       Allergies:  Patient has no known allergies.    Psychosocial History:  Living Site:  Home  Living With:  family  Caregiver's availability:  Not Applicable  Number of stairs:  Not Applicable  Prior Level of Function / Living Situation:   Physical Therapy: Prior Services: None  Housing / Facility: Unable To Determine At This Time  Bed Mobility: Independent  Transfer Status: Independent  Ambulation: Independent  Assistive Devices Used: None  Stairs: Independent  Current Level of Function:   Gait Level Of Assist: Unable to Participate  Rolling: Contact Guard Assist  Comments: Able to roll self from L sidelying-supine, supine-R  "sidelying; Able to bring his LE OOB, however upon attempt to bring his trunk upright, he became impulsive, restless and required to be returned back to supine for safety. RN was alerted, his sedation was turned back on.  Sit to Stand: Unable to Participate  Bed, Chair, Wheelchair Transfer: Unable to Participate     Occupational Therapy:      Prior Services: None  Housing / Facility: Unable To Determine At This Time  Current Level of Function:   Upper Body Dressing: Total Assist  Lower Body Dressing: Total Assist      CURRENT LEVEL OF FUNCTION:   Same as level of function prior to admission to [facility]    Physical Examination:     VITAL SIGNS:   height is 1.676 m (5' 6\") and weight is 79.2 kg (174 lb 8 oz). His oral temperature is 36.9 °C (98.4 °F). His blood pressure is 106/66 and his pulse is 80. His respiration is 18 and oxygen saturation is 91%.   GENERAL: No apparent distress  HEENT: Normocephalic/atraumatic, EOMI, and PERRL  CARDIAC: Regular rate and rhythm, normal S1, S2   LUNGS: Clear to auscultation   ABDOMINAL: bowel sounds present, soft, and nontender    EXTREMITIES: no contractures, spasticity, or edema.    NEURO:  Mental status:  A&Ox4 (person, place, date, situation) answers questions appropriately  Speech: fluent, no aphasia or dysarthria  CRANIAL NERVES: Face symmetric    Motor:    5/5 BUE  5/5 BLE  Sensory: intact to light touch through out      Radiology:    Results for orders placed during the hospital encounter of 09/12/24    MR-BRAIN-WITH & W/O    Impression  1.  The FLAIR images demonstrates diffuse hyperintense signal within the subarachnoid spaces. There is also minimal abnormal ependymal T2 hyperintensity. There is no hydrocephalus. There is no abnormal contrast enhancement. This finding is concerning for  acute Meningitis/meningeal inflammation. Please correlate with the CSF study.  2.  Mild tonsillar ectopia.                 Results for orders placed during the " hospital encounter of 09/12/24    MR-CERVICAL SPINE-WITH & W/O    Impression  1.  C5-6 minimal midline disc/osteophyte complex. No central or foraminal stenosis.  2.  C6-C7 moderate-marked right foraminal stenosis due to spondylotic change.  3.  No myelopathic cord signal. Concerning the history of meningitis, no abnormal intradural extra medullary enhancement.      Results for orders placed during the hospital encounter of 09/12/24    MR-LUMBAR SPINE-WITH & W/O    Impression  1.  T2 hypointense fluid-fluid level in the dependent caudal thecal sac in keeping with the history of meningitis. This likely represents intradural exudate in the setting of meningitis. Dependently layering subarachnoid hemorrhage within the thecal sac  could have a similar appearance.  2.  L4-5 tiny midline disc bulge with underlying tiny annular fissure. No central or foraminal stenosis.  3.  L5-S1 large left paramedian disc protrusion/extrusion with marked compromise of the emerging left S1 nerve root. Mild right foraminal stenosis. Moderate left foraminal stenosis.       Results for orders placed during the hospital encounter of 09/12/24    MR-THORACIC SPINE-WITH & W/O    Impression  MRI OF THE THORACIC SPINE WITHOUT AND WITH CONTRAST WITHIN NORMAL LIMITS.                                      Laboratory Values:  Recent Labs     09/20/24  0554   SODIUM 136   POTASSIUM 4.2   CHLORIDE 106   CO2 18*   GLUCOSE 99   BUN 18   CREATININE 1.05   CALCIUM 8.8     Recent Labs     09/20/24  0554   WBC 6.0   RBC 4.99   HEMOGLOBIN 14.9   HEMATOCRIT 43.0   MCV 86.2   MCH 29.9   MCHC 34.7   RDW 38.1   PLATELETCT 222   MPV 9.3           Primary Rehabilitation Diagnosis:    This patient is a 34 y.o. male admitted for acute inpatient rehabilitation with Streptococcus pneumoniae meningitis.    Impairments:   Cognitive  ADLs/IADLs  Mobility    Secondary Diagnosis/Medical Co-morbidities Affecting Function  S1 L nerve root  compression  Anemia  Hyperglycemia  Azotemia    Relevant Changes Since Preadmission Evaluation:    Status unchanged    The patient's rehabilitation potential is Good  The patient's medical prognosis is good    Rehabilitation Plan:   Discussion and Recommendations:   1. The patient requires an acute inpatient rehabilitation program with a coordinated program of care at an intensity and frequency not available at a lower level of care. This recommendation is substantiated by the patient's medical physicians who recommend that the patient's intervention and assessment of medical issues needs to be done at an acute level of care for patient's safety and maximum outcome.   2. A coordinated program of care will be supplied by an interdisciplinary team of physical therapy, occupational therapy, rehab physician, rehab nursing, and, if needed, speech therapy and rehab psychology. Rehab team presents a patient-specific rehabilitation and education program concentrating on prevention of future problems related to accessibility, mobility, skin, bowel, bladder, sexuality, and psychosocial and medical/surgical problems.   3. Need for Rehabilitation Physician: The rehab physician will be evaluating the patient on a multi-weekly basis to help coordinate the program of care. The rehab physician communicates between medical physicians, therapists, and nurses to maximize the patient's potential outcome. Specific areas in which the rehab physician will be providing daily assessment include the following:   A. Assessing the patient's heart rate and blood pressure response (vitals monitoring) to activity and making adjustments in medications or conservative measures as needed.   B. The rehab physician will be assessing the frequency at which the program can be increased to allow the patient to reach optimal functional outcome.   C. The rehab physician will also provide assessments in daily skin care, especially in light of patient's  impairments in mobility.   D. The rehab physician will provide special expertise in understanding how to work with functional impairment and recommend appropriate interventions, compensatory techniques, and education that will facilitate the patient's outcome.   4. Rehab R.N.   The rehab RN will be working with patient to carry over in room mobility and activities of daily living when the patient is not in 3 hours of skilled therapy. Rehab nursing will be working in conjunction with rehab physician to address all the medical issues above and continue to assess laboratory work and discuss abnormalities with the treating physicians, assess vitals, and response to activity, and discuss and report abnormalities with the rehab physician. Rehab RN will also continue daily skin care, supervise bladder/bowel program, instruct in medication administration, and ensure patient safety.   5. Rehab Therapy: Therapies to treat at intensity and frequency of (may change after completion of evaluation by all therapeutic disciplines):       PT:  Physical therapy to address mobility, transfer, gait training and evaluation for adaptive equipment needs 1 hour/day at least 5 days/week for the duration of the ELOS (see below)       OT:  Occupational therapy to address ADLs, self-care, home management training, functional mobility/transfers and assistive device evaluation, and community re-integration 1  hour/day at least 5 days/week for the duration of the ELOS (see below).        ST/Dysphagia:  Speech therapy to address speech, language, and cognitive deficits as well as swallowing difficulties with retraining/dysphagia management and community re-integration with comprehension, expression, cognitive training 1  hour/day at least 5 days/week for the duration of the ELOS (see below).     Medical management / Rehabilitation Issues/ Adverse Potential as part of rehabilitation plan     Rehabilitation Issues/Adverse Potential  1.   Streptococcal meningitis - Patient with sepsis and meningitis with strep with ID consultation recommending 14 days Penicillin. End date is 9/27/24. Patient demonstrates functional deficits in strength, balance, coordination, and ADL's. Patient is admitted to [facility] for comprehensive rehabilitation therapy as described below.   Rehabilitation nursing monitors bowel and bladder control, educates on medication administration, co-morbidities and monitors patient safety.    2.  Neurostimulants: None at this time but continue to assess daily for need to initiate should status change.    3.  DVT prophylaxis:  Patient is on Lovenox for anticoagulation upon transfer. Encourage OOB. Monitor daily for signs and symptoms of DVT including but not limited to swelling and pain to prevent the development of DVT that may interfere with therapies.    4.  GI prophylaxis:  On prilosec to prevent gastritis/dyspepsia which may interfere with therapies.    5.  Pain: No issues with pain currently / Controlled with APAP/Tramadol    6.  Nutrition/Dysphagia: Dietician monitors nutrient intake, recommend supplements prn and provide nutrition education to pt/family to promote optimal nutrition for wound healing/recovery.     7.  Bladder/bowel:  Start bowel and bladder program as described below, to prevent constipation, urinary retention (which may lead to UTI), and urinary incontinence (which will impact upon pt's functional independence).   - Post void bladder scans, I&O cath for PVRs >400  - up to commode after meal     8.  Skin/dermal ulcer prophylaxis: Monitor for new skin conditions with q.2 h. turns as required to prevent the development of skin breakdown.     9.  Cognition/Behavior: As needed psychologist provides adjustment counseling to illness and psychosocial barriers that may be potential barriers to rehabilitation.     10. Respiratory therapy: RT performs O2 management prn, breathing retraining, pulmonary  hygiene and bronchospasm management prn to optimize participation in therapies.     Medical Co-Morbidities/Adverse Potential Affecting Function:  Streptococcal meningitis - Patient with sepsis and meningitis with strep with ID consultation recommending 14 days Penicillin. End date is 9/27/24  -PT and OT for mobility and ADLs. Per guidelines, 15 hours per week between PT, OT and/or SLP.  -Follow-up Neurology.     S1 L nerve root compression - Found on MRI of L spine, will need outpatient follow-up    Anemia - Check AM CBC    Hyperglycemia - Check AM CMP, was on steroids    Azotemia - Check AM CMP    Pain - Patient on PRN Tylenol, Ibuprofen and Tramadol    Skin - Patient at risk for skin breakdown due to debility in areas including sacrum, achilles, elbows and head in addition to other sites. Nursing to assess skin daily.     GI Ppx - Patient on Prilosec for GERD prophylaxis. Patient on Senna-docusate for constipation prophylaxis.   Last BM: 09/19/24    DVT Ppx - Patient Lovenox on transfer.    I personally performed a complete drug regimen review and no potential clinically significant medication issues were identified.     Goals/Expected Level of Function Based on Current Medical and Functional Status:  (may change based on patient's medical status and rate of impairment recovery)  Transfers:   Modified Independent  Mobility/Gait:   Modified Independent  ADL's:   Modified Independent  Cognition:  William    DISPOSITION: Discharge to pre-morbid independent living setting with the supportive care of patient's family.    ELOS: 7-10 days  ____________________________________    T. Manish Sousa MD/PhD  [facility] - Physical Medicine & Rehabilitation   [facility] - Brain Injury Medicine   ____________________________________    Pt was seen today for 76 min. Time spent included pre-admission screening, pre-admission review of vitals and laboratory values/tests, unit/floor time, face-to-face time with the patient including physical  examination, care coordination, counseling of patient and/or family, ordering medications/procedures/tests, discussion with other healthcare providers, and/or documentation in the electronic medical record.

## 2024-09-19 NOTE — PROGRESS NOTES
NURSING DAILY NOTE    Name: Kwesi Rivera   Date of Admission: 9/19/2024   Admitting Diagnosis: Streptococcus pneumoniae meningitis  Attending Physician: BELGICA BAIRD M.D.  Allergies: Patient has no known allergies.    Safety  Patient Assist     Patient Precautions     Precaution Comments     Bed Transfer Status     Toilet Transfer Status      Assistive Devices     Oxygen  None - Room Air  Diet/Therapeutic Dining  Current Diet Order   Procedures    Diet Order Diet: Regular     Pill Administration  whole  Agitated Behavioral Scale     ABS Level of Severity       Fall Risk  Has the patient had a fall this admission?   No  Lola [place] Fall Risk Scoring  3, NO RISK  Fall Risk Safety Measures  bed alarm and chair alarm    Vitals  Temperature: 36.6 °C (97.8 °F)  Temp src: Oral  Pulse: 86  Respiration: 18  Blood Pressure: 120/74  Blood Pressure MAP (Calculated): 89 MM HG  BP Location: Left, Upper Arm  Patient BP Position: Sitting     Oxygen  Pulse Oximetry: 96 %  O2 Delivery Device: None - Room Air    Bowel and Bladder  Last Bowel Movement  09/18/24  Stool Type     Bowel Device     Continent  Bladder: Continent void   Bowel: Continent movement  Bladder Function     Genitourinary Assessment   Bladder Assessment (WDL):  Within Defined Limits    Skin  Kenneth Score   20  Sensory Interventions      Moisture Interventions         Pain  Pain Rating Scale  0 - No Pain  Pain Location     Pain Location Orientation     Pain Interventions   Distraction    ADLs    Bathing      Linen Change      Personal Hygiene     Chlorhexidine Bath      Oral Care     Teeth/Dentures     Shave     Nutrition Percentage Eaten     Environmental Precautions     Patient Turns/Positioning  Patient Turns Self from Side to Side  Patient Turns Assistance/Tolerance     Bed Positions     Head of Bed Elevated         Psychosocial/Neurologic Assessment  Psychosocial Assessment  Psychosocial (WDL):   Within Defined Limits  Neurologic Assessment  Neuro (WDL): Exceptions to WDL  Level of Consciousness: Alert  Orientation Level: Oriented X4  Cognition: Appropriate judgement  Speech: Clear  Pupil Assesment: No  EENT (WDL):  Within Defined Limits    Cardio/Pulmonary Assessment  Edema      Respiratory Breath Sounds  RUL Breath Sounds: Clear  RML Breath Sounds: Clear  RLL Breath Sounds: Clear  ROBE Breath Sounds: Clear  LLL Breath Sounds: Clear  Cardiac Assessment   Cardiac (WDL):  Within Defined Limits

## 2024-09-20 ENCOUNTER — APPOINTMENT (OUTPATIENT)
Dept: SPEECH THERAPY | Facility: REHABILITATION | Age: 34
DRG: 095 | End: 2024-09-20
Attending: PHYSICAL MEDICINE & REHABILITATION
Payer: OTHER GOVERNMENT

## 2024-09-20 ENCOUNTER — APPOINTMENT (OUTPATIENT)
Dept: OCCUPATIONAL THERAPY | Facility: REHABILITATION | Age: 34
DRG: 095 | End: 2024-09-20
Attending: PHYSICAL MEDICINE & REHABILITATION
Payer: OTHER GOVERNMENT

## 2024-09-20 ENCOUNTER — APPOINTMENT (OUTPATIENT)
Dept: PHYSICAL THERAPY | Facility: REHABILITATION | Age: 34
DRG: 095 | End: 2024-09-20
Attending: PHYSICAL MEDICINE & REHABILITATION
Payer: OTHER GOVERNMENT

## 2024-09-20 LAB
25(OH)D3 SERPL-MCNC: 25 NG/ML (ref 30–100)
ALBUMIN SERPL BCP-MCNC: 3.2 G/DL (ref 3.2–4.9)
ALBUMIN/GLOB SERPL: 1.3 G/DL
ALP SERPL-CCNC: 41 U/L (ref 30–99)
ALT SERPL-CCNC: 99 U/L (ref 2–50)
ANION GAP SERPL CALC-SCNC: 12 MMOL/L (ref 7–16)
AST SERPL-CCNC: 41 U/L (ref 12–45)
BASOPHILS # BLD AUTO: 0 % (ref 0–1.8)
BASOPHILS # BLD: 0 K/UL (ref 0–0.12)
BILIRUB SERPL-MCNC: 0.8 MG/DL (ref 0.1–1.5)
BUN SERPL-MCNC: 18 MG/DL (ref 8–22)
CALCIUM ALBUM COR SERPL-MCNC: 9.4 MG/DL (ref 8.5–10.5)
CALCIUM SERPL-MCNC: 8.8 MG/DL (ref 8.5–10.5)
CHLORIDE SERPL-SCNC: 106 MMOL/L (ref 96–112)
CO2 SERPL-SCNC: 18 MMOL/L (ref 20–33)
CREAT SERPL-MCNC: 1.05 MG/DL (ref 0.5–1.4)
EOSINOPHIL # BLD AUTO: 0.21 K/UL (ref 0–0.51)
EOSINOPHIL NFR BLD: 3.5 % (ref 0–6.9)
ERYTHROCYTE [DISTWIDTH] IN BLOOD BY AUTOMATED COUNT: 38.1 FL (ref 35.9–50)
EST. AVERAGE GLUCOSE BLD GHB EST-MCNC: 114 MG/DL
GFR SERPLBLD CREATININE-BSD FMLA CKD-EPI: 95 ML/MIN/1.73 M 2
GLOBULIN SER CALC-MCNC: 2.4 G/DL (ref 1.9–3.5)
GLUCOSE SERPL-MCNC: 99 MG/DL (ref 65–99)
HBA1C MFR BLD: 5.6 % (ref 4–5.6)
HCT VFR BLD AUTO: 43 % (ref 42–52)
HGB BLD-MCNC: 14.9 G/DL (ref 14–18)
LYMPHOCYTES # BLD AUTO: 2.33 K/UL (ref 1–4.8)
LYMPHOCYTES NFR BLD: 38.9 % (ref 22–41)
MANUAL DIFF BLD: NORMAL
MCH RBC QN AUTO: 29.9 PG (ref 27–33)
MCHC RBC AUTO-ENTMCNC: 34.7 G/DL (ref 32.3–36.5)
MCV RBC AUTO: 86.2 FL (ref 81.4–97.8)
MICROCYTES BLD QL SMEAR: NORMAL
MONOCYTES # BLD AUTO: 0.43 K/UL (ref 0–0.85)
MONOCYTES NFR BLD AUTO: 7.1 % (ref 0–13.4)
MORPHOLOGY BLD-IMP: NORMAL
NEUTROPHILS # BLD AUTO: 3.03 K/UL (ref 1.82–7.42)
NEUTROPHILS NFR BLD: 50.5 % (ref 44–72)
NRBC # BLD AUTO: 0 K/UL
NRBC BLD-RTO: 0 /100 WBC (ref 0–0.2)
PLATELET # BLD AUTO: 222 K/UL (ref 164–446)
PLATELET BLD QL SMEAR: NORMAL
PMV BLD AUTO: 9.3 FL (ref 9–12.9)
POTASSIUM SERPL-SCNC: 4.2 MMOL/L (ref 3.6–5.5)
PROT SERPL-MCNC: 5.6 G/DL (ref 6–8.2)
RBC # BLD AUTO: 4.99 M/UL (ref 4.7–6.1)
RBC BLD AUTO: PRESENT
SODIUM SERPL-SCNC: 136 MMOL/L (ref 135–145)
TSH SERPL DL<=0.005 MIU/L-ACNC: 3.63 UIU/ML (ref 0.38–5.33)
WBC # BLD AUTO: 6 K/UL (ref 4.8–10.8)

## 2024-09-20 PROCEDURE — 700105 HCHG RX REV CODE 258: Performed by: PHYSICAL MEDICINE & REHABILITATION

## 2024-09-20 PROCEDURE — A9270 NON-COVERED ITEM OR SERVICE: HCPCS | Performed by: PHYSICAL MEDICINE & REHABILITATION

## 2024-09-20 PROCEDURE — 36415 COLL VENOUS BLD VENIPUNCTURE: CPT

## 2024-09-20 PROCEDURE — 99233 SBSQ HOSP IP/OBS HIGH 50: CPT | Performed by: PHYSICAL MEDICINE & REHABILITATION

## 2024-09-20 PROCEDURE — 85007 BL SMEAR W/DIFF WBC COUNT: CPT

## 2024-09-20 PROCEDURE — 97161 PT EVAL LOW COMPLEX 20 MIN: CPT

## 2024-09-20 PROCEDURE — 97165 OT EVAL LOW COMPLEX 30 MIN: CPT

## 2024-09-20 PROCEDURE — 770010 HCHG ROOM/CARE - REHAB SEMI PRIVAT*

## 2024-09-20 PROCEDURE — 83036 HEMOGLOBIN GLYCOSYLATED A1C: CPT

## 2024-09-20 PROCEDURE — 97535 SELF CARE MNGMENT TRAINING: CPT

## 2024-09-20 PROCEDURE — 80053 COMPREHEN METABOLIC PANEL: CPT

## 2024-09-20 PROCEDURE — 85027 COMPLETE CBC AUTOMATED: CPT

## 2024-09-20 PROCEDURE — 700111 HCHG RX REV CODE 636 W/ 250 OVERRIDE (IP): Mod: JZ | Performed by: PHYSICAL MEDICINE & REHABILITATION

## 2024-09-20 PROCEDURE — 92523 SPEECH SOUND LANG COMPREHEN: CPT

## 2024-09-20 PROCEDURE — 84443 ASSAY THYROID STIM HORMONE: CPT

## 2024-09-20 PROCEDURE — 97116 GAIT TRAINING THERAPY: CPT

## 2024-09-20 PROCEDURE — 82306 VITAMIN D 25 HYDROXY: CPT

## 2024-09-20 PROCEDURE — 700102 HCHG RX REV CODE 250 W/ 637 OVERRIDE(OP): Performed by: PHYSICAL MEDICINE & REHABILITATION

## 2024-09-20 PROCEDURE — 97530 THERAPEUTIC ACTIVITIES: CPT

## 2024-09-20 RX ORDER — VITAMIN B COMPLEX
1000 TABLET ORAL DAILY
Status: DISCONTINUED | OUTPATIENT
Start: 2024-09-20 | End: 2024-09-20

## 2024-09-20 RX ORDER — VITAMIN B COMPLEX
1000 TABLET ORAL DAILY
Status: DISCONTINUED | OUTPATIENT
Start: 2024-09-21 | End: 2024-09-25 | Stop reason: HOSPADM

## 2024-09-20 RX ADMIN — SODIUM CHLORIDE 4 MILLION UNITS: 9 INJECTION, SOLUTION INTRAVENOUS at 01:46

## 2024-09-20 RX ADMIN — SODIUM CHLORIDE 4 MILLION UNITS: 9 INJECTION, SOLUTION INTRAVENOUS at 21:42

## 2024-09-20 RX ADMIN — IBUPROFEN 400 MG: 400 TABLET, FILM COATED ORAL at 17:59

## 2024-09-20 RX ADMIN — SODIUM CHLORIDE 4 MILLION UNITS: 9 INJECTION, SOLUTION INTRAVENOUS at 10:00

## 2024-09-20 RX ADMIN — IBUPROFEN 400 MG: 400 TABLET, FILM COATED ORAL at 05:50

## 2024-09-20 RX ADMIN — SODIUM CHLORIDE 4 MILLION UNITS: 9 INJECTION, SOLUTION INTRAVENOUS at 14:02

## 2024-09-20 RX ADMIN — ENOXAPARIN SODIUM 40 MG: 100 INJECTION SUBCUTANEOUS at 18:06

## 2024-09-20 RX ADMIN — SODIUM CHLORIDE 4 MILLION UNITS: 9 INJECTION, SOLUTION INTRAVENOUS at 05:48

## 2024-09-20 RX ADMIN — OMEPRAZOLE 20 MG: 20 CAPSULE, DELAYED RELEASE ORAL at 08:11

## 2024-09-20 RX ADMIN — SODIUM CHLORIDE 4 MILLION UNITS: 9 INJECTION, SOLUTION INTRAVENOUS at 18:00

## 2024-09-20 RX ADMIN — Medication 10.5 MG: at 19:23

## 2024-09-20 ASSESSMENT — BRIEF INTERVIEW FOR MENTAL STATUS (BIMS)
ASKED TO RECALL SOCK: YES, NO CUE REQUIRED
WHAT YEAR IS IT: CORRECT
WHAT DAY OF THE WEEK IS IT: CORRECT
ASKED TO RECALL BLUE: YES, NO CUE REQUIRED
BIMS SUMMARY SCORE: 15
ASKED TO RECALL BED: YES, NO CUE REQUIRED
ASKED TO RECALL BLUE: YES, NO CUE REQUIRED
INITIAL REPETITION OF BED BLUE SOCK - FIRST ATTEMPT: 3
INITIAL REPETITION OF BED BLUE SOCK - FIRST ATTEMPT: 3
ASKED TO RECALL BED: YES, NO CUE REQUIRED
ASKED TO RECALL SOCK: YES, NO CUE REQUIRED
BIMS SUMMARY SCORE: 15
WHAT YEAR IS IT: CORRECT
WHAT MONTH IS IT: ACCURATE WITHIN 5 DAYS
WHAT DAY OF THE WEEK IS IT: CORRECT
WHAT MONTH IS IT: ACCURATE WITHIN 5 DAYS

## 2024-09-20 ASSESSMENT — GAIT ASSESSMENTS
GAIT LEVEL OF ASSIST: SUPERVISED
DEVIATION: NO DEVIATION
DISTANCE (FEET): 500

## 2024-09-20 ASSESSMENT — ACTIVITIES OF DAILY LIVING (ADL): TOILETING: INDEPENDENT

## 2024-09-20 ASSESSMENT — PAIN DESCRIPTION - PAIN TYPE: TYPE: ACUTE PAIN

## 2024-09-20 NOTE — THERAPY
Physical Therapy   Initial Evaluation     Patient Name: Kwesi Rivera  Age:  34 y.o., Sex:  male  Medical Record #: [id number]  Today's Date: 9/20/2024     Subjective    Pt is in his bed and agreeable to participate in therapy.      Objective       09/20/24 1301   PT Charge Group   Charges Yes   PT Gait Training (Units) 1   PT Therapeutic Activities (Units) 1   PT Evaluation PT Evaluation Low   PT Total Time Spent   PT Individual Total Time Spent (Mins) 60   Precautions   Comments high functional   Vitals   Pulse 62   Patient BP Position Sitting   Blood Pressure 118/75   Respiration 18   Pulse Oximetry 96 %   O2 Delivery Device None - Room Air   Pain   Intervention Declines;Rest   Pain 0 - 10 Group   Location Head   Pain Rating Scale (NPRS) 0   Cognition    Orientation Level Oriented x 4   Level of Consciousness Alert   ABS (Agitated Behavior Scale)   Agitated Behavior Scale Performed No   Gait Functional Level of Assist    Gait Level Of Assist Supervised   Assistive Device None   Distance (Feet) 500  (in different surfaces)   # of Times Distance was Traveled 1   Deviation No deviation   Stairs Functional Level of Assist   Level of Assist with Stairs Independent   # of Stairs Climbed 12   Transfer Functional Level of Assist   Bed, Chair, Wheelchair Transfer Independent   Bed Mobility    Supine to Sit Independent   Sit to Supine Independent   Sit to Stand Independent   Scooting Independent   Rolling Independent   Interdisciplinary Plan of Care Collaboration   IDT Collaboration with  Occupational Therapist;Family / Caregiver   Patient Position at End of Therapy Seated;Family / Friend in Room   Collaboration Comments carlee DODSON is pressent during session   Physical Therapist Assigned   Assigned PT / Treatment Time / Comments PT 30/60   Strengths & Barriers   Strengths Able to follow instructions;Adequate strength;Independent prior level of function;Pleasant and cooperative;Supportive family;Alert and oriented;Good  balance   Roll Left and Right   Assistance Needed Independent   CARE Score - Roll Left and Right 6   Sit to Lying   Assistance Needed Independent   CARE Score - Sit to Lying 6   Lying to Sitting on Side of Bed   Assistance Needed Independent   CARE Score - Lying to Sitting on Side of Bed 6   Sit to Stand   Assistance Needed Independent   CARE Score - Sit to Stand 6   Chair/Bed-to-Chair Transfer   Assistance Needed Independent   CARE Score - Chair/Bed-to-Chair Transfer 6   Car Transfer   Assistance Needed Independent   CARE Score - Car Transfer 6   Walk 10 Feet   Assistance Needed Independent   CARE Score - Walk 10 Feet 6   Walk 50 Feet with Two Turns   Assistance Needed Independent   CARE Score - Walk 50 Feet with Two Turns 6   Walk 150 Feet   Assistance Needed Independent   CARE Score - Walk 150 Feet 6   Walking 10 Feet on Uneven Surfaces   Assistance Needed Supervision   CARE Score - Walking 10 Feet on Uneven Surfaces 4   1 Step (Curb)   Assistance Needed Independent   CARE Score - 1 Step (Curb) 6   4 Steps   Assistance Needed Independent   CARE Score - 4 Steps 6   12 Steps   Assistance Needed Independent   CARE Score - 12 Steps 6   Picking Up Object   Assistance Needed Independent   CARE Score - Picking Up Object 6   Wheel 50 Feet with Two Turns   Reason if not Attempted Activity not applicable   CARE Score - Wheel 50 Feet with Two Turns 9   Type of Wheelchair/Scooter Manual   Wheel 150 Feet   Reason if not Attempted Activity not applicable   CARE Score - Wheel 150 Feet 9   Type of Wheelchair/Scooter Manual       Assessment  Patient is 34 y.o. male with a diagnosis of The patient is a 34 y.o.  male with no known PMHx;  who presented on 9/12/2024  5:50 PM with AMS after having a HA. Per documentation, patient had a headachge on 9/12, he laid down to take a nap and woke up with ASM. Upon eval at [facility] ED, patient was non verbal and had GCS 9. CT head and CTA head and neck had no obvious abnormalities but  "showed diffuse edema in the b/l hemispheres. Patient was transferred to [facility] for higher level of care, patient was intubated for AMS.  neurology was consulted, and patient had LP performed that showed \"milky\" CSF, with elevated WBC and protein in CSF. MRI brain obtained showed  hyperintense signal within the subarachnoid spaces concerning for meningitis. MRI L spine showed fluid in the T2 thecal sc, consistent with meningitis. MRV head negative for venous thrombosis.  PCR panel + for strep pneumonia. Blood cultures with NGTD.  ID consulted, patient was sarted on vanco and ceftriaxone, which was transitioned to  Penicillin G x 14 days, stop date is 9/26. Patient was extubated on 9/14.  .  Additional factors influencing patient status / progress (ie: cognitive factors, co-morbidities, social support, etc): Pt demonstrated higher level in functional mobility. .      Plan  Recommend Physical Therapy 30-60 minutes per day 5-7 days per week for 4-5  days for the following treatments:  PT Gait Training, PT Therapeutic Exercises, PT Neuro Re-Ed/Balance, PT Therapeutic Activity, and PT Evaluation.    Passport items to be completed:  Get in/out of bed safely, in/out of a vehicle, safely use mobility device, walk or wheel around home/community, navigate up and down stairs, show how to get up/down from the ground, ensure home is accessible, demonstrate HEP, complete caregiver training    Goals:  Long term and short term goals have been discussed with patient and spouse and they are in agreement.    Physical Therapy Problems (Active)       Problem: Mobility       Dates: Start:  09/20/24         Goal: STG-Within one week, patient will ambulate community distances in different surfaces, independent x 1500'       Dates: Start:  09/20/24               Problem: PT-Long Term Goals       Dates: Start:  09/20/24         Goal: LTG- Patient will be able to participate in community outing and reintegration.       Dates: Start:  09/20/24  "

## 2024-09-20 NOTE — THERAPY
Recreational Therapy   Initial Evaluation     Patient Name: Kwesi Rivera  Age:  34 y.o., Sex:  male  Medical Record #: [id number]  Today's Date: 9/20/2024     Subjective    Patient was agreeable to meet for an unscheduled session to participate in an assessment interview.    Objective       09/20/24 1500   Procedural Tracking   Procedural Tracking Community Re-Integration;Leisure Skills Awareness   Treatment Time   Total Time Spent (mins) 15   Leisure History   Leisure Interests Exercise;Travel;Sports;Reading;Family   Pre-Morbid Leisure Lifestyle Individual;Group;Active   Prior Living Arrangements   Lives with - Patient's Self Care Capacity Spouse;Child Less than 18 Years of Age   Steps Into Home 0   Steps In Home 14   Ambulation Independent   Assistive Devices Used None   Driving / Transportation Driving Independent   Functional Ability Status - Physical   Right  Strong   Left  Strong   Right Arm Strong   Left Arm Strong   Right Leg Strong   Left Leg Strong   Upper Extremity Gross Motor Uses Both Arms / Hands   Lower Extremity Gross Motor Uses Both Legs   Fine Motor Manipulates Small Objects;Manipulates Large Objects   Functional Ability Status - Cognitive   Attention Span Remains on Task   Comprehension Follows Three Step Commands   Judgment Able to Make Independent Decisions   Functional Ability Status - Emotional    Affect Appropriate   Mood Appropriate   Behavior Cooperative;Appropriate   Leisure Competence Measure   Leisure Awareness Independent   Leisure Attitude Independent   Leisure Skills Independent   Cultural / Social Behaviors Independent   Interpersonal Skills Independent   Community Integration Skills Independent   Social Contact Independent   Community Participation Independent   Current Discharge Plan   Current Discharge Plan Return to Prior Living Situation   Interdisciplinary Plan of Care Collaboration   IDT Collaboration with  Family / Caregiver   Patient Position at End of Therapy In  "Bed;Family / Friend in Room   Collaboration Comments mom in session   Strengths & Barriers   Strengths Able to follow instructions;Alert and oriented;Independent prior level of function;Pleasant and cooperative;Supportive family;Willingly participates in therapeutic activities;Motivated for self care and independence       Assessment  Patient is 34 y.o. 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  Additional factors influencing patient status / progress (ie: cognitive factors, co-morbidities, social support, etc): Patient has a good support system, and is an active  member.      Per H&P, patient presented on 9/12/2024  5:50 PM with AMS after having a HA. Per documentation, patient had a headachge on 9/12, he laid down to take a nap and woke up with ASM. Upon eval at [facility] ED, patient was non verbal and had GCS 9. CT head and CTA head and neck had no obvious abnormalities but showed diffuse edema in the b/l hemispheres. Patient was transferred to [facility] for higher level of care, patient was intubated for AMS.  neurology was consulted, and patient had LP performed that showed \"milky\" CSF, with elevated WBC and protein in CSF. MRI brain obtained showed  hyperintense signal within the subarachnoid spaces concerning for meningitis. MRI L spine showed fluid in the T2 thecal sc, consistent with meningitis. MRV head negative for venous thrombosis.  PCR panel + for strep pneumonia. Blood cultures with NGTD.  ID consulted, patient was sarted on vanco and ceftriaxone, which was transitioned to  Penicillin G x 14 days, stop date is 9/26. Patient was extubated on 9/14.       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 He reports that he flies with his friends, has supportive neighbors, wife and mother.  Patient reports that " he opal with prayer. Currently, patient is reporting no symptoms other than a mild headache.       Plan  Certified Therapeutic Recreation Specialist has screened this patient and determined that no skilled Recreation Therapy services are indicated at this time due to patient having healthy leisure that he can return to and not appropriate for Recreation Therapy.  Thank you for your referral. If a change in patient's function should occur, Recreation Therapy can reevaluate for appropriateness at that time.

## 2024-09-20 NOTE — CARE PLAN
Problem: Mobility  Goal: Patient's capacity to carry out activities will improve  Outcome: Progressing   The patient is Stable - Low risk of patient condition declining or worsening    Shift Goals  Clinical Goals: Safety  Patient Goals: Safety    Progress made toward(s) clinical / shift goals:  Pt is aaox4 and up self with steady gait.  PT denies dizziness or pain.  PT calls for assistance for additional safety.  Call light within reach     Patient is not progressing towards the following goals:

## 2024-09-20 NOTE — CARE PLAN
Problem: Knowledge Deficit - Standard  Goal: Patient and family/care givers will demonstrate understanding of plan of care, disease process/condition, diagnostic tests and medications  Outcome: Progressing   Pt education given regarding plan of care with emphasis on adequate hydration, pt shows good understanding and follow through, will continue to reinforce education and continue to monitor.         Problem: Fall Risk - Rehab  Goal: Patient will remain free from falls  Outcome: Progressing   Pt education given regarding fall precautions AND safety measures, pt shows good understanding, pt is mod I during day in , can self transfer this shift, will continue to reinforce education and continue to monitor.

## 2024-09-20 NOTE — THERAPY
"Occupational Therapy   Initial Evaluation     Patient Name: Kwesi Rivera  Age:  34 y.o., Sex:  male  Medical Record #: [id number]  Today's Date: 9/20/2024     Subjective    \"I'm not really sure how I got meningitis, it's been pretty crazy.\"      Objective       09/20/24 0701   OT Charge Group   Charges Yes   OT Self Care / ADL (Units) 1   OT Evaluation OT Evaluation Low   OT Total Time Spent   OT Individual Total Time Spent (Mins) 60   Prior Living Situation   Prior Services Home-Independent   Housing / Facility 2 Story House   Steps Into Home 0   Steps In Home 12  (1 FOS)   Elevator No   Bathroom Set up Walk In Shower   Equipment Owned None   Lives with - Patient's Self Care Capacity Spouse;Child Less than 18 Years of Age  (9 yo and 3 month old)   Prior Level of ADL Function   Self Feeding Independent   Grooming / Hygiene Independent   Bathing Independent   Dressing Independent   Toileting Independent   Prior Level of IADL Function   Medication Management Independent   Laundry Independent   Kitchen Mobility Independent   Finances Independent   Home Management Independent   Shopping Independent   Prior Level Of Mobility Independent Without Device in Community;Independent With Steps in Community;Independent Without Device in Home   Driving / Transportation Driving Independent   Occupation (Pre-Hospital Vocational) Employed Full Time  (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, works out of [employer])   Leisure Interests Other (Comments)  (skiing, mountain biking)   Prior Functioning: Everyday Activities   Self Care Independent   Indoor Mobility (Ambulation) Independent   Stairs Independent   Functional Cognition Independent   Prior Device Use None of the given options   Vitals   O2 Delivery Device None - Room Air   Pain   Intervention Distraction   Pain 0 - 10 Group   Location Head   Location Orientation Posterior   Pain Rating Scale (NPRS) 1   Cognition    Orientation Level Oriented x 4   Level of Consciousness Alert   Ability To Follow " "Commands 3 Step   Cognitive Pattern Assessment   Cognitive Pattern Assessment Used BIMS   Brief Interview for Mental Status (BIMS)   Repetition of Three Words (First Attempt) 3   Temporal Orientation: Year Correct   Temporal Orientation: Month Accurate within 5 days   Temporal Orientation: Day Correct   Recall: \"Sock\" Yes, no cue required   Recall: \"Blue\" Yes, no cue required   Recall: \"Bed\" Yes, no cue required   BIMS Summary Score 15   Confusion Assessment Method (CAM)   Is there evidence of an acute change in mental status from the patient's baseline? No   Inattention Behavior not present   Disorganized thinking Behavior not present   Altered level of consciousness Behavior not present   Vision Screen   Vision Not tested  (no reports of double vision/blurry vision)   Passive ROM Upper Body   Passive ROM Upper Body WDL   Active ROM Upper Body   Active ROM Upper Body  WDL   Dominant Hand Right   Strength Upper Body   Upper Body Strength  WDL   Sensation Upper Body   Upper Extremity Sensation  WDL   Upper Body Muscle Tone   Upper Body Muscle Tone  WDL   Balance Assessment   Sitting Balance (Static) Normal   Sitting Balance (Dynamic) Good   Standing Balance (Static) Normal   Standing Balance (Dynamic) Good   Weight Shift Sitting Good   Weight Shift Standing Good   Bed Mobility    Supine to Sit Independent   Sit to Supine Independent   Sit to Stand Independent   Scooting Independent   Rolling Independent   Coordination Upper Body   Coordination WDL   Hearing, Speech, and Vision   Ability to Hear Adequate   Ability to See in Adequate Light Adequate   Expression of Ideas and Wants Without difficulty   Understanding Verbal and Non-Verbal Content Understands   Functional Level of Assist   Eating Independent   Grooming Independent   Bathing Modified Independent   Upper Body Dressing Independent   Lower Body Dressing Independent   Toileting Independent   Bed, Chair, Wheelchair Transfer Independent   Toilet Transfers " Independent   Tub / Shower Transfers Independent   Eating   Assistance Needed Independent   CARE Score - Eating 6   Eating Discharge Goal   Discharge Goal 6   Oral Hygiene   Assistance Needed Independent   CARE Score - Oral Hygiene 6   Oral Hygiene Discharge Goal   Discharge Goal 6   Shower/Bathe Self   Assistance Needed Independent   CARE Score - Shower/Bathe Self 6   Shower/Bathe Self Discharge Goal   Discharge Goal 6   Upper Body Dressing   Assistance Needed Independent   CARE Score - Upper Body Dressing 6   Upper Body Dressing Discharge Goal   Discharge Goal 6   Lower Body Dressing   Assistance Needed Independent   CARE Score - Lower Body Dressing 6   Lower Body Dressing Discharge Goal   Discharge Goal 6   Putting On/Taking Off Footwear   Assistance Needed Independent   CARE Score - Putting On/Taking Off Footwear 6   Putting On/Taking Off Footwear Discharge Goal   Discharge Goal 6   Toileting Hygiene   Assistance Needed Independent   CARE Score - Toileting Hygiene 6   Toileting Hygiene Discharge Goal   Discharge Goal 6   Toilet Transfer   Assistance Needed Independent   CARE Score - Toilet Transfer 6   Toilet Transfer Discharge Goal   Discharge Goal 6   Problem List   Problem List None   Current Discharge Plan   Current Discharge Plan Return to Prior Living Situation   Benefit    Therapy Benefit Patient Would Benefit from Inpatient Rehab Occupational Therapy to Maximize [place] with ADLs, IADLs and Functional Mobility.   Interdisciplinary Plan of Care Collaboration   Patient Position at End of Therapy Edge of Bed;Call Light within Reach;Phone within Reach;Tray Table within Reach   Strengths & Barriers   Strengths Able to follow instructions;Adequate strength;Alert and oriented;Effective communication skills;Good balance;Good endurance;Good insight into deficits/needs;Independent prior level of function;Making steady progress towards goals;Manages pain appropriately;Motivated for self care and  "independence;Pleasant and cooperative;Supportive family;Willingly participates in therapeutic activities;Good carryover of learning       Assessment  Patient is 34 y.o. male with a diagnosis of meningitis.  No known PMHx;  who presented on 9/12/2024  5:50 PM with AMS after having a HA. Per documentation, patient had a headachge on 9/12, he laid down to take a nap and woke up with ASM. Upon eval at [facility] ED, patient was non verbal and had GCS 9. CT head and CTA head and neck had no obvious abnormalities but showed diffuse edema in the b/l hemispheres. Patient was transferred to [facility] for higher level of care, patient was intubated for AMS.  neurology was consulted, and patient had LP performed that showed \"milky\" CSF, with elevated WBC and protein in CSF. MRI brain obtained showed  hyperintense signal within the subarachnoid spaces concerning for meningitis. MRI L spine showed fluid in the T2 thecal sc, consistent with meningitis. MRV head negative for venous thrombosis.  PCR panel + for strep pneumonia. Blood cultures with NGTD.  ID consulted, patient was sarted on vanco and ceftriaxone, which was transitioned to  Penicillin G x 14 days, stop date is 9/26. Patient was extubated on 9/14.      Pt lives in 2 , with wife and 2 small children. Pt was independent with all ADL's/IADL's and mobility without use of AE/AD. Pt works full time in [employer] [place] [employer] [place] and drives there from [place]. Pt currently presents [facility] for all ADL's and mobility. Pt reported having double vision/blurry vision and difficulty holding objects x 4 days ago, but has since cleared up. Pt with no known deficits at this time limiting his functional performance. Pt was able to perform all ADL's at independent level during evaluation. Pt would benefit from performing IADL tasks, and/or pre-driving assessments prior to d/c home in order to safely return to home setting with support of wife and mother (currently " in town to assist as needed).     Plan  Recommend Occupational Therapy  minutes per day 5-7 days per week for a very short stay for the following treatments:  OT Self Care/ADL, OT Neuro Re-Ed/Balance, OT Therapeutic Activity, OT Evaluation, and OT Therapeutic Exercise.    Passport items to be completed:  Perform bathroom transfers, complete dressing, complete feeding, get ready for the day, prepare a simple meal, participate in household tasks, adapt home for safety needs, demonstrate home exercise program, complete caregiver training     Goals:  Long term and short term goals have been discussed with patient and they are in agreement.    Occupational Therapy Goals (Active)       Problem: OT Long Term Goals       Dates: Start:  09/20/24         Goal: LTG-By discharge, patient will complete basic home management with mod I       Dates: Start:  09/20/24

## 2024-09-20 NOTE — THERAPY
"Speech Language Pathology   Initial Assessment     Patient Name: Kwesi Rivera  AGE:  34 y.o., SEX:  male  Medical Record #: [id number]  Today's Date: 9/20/2024     Subjective    Pt pleasant and cooperative, family present and supportive.     Objective       09/20/24 1003   Evaluation Charges   Charges Yes   SLP Speech Language Evaluation Speech Sound Language Comprehension   SLP Total Time Spent   SLP Individual Total Time Spent (Mins) 60   Prior Level Of Function   Communication Within Functional Limits   Hearing Within Functional Limits for Evaluation   Vision Within Functional Limits for Evaluation   Education Some College or Trade School   Occupation (Pre-Hospital Vocational) Employed Full Time   Cognitive Pattern Assessment   Cognitive Pattern Assessment Used BIMS   Brief Interview for Mental Status (BIMS)   Repetition of Three Words (First Attempt) 3   Temporal Orientation: Year Correct   Temporal Orientation: Month Accurate within 5 days   Temporal Orientation: Day Correct   Recall: \"Sock\" Yes, no cue required   Recall: \"Blue\" Yes, no cue required   Recall: \"Bed\" Yes, no cue required   BIMS Summary Score 15   Confusion Assessment Method (CAM)   Is there evidence of an acute change in mental status from the patient's baseline? No   Inattention Behavior not present   Disorganized thinking Behavior not present   Altered level of consciousness Behavior not present   Functional Level of Assist   Comprehension Independent   Expression Independent   Social Interaction Independent   Problem Solving Independent   Memory Independent   Outcome Measures   Outcome Measures Utilized SCCAN   SCCAN (Scales of Cognitive and Communicative Ability for Neurorehabilitation)   Oral Expression - Raw Score 19   Oral Expression - Scale Performance Score 100   Orientation - Raw Score 12   Orientation - Scale Performance Score 100   Memory - Raw Score 18   Memory - Scale Performance Score 95   Speech Comprehension - Raw Score 13   Speech " Comprehension - Scale Performance Score 100   Reading Comprehension - Raw Score 12   Reading Comprehension - Scale Performance Score 100   Writing - Raw Score 7   Writing - Scale Performance Score 100   Attention - Raw Score 15   Attention - Scale Performance Score 94   Problem Solving - Raw Score 21   Problem Solving - Scale Performance Score 91   SCCAN Total Raw Score 92   SCCAN Degree of Severity Typical Functioning   Current Discharge Plan   Current Discharge Plan Return to Prior Living Situation   Benefit   Therapy Benefit Patient would not benefit from Inpatient Rehab Speech-Language Pathology.  No further Speech Therapy recommended at this time.   Interdisciplinary Plan of Care Collaboration   IDT Collaboration with  Nursing;Physician;Family / Caregiver   Patient Position at End of Therapy Seated;In Bed;Call Light within Reach;Tray Table within Reach;Phone within Reach;Family / Friend in Room   Collaboration Comments cognition WFL, SLP eval only.   Speech Language Pathologist Assigned   Assigned SLP / Treatment Time / Comments NO ST         Assessment    Patient is 34 y.o. male with a diagnosis of meningitis.  Additional factors influencing patient status/progress (ie: cognitive factors, co-morbidities, social support, etc): pt declines any noted cognitive changes. Indep PLOF working full time. Pt assessed with SCCAN pt achieved a raw score of 92/94 indicating cognition is WFL. Pt completed additional complex deductive reasoning puzzle indep. No further cognitive intervention is rec nor warranted, pt family and MD aware of POC and in agreement at this time..      Plan  Recommend Speech Therapy 30-60 minutes per day  1  days per week for 1 days for the following treatments: cognitive evaluation only.    Speech Therapy Problems (Active)       There are no active problems.

## 2024-09-20 NOTE — PROGRESS NOTES
"  Physical Medicine & Rehabilitation Progress Note    Encounter Date: 9/20/2024    Chief Complaint: Decreased mobility, weakness    Interval Events (Subjective):  Patient sitting up in room. He reports he is doing well. Denies pain. Denies NVD. He reports dizziness is resolving    Objective:  VITAL SIGNS: /66   Pulse 80   Temp 36.9 °C (98.4 °F) (Oral)   Resp 18   Ht 1.676 m (5' 6\")   Wt 79.2 kg (174 lb 8 oz)   SpO2 91%   BMI 28.17 kg/m²   Gen: NAD  Psych: Mood and affect appropriate  CV: RRR, 0 edema  Resp: CTAB, no upper airway sounds  Abd: NTND  Neuro: AOx4, following commands    Laboratory Values:  Recent Results (from the past 72 hour(s))   CBC with Differential    Collection Time: 09/20/24  5:54 AM   Result Value Ref Range    WBC 6.0 4.8 - 10.8 K/uL    RBC 4.99 4.70 - 6.10 M/uL    Hemoglobin 14.9 14.0 - 18.0 g/dL    Hematocrit 43.0 42.0 - 52.0 %    MCV 86.2 81.4 - 97.8 fL    MCH 29.9 27.0 - 33.0 pg    MCHC 34.7 32.3 - 36.5 g/dL    RDW 38.1 35.9 - 50.0 fL    Platelet Count 222 164 - 446 K/uL    MPV 9.3 9.0 - 12.9 fL    Neutrophils-Polys 50.50 44.00 - 72.00 %    Lymphocytes 38.90 22.00 - 41.00 %    Monocytes 7.10 0.00 - 13.40 %    Eosinophils 3.50 0.00 - 6.90 %    Basophils 0.00 0.00 - 1.80 %    Nucleated RBC 0.00 0.00 - 0.20 /100 WBC    Neutrophils (Absolute) 3.03 1.82 - 7.42 K/uL    Lymphs (Absolute) 2.33 1.00 - 4.80 K/uL    Monos (Absolute) 0.43 0.00 - 0.85 K/uL    Eos (Absolute) 0.21 0.00 - 0.51 K/uL    Baso (Absolute) 0.00 0.00 - 0.12 K/uL    NRBC (Absolute) 0.00 K/uL    Microcytosis 1+    Comp Metabolic Panel (CMP)    Collection Time: 09/20/24  5:54 AM   Result Value Ref Range    Sodium 136 135 - 145 mmol/L    Potassium 4.2 3.6 - 5.5 mmol/L    Chloride 106 96 - 112 mmol/L    Co2 18 (L) 20 - 33 mmol/L    Anion Gap 12.0 7.0 - 16.0    Glucose 99 65 - 99 mg/dL    Bun 18 8 - 22 mg/dL    Creatinine 1.05 0.50 - 1.40 mg/dL    Calcium 8.8 8.5 - 10.5 mg/dL    Correct Calcium 9.4 8.5 - 10.5 mg/dL    " AST(SGOT) 41 12 - 45 U/L    ALT(SGPT) 99 (H) 2 - 50 U/L    Alkaline Phosphatase 41 30 - 99 U/L    Total Bilirubin 0.8 0.1 - 1.5 mg/dL    Albumin 3.2 3.2 - 4.9 g/dL    Total Protein 5.6 (L) 6.0 - 8.2 g/dL    Globulin 2.4 1.9 - 3.5 g/dL    A-G Ratio 1.3 g/dL   HEMOGLOBIN A1C    Collection Time: 09/20/24  5:54 AM   Result Value Ref Range    Glycohemoglobin 5.6 4.0 - 5.6 %    Est Avg Glucose 114 mg/dL   TSH with Reflex to FT4    Collection Time: 09/20/24  5:54 AM   Result Value Ref Range    TSH 3.630 0.380 - 5.330 uIU/mL   Vitamin D, 25-hydroxy (blood)    Collection Time: 09/20/24  5:54 AM   Result Value Ref Range    25-Hydroxy   Vitamin D 25 25 (L) 30 - 100 ng/mL   DIFFERENTIAL MANUAL    Collection Time: 09/20/24  5:54 AM   Result Value Ref Range    Manual Diff Status PERFORMED    PERIPHERAL SMEAR REVIEW    Collection Time: 09/20/24  5:54 AM   Result Value Ref Range    Peripheral Smear Review see below    PLATELET ESTIMATE    Collection Time: 09/20/24  5:54 AM   Result Value Ref Range    Plt Estimation Normal    MORPHOLOGY    Collection Time: 09/20/24  5:54 AM   Result Value Ref Range    RBC Morphology Present    ESTIMATED GFR    Collection Time: 09/20/24  5:54 AM   Result Value Ref Range    GFR (CKD-EPI) 95 >60 mL/min/1.73 m 2       Medications:  Scheduled Medications   Medication Dose Frequency    Pharmacy Consult Request  1 Each PHARMACY TO DOSE    senna-docusate  2 Tablet BID    omeprazole  20 mg DAILY    enoxaparin (LOVENOX) injection  40 mg DAILY AT 1800    penicillin g  4 Million Units Q4HRS     PRN medications: hydrALAZINE, acetaminophen, senna-docusate **AND** polyethylene glycol/lytes, docusate sodium, magnesium hydroxide, carboxymethylcellulose, benzocaine-menthol, mag hydrox-al hydrox-simeth, ondansetron **OR** ondansetron, traZODone, sodium chloride, traMADol, midazolam, ibuprofen, melatonin    Diet:  Current Diet Order   Procedures    Diet Order Diet: Regular       Medical Decision Making and  Plan:  Streptococcal meningitis - Patient with sepsis and meningitis with strep with ID consultation recommending 14 days Penicillin. End date is 9/27/24  -PT and OT for mobility and ADLs. Per guidelines, 15 hours per week between PT, OT and/or SLP.  -Follow-up Neurology. Continue Penicillin until Friday. Check weekly labs 9/23     S1 L nerve root compression - Found on MRI of L spine, will need outpatient follow-up     Anemia - Check AM CBC - 14.9, improved, will recheck next week      Hyperglycemia - Check AM CMP, was on steroids. A1c 5.6, most likely steroids.      Azotemia - Check AM CMP - 18, improved from 23     Elevated LFTs - elevated on admission, will recheck next week.     Pain - Patient on PRN Tylenol, Ibuprofen and Tramadol     Vitamin D insufficiency - 25 on admission, start 1000 U     Skin - Patient at risk for skin breakdown due to debility in areas including sacrum, achilles, elbows and head in addition to other sites. Nursing to assess skin daily.      GI Ppx - Patient on Prilosec for GERD prophylaxis. Patient on Senna-docusate for constipation prophylaxis.      DVT Ppx - Patient Lovenox on transfer.  ____________________________________    T. Manish Sousa MD/PhD  [facility] - Physical Medicine & Rehabilitation   [facility] - [facility] Medicine   ____________________________________    Total time:  50 minutes. Time spent included pre-rounding review of vitals and tests, unit/floor time, face-to-face time with the patient including physical examination, care coordination, counseling of patient and/or family, ordering medications/procedures/tests, discussion with CM, PT, OT, SLP and/or other healthcare providers, and documentation in the electronic medical record. Topics discussed included admission labs, new elevated LFTs, continue pain medications, recheck labs, and continue Penicillin.

## 2024-09-21 ENCOUNTER — APPOINTMENT (OUTPATIENT)
Dept: OCCUPATIONAL THERAPY | Facility: REHABILITATION | Age: 34
DRG: 095 | End: 2024-09-21
Attending: PHYSICAL MEDICINE & REHABILITATION
Payer: OTHER GOVERNMENT

## 2024-09-21 ENCOUNTER — APPOINTMENT (OUTPATIENT)
Dept: PHYSICAL THERAPY | Facility: REHABILITATION | Age: 34
DRG: 095 | End: 2024-09-21
Attending: PHYSICAL MEDICINE & REHABILITATION
Payer: OTHER GOVERNMENT

## 2024-09-21 PROCEDURE — 97110 THERAPEUTIC EXERCISES: CPT

## 2024-09-21 PROCEDURE — 97116 GAIT TRAINING THERAPY: CPT

## 2024-09-21 PROCEDURE — 97535 SELF CARE MNGMENT TRAINING: CPT

## 2024-09-21 PROCEDURE — 700105 HCHG RX REV CODE 258: Performed by: PHYSICAL MEDICINE & REHABILITATION

## 2024-09-21 PROCEDURE — A9270 NON-COVERED ITEM OR SERVICE: HCPCS | Performed by: PHYSICAL MEDICINE & REHABILITATION

## 2024-09-21 PROCEDURE — 700111 HCHG RX REV CODE 636 W/ 250 OVERRIDE (IP): Mod: JZ | Performed by: PHYSICAL MEDICINE & REHABILITATION

## 2024-09-21 PROCEDURE — 97530 THERAPEUTIC ACTIVITIES: CPT

## 2024-09-21 PROCEDURE — 97112 NEUROMUSCULAR REEDUCATION: CPT

## 2024-09-21 PROCEDURE — 770010 HCHG ROOM/CARE - REHAB SEMI PRIVAT*

## 2024-09-21 PROCEDURE — 700102 HCHG RX REV CODE 250 W/ 637 OVERRIDE(OP): Performed by: PHYSICAL MEDICINE & REHABILITATION

## 2024-09-21 RX ADMIN — SODIUM CHLORIDE 4 MILLION UNITS: 9 INJECTION, SOLUTION INTRAVENOUS at 14:27

## 2024-09-21 RX ADMIN — SODIUM CHLORIDE 4 MILLION UNITS: 9 INJECTION, SOLUTION INTRAVENOUS at 06:07

## 2024-09-21 RX ADMIN — SODIUM CHLORIDE 4 MILLION UNITS: 9 INJECTION, SOLUTION INTRAVENOUS at 21:47

## 2024-09-21 RX ADMIN — SENNOSIDES AND DOCUSATE SODIUM 2 TABLET: 50; 8.6 TABLET ORAL at 09:21

## 2024-09-21 RX ADMIN — OMEPRAZOLE 20 MG: 20 CAPSULE, DELAYED RELEASE ORAL at 09:20

## 2024-09-21 RX ADMIN — ENOXAPARIN SODIUM 40 MG: 100 INJECTION SUBCUTANEOUS at 17:49

## 2024-09-21 RX ADMIN — SODIUM CHLORIDE 4 MILLION UNITS: 9 INJECTION, SOLUTION INTRAVENOUS at 17:49

## 2024-09-21 RX ADMIN — SODIUM CHLORIDE 4 MILLION UNITS: 9 INJECTION, SOLUTION INTRAVENOUS at 02:05

## 2024-09-21 RX ADMIN — Medication 1000 UNITS: at 09:20

## 2024-09-21 RX ADMIN — Medication 10.5 MG: at 20:06

## 2024-09-21 RX ADMIN — IBUPROFEN 400 MG: 400 TABLET, FILM COATED ORAL at 09:19

## 2024-09-21 RX ADMIN — SODIUM CHLORIDE 4 MILLION UNITS: 9 INJECTION, SOLUTION INTRAVENOUS at 10:48

## 2024-09-21 ASSESSMENT — GAIT ASSESSMENTS
DISTANCE (FEET): 300
DEVIATION: NO DEVIATION
GAIT LEVEL OF ASSIST: SUPERVISED

## 2024-09-21 ASSESSMENT — PAIN DESCRIPTION - PAIN TYPE: TYPE: ACUTE PAIN

## 2024-09-21 NOTE — CARE PLAN
The patient is Stable - Low risk of patient condition declining or worsening    Shift Goals  Clinical Goals: Safety  Patient Goals: Safety    Progress made toward(s) clinical / shift goals:      Problem: Bowel Elimination  Goal: Patient will participate in bowel management program  Outcome: Progressing  Note: Pt refused his scheduled senna tabs tonight. LBM 9/20/24.     Problem: Pain - Standard  Goal: Alleviation of pain or a reduction in pain to the patient’s comfort goal  Outcome: Progressing  Note: Pt denies pain or discomfort tonight. Sleeps ok. Will continue to monitor.       Patient is not progressing towards the following goals:

## 2024-09-21 NOTE — THERAPY
"Occupational Therapy  Daily Treatment     Patient Name: Kwesi Rivera  Age:  34 y.o., Sex:  male  Medical Record #: [id number]  Today's Date: 9/21/2024     Precautions  Comments: high functional. May shower with family member present          Subjective    \"I want to stand so that I can work on my leg strength\"     Objective       09/21/24 1031 09/21/24 1331   OT Charge Group   OT Self Care / ADL (Units)  --  2   OT Therapy Activity (Units)  --  1   OT Therapeutic Exercise (Units) 4 1   OT Total Time Spent   OT Individual Total Time Spent (Mins) 60 60   Functional Level of Assist   Bathing  --  Independent   Upper Body Dressing  --  Independent   Lower Body Dressing  --  Independent   Tub / Shower Transfers  --  Independent   Sitting Upper Body Exercises   Sitting Upper Body Exercises Yes  --    Lat Pull 3 sets of 15;Bilateral  (45lb equalizer)  --    Bicep Curls 3 sets of 15;Right ;Left  (20lb equalizer)  --    Standing Upper Body Exercises   Standing Upper Body Exercises Yes  --    Comments fluido bike level 5 in standing for 15 mins  --    Sitting Lower Body Exercises   Comments  --  motomed level 13 for 20 mins on LLEs, no rest breaks, to build endurance   Balance   Comments  --  Pt walked around outside uneven surfaces with no AE independently   Interdisciplinary Plan of Care Collaboration   IDT Collaboration with   --  Family / Caregiver   Collaboration Comments MANE Lynne gave pt IV and cleared him to do UE exercises with IV in discussed with pt and wife that he may take a shower with a family member present if they alert the RN first     During AM session, pt pushed grocery cart with ~60lb of weights around halls and therapy gym to build endurance.    Assessment    Pt seen for OT tx sessions twice this day, focusing on strengthening and endurance activities. Pt would like to return to the gym as soon as possible. Anxious to go home but understands the need to stay in rehab for a few more days. Pt likely will not " need OT services at discharge.   Strengths: Able to follow instructions, Adequate strength, Alert and oriented, Effective communication skills, Good balance, Good endurance, Good insight into deficits/needs, Independent prior level of function, Making steady progress towards goals, Manages pain appropriately, Motivated for self care and independence, Pleasant and cooperative, Supportive family, Willingly participates in therapeutic activities, Good carryover of learning    Plan    Strengthening, endurance, higher level balance, IADL    DME       Passport items to be completed:  Perform bathroom transfers, complete dressing, complete feeding, get ready for the day, prepare a simple meal, participate in household tasks, adapt home for safety needs, demonstrate home exercise program, complete caregiver training     Occupational Therapy Goals (Active)       Problem: OT Long Term Goals       Dates: Start:  09/20/24         Goal: LTG-By discharge, patient will complete basic home management with mod I       Dates: Start:  09/20/24

## 2024-09-21 NOTE — THERAPY
Physical Therapy   Daily Treatment     Patient Name: Kwesi Rivera  Age:  34 y.o., Sex:  male  Medical Record #: [id number]  Today's Date: 9/21/2024     Precautions  Comments: high functional    Subjective    Patient in room supine in bed with family present upon entry and agreeable to therapy session.     Objective       09/21/24 0931   PT Charge Group   PT Gait Training (Units) 2   PT Neuromuscular Re-Education / Balance (Units) 1   PT Therapeutic Activities (Units) 1   PT Total Time Spent   PT Individual Total Time Spent (Mins) 60   Vitals   Blood Pressure 127/80   Pulse Oximetry 94 %   O2 Delivery Device None - Room Air   Cognition    Cognition / Consciousness WDL   ABS (Agitated Behavior Scale)   Agitated Behavior Scale Performed No   Gait Functional Level of Assist    Gait Level Of Assist Supervised   Assistive Device None   Distance (Feet) 300   # of Times Distance was Traveled 2   Deviation No deviation   Sitting Lower Body Exercises   Sitting Lower Body Exercises Yes   Nustep Resistance Level 6;Time (See Comments)   Comments performed for 12 mins   Standing Lower Body Exercises   Standing Lower Body Exercises Yes   Other Exercises Squats and side lunges with 6lb weight, side stepping with mini squats using green resistance band. 10 reps, 2 sets bilaterally.   Neuro-Muscular Treatments   Neuro-Muscular Treatments Verbal Cuing;Sequencing   Comments Performed SLS 10 reps, 2 sets belkys, with rebounder, ball toss and ring toss using no AD, slight swaying but no LOB   Interdisciplinary Plan of Care Collaboration   Patient Position at End of Therapy   (Pt remained standing after therapy session and passed off to OT.)   Physical Therapist Assigned   Assigned PT / Treatment Time / Comments Nimat/60         Assessment    Pt able to ambulate long distance and able to perform dynamic balance activities safely with use of no AD and  no LOB during therapy session.   Strengths: Able to follow instructions, Adequate strength,  Independent prior level of function, Pleasant and cooperative, Supportive family, Alert and oriented, Good balance    Plan    Recommend Physical Therapy 30-60 minutes per day 5-7 days per week for 4-5  days for the following treatments:  PT Gait Training, PT Therapeutic Exercises, PT Neuro Re-Ed/Balance, PT Therapeutic Activity, and PT Evaluation.     DME       Passport items to be completed:  Get in/out of bed safely, in/out of a vehicle, safely use mobility device, walk or wheel around home/community, navigate up and down stairs, show how to get up/down from the ground, ensure home is accessible, demonstrate HEP, complete caregiver training    Physical Therapy Problems (Active)       Problem: Mobility       Dates: Start:  09/20/24         Goal: STG-Within one week, patient will ambulate community distances in different surfaces, independent x 1500'       Dates: Start:  09/20/24               Problem: PT-Long Term Goals       Dates: Start:  09/20/24         Goal: LTG- Patient will be able to participate in community outing and reintegration.       Dates: Start:  09/20/24

## 2024-09-21 NOTE — PROGRESS NOTES
NURSING DAILY NOTE    Name: Kwesi Rivera   Date of Admission: 9/19/2024   Admitting Diagnosis: Streptococcus pneumoniae meningitis  Attending Physician: BELGICA BAIRD M.D.  Allergies: Patient has no known allergies.    Safety  Patient Assist     Patient Precautions     Precaution Comments  high functional  Bed Transfer Status  Independent  Toilet Transfer Status   Independent  Assistive Devices  None  Oxygen  None - Room Air  Diet/Therapeutic Dining  Current Diet Order   Procedures    Diet Order Diet: Regular     Pill Administration  whole  Agitated Behavioral Scale     ABS Level of Severity       Fall Risk  Has the patient had a fall this admission?   No  Lola Murri[place] Fall Risk Scoring  4, NO RISK  Fall Risk Safety Measures  low vision/ hearing    Vitals  Temperature: 36.8 °C (98.2 °F)  Temp src: Oral  Pulse: 80  Respiration: 18  Blood Pressure: 115/70  Blood Pressure MAP (Calculated): 85 MM HG  BP Location: Left, Upper Arm  Patient BP Position: Sitting     Oxygen  Pulse Oximetry: 97 %  O2 Delivery Device: None - Room Air    Bowel and Bladder  Last Bowel Movement  09/20/24 (per pt)  Stool Type  Type 4: Like a sausage or snake, smooth and soft  Bowel Device     Continent  Bladder: Continent void   Bowel: Continent movement  Bladder Function  Number of Times Voided: 1  Urine Color: Unable To Evaluate  Genitourinary Assessment   Bladder Assessment (WDL):  Within Defined Limits  Urine Color: Unable To Evaluate  Bladder Device: Bathroom  Bladder Scan: Post Void  $ Bladder Scan Results (mL): 24    Skin  Kenneth Score   20  Sensory Interventions   Skin Preventative Measures: Pillows in Use to Float Heels, Pillows in Use for Support / Positioning  Moisture Interventions         Pain  Pain Rating Scale  0 - No Pain  Pain Location  Head  Pain Location Orientation  Posterior  Pain Interventions   Declines, Rest    ADLs    Bathing   Shower, * * With Assistance  from, Staff (w/ OT)  Linen Change      Personal Hygiene     Chlorhexidine Bath      Oral Care     Teeth/Dentures     Shave     Nutrition Percentage Eaten  *  * Meal *  *, Dinner, Between % Consumed  Environmental Precautions     Patient Turns/Positioning  Patient Turns Self from Side to Side  Patient Turns Assistance/Tolerance     Bed Positions  Bed Controls On, Bed Locked  Head of Bed Elevated         Psychosocial/Neurologic Assessment  Psychosocial Assessment  Psychosocial (WDL):  Within Defined Limits  Neurologic Assessment  Neuro (WDL): Exceptions to WDL  Level of Consciousness: Alert  Orientation Level: Oriented X4  Cognition: Appropriate judgement  Speech: Clear  Pupil Assesment: No  EENT (WDL):  Within Defined Limits    Cardio/Pulmonary Assessment  Edema      Respiratory Breath Sounds  RUL Breath Sounds: Clear  RML Breath Sounds: Clear  RLL Breath Sounds: Clear  ROBE Breath Sounds: Clear  LLL Breath Sounds: Clear  Cardiac Assessment   Cardiac (WDL):  Within Defined Limits

## 2024-09-22 PROCEDURE — 700102 HCHG RX REV CODE 250 W/ 637 OVERRIDE(OP): Performed by: PHYSICAL MEDICINE & REHABILITATION

## 2024-09-22 PROCEDURE — 770010 HCHG ROOM/CARE - REHAB SEMI PRIVAT*

## 2024-09-22 PROCEDURE — 700105 HCHG RX REV CODE 258: Performed by: PHYSICAL MEDICINE & REHABILITATION

## 2024-09-22 PROCEDURE — A9270 NON-COVERED ITEM OR SERVICE: HCPCS | Performed by: PHYSICAL MEDICINE & REHABILITATION

## 2024-09-22 PROCEDURE — 700111 HCHG RX REV CODE 636 W/ 250 OVERRIDE (IP): Performed by: PHYSICAL MEDICINE & REHABILITATION

## 2024-09-22 RX ADMIN — SODIUM CHLORIDE 4 MILLION UNITS: 9 INJECTION, SOLUTION INTRAVENOUS at 10:04

## 2024-09-22 RX ADMIN — SODIUM CHLORIDE 4 MILLION UNITS: 9 INJECTION, SOLUTION INTRAVENOUS at 14:08

## 2024-09-22 RX ADMIN — OMEPRAZOLE 20 MG: 20 CAPSULE, DELAYED RELEASE ORAL at 08:38

## 2024-09-22 RX ADMIN — IBUPROFEN 400 MG: 400 TABLET, FILM COATED ORAL at 15:39

## 2024-09-22 RX ADMIN — ENOXAPARIN SODIUM 40 MG: 100 INJECTION SUBCUTANEOUS at 18:08

## 2024-09-22 RX ADMIN — SODIUM CHLORIDE 4 MILLION UNITS: 9 INJECTION, SOLUTION INTRAVENOUS at 06:12

## 2024-09-22 RX ADMIN — IBUPROFEN 400 MG: 400 TABLET, FILM COATED ORAL at 05:22

## 2024-09-22 RX ADMIN — SODIUM CHLORIDE 4 MILLION UNITS: 9 INJECTION, SOLUTION INTRAVENOUS at 18:07

## 2024-09-22 RX ADMIN — Medication 1000 UNITS: at 08:38

## 2024-09-22 RX ADMIN — SODIUM CHLORIDE 4 MILLION UNITS: 9 INJECTION, SOLUTION INTRAVENOUS at 21:41

## 2024-09-22 RX ADMIN — Medication 10.5 MG: at 19:57

## 2024-09-22 RX ADMIN — SODIUM CHLORIDE 4 MILLION UNITS: 9 INJECTION, SOLUTION INTRAVENOUS at 01:59

## 2024-09-22 NOTE — PROGRESS NOTES
NURSING DAILY NOTE    Name: Kwesi Rivera   Date of Admission: 9/19/2024   Admitting Diagnosis: Streptococcus pneumoniae meningitis  Attending Physician: BELGICA BAIRD M.D.  Allergies: Patient has no known allergies.    Safety  Patient Assist     Patient Precautions     Precaution Comments  high functional  Bed Transfer Status  Independent  Toilet Transfer Status   Independent  Assistive Devices  None  Oxygen  None - Room Air  Diet/Therapeutic Dining  Current Diet Order   Procedures    Diet Order Diet: Regular     Pill Administration  whole  Agitated Behavioral Scale     ABS Level of Severity       Fall Risk  Has the patient had a fall this admission?   No  Lola Murri[place] Fall Risk Scoring  5, NO RISK  Fall Risk Safety Measures  ok to leave pt in bathroom    Vitals  Temperature: 37.1 °C (98.7 °F)  Temp src: Oral  Pulse: 90  Respiration: 16  Blood Pressure: 121/80  Blood Pressure MAP (Calculated): 94 MM HG  BP Location: Left, Upper Arm  Patient BP Position: Supine     Oxygen  Pulse Oximetry: 97 %  O2 Delivery Device: None - Room Air    Bowel and Bladder  Last Bowel Movement  09/20/24  Stool Type  Type 4: Like a sausage or snake, smooth and soft  Bowel Device     Continent  Bladder: Continent void   Bowel: Continent movement  Bladder Function  Number of Times Voided: 1  Urine Color: Unable To Evaluate  Genitourinary Assessment   Bladder Assessment (WDL):  Within Defined Limits  Urine Color: Unable To Evaluate  Bladder Device: Bathroom  Bladder Scan: Post Void  $ Bladder Scan Results (mL): 24    Skin  Kenneth Score   22  Sensory Interventions   Skin Preventative Measures: Pillows in Use for Support / Positioning  Moisture Interventions         Pain  Pain Rating Scale  2 - Notice Pain, does not interfere with activities  Pain Location  Head  Pain Location Orientation  Posterior  Pain Interventions   Medication (see MAR), Rest (ibuprofin)    ADLs    Bathing  Recommended yearly dilated eye examinations.   Shower, * * With Assistance from, Staff (w/ OT)  Linen Change   Partial  Personal Hygiene     Chlorhexidine Bath      Oral Care     Teeth/Dentures     Shave     Nutrition Percentage Eaten  Lunch, Between % Consumed  Environmental Precautions     Patient Turns/Positioning  Patient Turns Self from Side to Side  Patient Turns Assistance/Tolerance     Bed Positions  Bed Controls On, Bed Locked  Head of Bed Elevated         Psychosocial/Neurologic Assessment  Psychosocial Assessment  Psychosocial (WDL):  Within Defined Limits  Neurologic Assessment  Neuro (WDL): Exceptions to WDL  Level of Consciousness: Alert  Orientation Level: Oriented X4  Cognition: Appropriate judgement  Speech: Clear  Pupil Assesment: No  EENT (WDL):  Within Defined Limits    Cardio/Pulmonary Assessment  Edema      Respiratory Breath Sounds  RUL Breath Sounds: Clear  RML Breath Sounds: Clear  RLL Breath Sounds: Clear  ROBE Breath Sounds: Clear  LLL Breath Sounds: Clear  Cardiac Assessment   Cardiac (WDL):  Within Defined Limits

## 2024-09-22 NOTE — CARE PLAN
The patient is Stable - Low risk of patient condition declining or worsening    Shift Goals  Clinical Goals: Safety  Patient Goals: Safety    Progress made toward(s) clinical / shift goals:      Problem: Bladder / Voiding  Goal: Patient will establish and maintain regular urinary output  Outcome: Progressing  Note: Pt continent of bladder this shift. Voiding adequately and ambulating to the BR. He denies dysuria.     Problem: Bowel Elimination  Goal: Patient will participate in bowel management program  Outcome: Progressing  Note: Pt continent of bowel. Refused his scheduled senna tabs tonight. LBM 9/21/24.       Patient is not progressing towards the following goals:

## 2024-09-22 NOTE — CARE PLAN
Problem: Knowledge Deficit - Standard  Goal: Patient and family/care givers will demonstrate understanding of plan of care, disease process/condition, diagnostic tests and medications  Outcome: Progressing   Patient verbalized understanding plan of care.       Problem: Pain - Standard  Goal: Alleviation of pain or a reduction in pain to the patient’s comfort goal  Outcome: Progressing   Patient is able to rate pain on a scale of 1-10.

## 2024-09-22 NOTE — PROGRESS NOTES
NURSING DAILY NOTE    Name: Kwesi Rivera   Date of Admission: 9/19/2024   Admitting Diagnosis: Streptococcus pneumoniae meningitis  Attending Physician: BELGICA BAIRD M.D.  Allergies: Patient has no known allergies.    Safety  Patient Assist     Patient Precautions     Precaution Comments  high functional  Bed Transfer Status  Independent  Toilet Transfer Status   Independent  Assistive Devices  None  Oxygen  None - Room Air  Diet/Therapeutic Dining  Current Diet Order   Procedures    Diet Order Diet: Regular     Pill Administration  whole  Agitated Behavioral Scale     ABS Level of Severity       Fall Risk  Has the patient had a fall this admission?   No  Lola Murri[place] Fall Risk Scoring  5, NO RISK  Fall Risk Safety Measures  bed alarm, chair alarm, poor balance, and low vision/ hearing    Vitals  Temperature: 37.1 °C (98.7 °F)  Temp src: Oral  Pulse: 90  Respiration: 16  Blood Pressure: 121/80  Blood Pressure MAP (Calculated): 94 MM HG  BP Location: Left, Upper Arm  Patient BP Position: Supine     Oxygen  Pulse Oximetry: 97 %  O2 Delivery Device: None - Room Air    Bowel and Bladder  Last Bowel Movement  09/21/24 (per pt)  Stool Type  Type 4: Like a sausage or snake, smooth and soft  Bowel Device     Continent  Bladder: Continent void   Bowel: Continent movement  Bladder Function  Number of Times Voided: 1  Urine Color: Unable To Evaluate  Genitourinary Assessment   Bladder Assessment (WDL):  Within Defined Limits  Urine Color: Unable To Evaluate  Bladder Device: Bathroom  Bladder Scan: Post Void  $ Bladder Scan Results (mL): 24    Skin  Kenneth Score   20  Sensory Interventions   Skin Preventative Measures: Pillows in Use for Support / Positioning  Moisture Interventions         Pain  Pain Rating Scale  0 - No Pain  Pain Location  Head  Pain Location Orientation  Posterior  Pain Interventions   Declines    ADLs    Bathing   Patient Refused  Bathing  Linen Change   Partial  Personal Hygiene  Perineal Care, Change Ynes Pads  Chlorhexidine Bath      Oral Care  Brushed Teeth  Teeth/Dentures     Shave     Nutrition Percentage Eaten  Lunch, Between % Consumed  Environmental Precautions     Patient Turns/Positioning  Patient Turns Self from Side to Side  Patient Turns Assistance/Tolerance     Bed Positions  Bed Controls On, Bed Locked  Head of Bed Elevated         Psychosocial/Neurologic Assessment  Psychosocial Assessment  Psychosocial (WDL):  Within Defined Limits  Neurologic Assessment  Neuro (WDL): Exceptions to WDL  Level of Consciousness: Alert  Orientation Level: Oriented X4  Cognition: Appropriate judgement  Speech: Clear  Pupil Assesment: No  EENT (WDL):  Within Defined Limits    Cardio/Pulmonary Assessment  Edema      Respiratory Breath Sounds  RUL Breath Sounds: Clear  RML Breath Sounds: Clear  RLL Breath Sounds: Clear  ROBE Breath Sounds: Clear  LLL Breath Sounds: Clear  Cardiac Assessment   Cardiac (WDL):  Within Defined Limits

## 2024-09-23 ENCOUNTER — APPOINTMENT (OUTPATIENT)
Dept: OCCUPATIONAL THERAPY | Facility: REHABILITATION | Age: 34
DRG: 095 | End: 2024-09-23
Attending: PHYSICAL MEDICINE & REHABILITATION
Payer: OTHER GOVERNMENT

## 2024-09-23 ENCOUNTER — APPOINTMENT (OUTPATIENT)
Dept: PHYSICAL THERAPY | Facility: REHABILITATION | Age: 34
DRG: 095 | End: 2024-09-23
Attending: PHYSICAL MEDICINE & REHABILITATION
Payer: OTHER GOVERNMENT

## 2024-09-23 ENCOUNTER — APPOINTMENT (OUTPATIENT)
Dept: INPATIENT REHAB | Facility: REHABILITATION | Age: 34
DRG: 095 | End: 2024-09-23
Attending: PHYSICAL MEDICINE & REHABILITATION
Payer: OTHER GOVERNMENT

## 2024-09-23 LAB
ALBUMIN SERPL BCP-MCNC: 3.5 G/DL (ref 3.2–4.9)
ALBUMIN/GLOB SERPL: 1.7 G/DL
ALP SERPL-CCNC: 45 U/L (ref 30–99)
ALT SERPL-CCNC: 90 U/L (ref 2–50)
ANION GAP SERPL CALC-SCNC: 11 MMOL/L (ref 7–16)
AST SERPL-CCNC: 22 U/L (ref 12–45)
BASOPHILS # BLD AUTO: 0.2 % (ref 0–1.8)
BASOPHILS # BLD: 0.01 K/UL (ref 0–0.12)
BILIRUB SERPL-MCNC: 0.4 MG/DL (ref 0.1–1.5)
BUN SERPL-MCNC: 15 MG/DL (ref 8–22)
CALCIUM ALBUM COR SERPL-MCNC: 8.6 MG/DL (ref 8.5–10.5)
CALCIUM SERPL-MCNC: 8.2 MG/DL (ref 8.5–10.5)
CHLORIDE SERPL-SCNC: 108 MMOL/L (ref 96–112)
CO2 SERPL-SCNC: 21 MMOL/L (ref 20–33)
CREAT SERPL-MCNC: 1.09 MG/DL (ref 0.5–1.4)
EOSINOPHIL # BLD AUTO: 0.12 K/UL (ref 0–0.51)
EOSINOPHIL NFR BLD: 2.3 % (ref 0–6.9)
ERYTHROCYTE [DISTWIDTH] IN BLOOD BY AUTOMATED COUNT: 40 FL (ref 35.9–50)
ERYTHROCYTE [SEDIMENTATION RATE] IN BLOOD BY WESTERGREN METHOD: 7 MM/HOUR (ref 0–20)
GFR SERPLBLD CREATININE-BSD FMLA CKD-EPI: 91 ML/MIN/1.73 M 2
GLOBULIN SER CALC-MCNC: 2.1 G/DL (ref 1.9–3.5)
GLUCOSE SERPL-MCNC: 95 MG/DL (ref 65–99)
HCT VFR BLD AUTO: 40.2 % (ref 42–52)
HGB BLD-MCNC: 13.8 G/DL (ref 14–18)
IMM GRANULOCYTES # BLD AUTO: 0.02 K/UL (ref 0–0.11)
IMM GRANULOCYTES NFR BLD AUTO: 0.4 % (ref 0–0.9)
LYMPHOCYTES # BLD AUTO: 1.32 K/UL (ref 1–4.8)
LYMPHOCYTES NFR BLD: 24.8 % (ref 22–41)
MAGNESIUM SERPL-MCNC: 2.2 MG/DL (ref 1.5–2.5)
MCH RBC QN AUTO: 30.1 PG (ref 27–33)
MCHC RBC AUTO-ENTMCNC: 34.3 G/DL (ref 32.3–36.5)
MCV RBC AUTO: 87.6 FL (ref 81.4–97.8)
MONOCYTES # BLD AUTO: 0.56 K/UL (ref 0–0.85)
MONOCYTES NFR BLD AUTO: 10.5 % (ref 0–13.4)
NEUTROPHILS # BLD AUTO: 3.3 K/UL (ref 1.82–7.42)
NEUTROPHILS NFR BLD: 61.8 % (ref 44–72)
NRBC # BLD AUTO: 0 K/UL
NRBC BLD-RTO: 0 /100 WBC (ref 0–0.2)
PLATELET # BLD AUTO: 204 K/UL (ref 164–446)
PMV BLD AUTO: 9 FL (ref 9–12.9)
POTASSIUM SERPL-SCNC: 4 MMOL/L (ref 3.6–5.5)
PROT SERPL-MCNC: 5.6 G/DL (ref 6–8.2)
RBC # BLD AUTO: 4.59 M/UL (ref 4.7–6.1)
SODIUM SERPL-SCNC: 140 MMOL/L (ref 135–145)
WBC # BLD AUTO: 5.3 K/UL (ref 4.8–10.8)

## 2024-09-23 PROCEDURE — 83735 ASSAY OF MAGNESIUM: CPT

## 2024-09-23 PROCEDURE — 80053 COMPREHEN METABOLIC PANEL: CPT

## 2024-09-23 PROCEDURE — 97530 THERAPEUTIC ACTIVITIES: CPT

## 2024-09-23 PROCEDURE — 97110 THERAPEUTIC EXERCISES: CPT

## 2024-09-23 PROCEDURE — 36415 COLL VENOUS BLD VENIPUNCTURE: CPT

## 2024-09-23 PROCEDURE — 700105 HCHG RX REV CODE 258: Performed by: PHYSICAL MEDICINE & REHABILITATION

## 2024-09-23 PROCEDURE — 99232 SBSQ HOSP IP/OBS MODERATE 35: CPT | Performed by: PHYSICAL MEDICINE & REHABILITATION

## 2024-09-23 PROCEDURE — 97535 SELF CARE MNGMENT TRAINING: CPT

## 2024-09-23 PROCEDURE — 97116 GAIT TRAINING THERAPY: CPT

## 2024-09-23 PROCEDURE — 700111 HCHG RX REV CODE 636 W/ 250 OVERRIDE (IP): Performed by: PHYSICAL MEDICINE & REHABILITATION

## 2024-09-23 PROCEDURE — 770010 HCHG ROOM/CARE - REHAB SEMI PRIVAT*

## 2024-09-23 PROCEDURE — 85652 RBC SED RATE AUTOMATED: CPT

## 2024-09-23 PROCEDURE — 97112 NEUROMUSCULAR REEDUCATION: CPT

## 2024-09-23 PROCEDURE — A9270 NON-COVERED ITEM OR SERVICE: HCPCS | Performed by: PHYSICAL MEDICINE & REHABILITATION

## 2024-09-23 PROCEDURE — 85025 COMPLETE CBC W/AUTO DIFF WBC: CPT

## 2024-09-23 PROCEDURE — 700102 HCHG RX REV CODE 250 W/ 637 OVERRIDE(OP): Performed by: PHYSICAL MEDICINE & REHABILITATION

## 2024-09-23 RX ORDER — OXYMETAZOLINE HYDROCHLORIDE 0.05 G/100ML
2 SPRAY NASAL 2 TIMES DAILY PRN
Status: DISCONTINUED | OUTPATIENT
Start: 2024-09-23 | End: 2024-09-25 | Stop reason: HOSPADM

## 2024-09-23 RX ADMIN — SODIUM CHLORIDE 4 MILLION UNITS: 9 INJECTION, SOLUTION INTRAVENOUS at 18:13

## 2024-09-23 RX ADMIN — SODIUM CHLORIDE 4 MILLION UNITS: 9 INJECTION, SOLUTION INTRAVENOUS at 21:58

## 2024-09-23 RX ADMIN — SODIUM CHLORIDE 4 MILLION UNITS: 9 INJECTION, SOLUTION INTRAVENOUS at 10:05

## 2024-09-23 RX ADMIN — IBUPROFEN 400 MG: 400 TABLET, FILM COATED ORAL at 20:13

## 2024-09-23 RX ADMIN — Medication 1000 UNITS: at 10:07

## 2024-09-23 RX ADMIN — OMEPRAZOLE 20 MG: 20 CAPSULE, DELAYED RELEASE ORAL at 10:07

## 2024-09-23 RX ADMIN — Medication 10.5 MG: at 20:13

## 2024-09-23 RX ADMIN — SODIUM CHLORIDE 4 MILLION UNITS: 9 INJECTION, SOLUTION INTRAVENOUS at 14:26

## 2024-09-23 RX ADMIN — IBUPROFEN 400 MG: 400 TABLET, FILM COATED ORAL at 07:13

## 2024-09-23 RX ADMIN — SODIUM CHLORIDE 4 MILLION UNITS: 9 INJECTION, SOLUTION INTRAVENOUS at 05:45

## 2024-09-23 RX ADMIN — SODIUM CHLORIDE 4 MILLION UNITS: 9 INJECTION, SOLUTION INTRAVENOUS at 01:33

## 2024-09-23 ASSESSMENT — GAIT ASSESSMENTS
DEVIATION: NO DEVIATION
GAIT LEVEL OF ASSIST: SUPERVISED
GAIT LEVEL OF ASSIST: SUPERVISED
DEVIATION: NO DEVIATION
ASSISTIVE DEVICE: NONE;OTHER (COMMENTS)

## 2024-09-23 ASSESSMENT — PAIN DESCRIPTION - PAIN TYPE: TYPE: ACUTE PAIN

## 2024-09-23 NOTE — THERAPY
Physical Therapy   Daily Treatment     Patient Name: Kwesi Rivera  Age:  34 y.o., Sex:  male  Medical Record #: [id number]  Today's Date: 9/23/2024     Precautions  Comments: high functional    Subjective    Pt is ready for therapy upon PT arrival.      Objective       09/23/24 0831   PT Charge Group   PT Gait Training (Units) 2   PT Therapeutic Exercise (Units) 2   PT Total Time Spent   PT Individual Total Time Spent (Mins) 60   Pain   Intervention Declines   Pain 0 - 10 Group   Pain Rating Scale (NPRS) 0   Gait Functional Level of Assist    Gait Level Of Assist Supervised   Assistive Device None;Other (Comments)  (farmers carry. carrying two 9lbs DB)   Distance (Feet)   (around the perimeter of rehab hosp)   # of Times Distance was Traveled 2   Deviation No deviation   Transfer Functional Level of Assist   Bed, Chair, Wheelchair Transfer Independent   Supine Lower Body Exercise   Other Exercises shuttle exercise consisting of leg press and calf raise, 6 bands, 3 sets of 10 each   Sitting Lower Body Exercises   Nustep Resistance Level 4;Resistance Level 5;Resistance Level 6;Resistance Level 7;Time (See Comments)  (interval training x 16 mins for B UE and LE with increasing resistance every 4 mins and cool down for 1 min L3.  Total steps 1014)   Other Exercises seated shouder press and chest press using 10lbs ball , 3 sets of 10   Interdisciplinary Plan of Care Collaboration   Patient Position at End of Therapy Seated;Call Light within Reach;Tray Table within Reach;Phone within Reach         Assessment    Pt performed interval training in Nu steps ex for B UE and LE with increasing resistance and 1 min cool down x 16 mins. Pt completed the exercise without fatigue. Instructed in farmers carry using 9lbs DB x 2 rounds outdoor in the hospital perimeters in different surfaces. No LOB noted and denied fatigue. Pt performed seated thera ex and and supine shuttle ex for LE with cueing for proper form and breathing  techniques. Instructed not to lock knees during leg press.     Strengths: Able to follow instructions, Adequate strength, Independent prior level of function, Pleasant and cooperative, Supportive family, Alert and oriented, Good balance    Plan    Strengthening, Activity Tolerance      DME       Passport items to be completed:  Get in/out of bed safely, in/out of a vehicle, safely use mobility device, walk or wheel around home/community, navigate up and down stairs, show how to get up/down from the ground, ensure home is accessible, demonstrate HEP, complete caregiver training    Physical Therapy Problems (Active)       Problem: Mobility       Dates: Start:  09/20/24         Goal: STG-Within one week, patient will ambulate community distances in different surfaces, independent x 1500'       Dates: Start:  09/20/24               Problem: PT-Long Term Goals       Dates: Start:  09/20/24         Goal: LTG- Patient will be able to participate in community outing and reintegration.       Dates: Start:  09/20/24

## 2024-09-23 NOTE — THERAPY
Occupational Therapy  Daily Treatment     Patient Name: Kwesi Rivera  Age:  34 y.o., Sex:  male  Medical Record #: [id number]  Today's Date: 9/23/2024     Precautions  Comments: high functional    Subjective    Pt EOB upon arrival, agreeable to participate in OT. RN disconnected midline.      Objective     09/23/24 0701   OT Charge Group   OT Self Care / ADL (Units) 2   OT Neuromuscular Re-education / Balance (Units) 2   OT Total Time Spent   OT Individual Total Time Spent (Mins) 60   Vitals   O2 Delivery Device None - Room Air   Sleep/Wake Cycle   Sleep & Rest Awake   Functional Level of Assist   Bed, Chair, Wheelchair Transfer Independent   IADL Treatments   IADL Treatments Meal preparation   Meal Preparation Independent for stove top meal prep task in standing without AD (including retrieval of items from refrigerator, drawers and cabinets)   Interdisciplinary Plan of Care Collaboration   Patient Position at End of Therapy Edge of Bed   Collaboration Comments Independent in room without AD     Pt actively participated in outdoor bean bag toss game while standing over ground, on airex and bosu ball (independent, no AD). No LOB noted.     Assessment    Pt w/ excellent activity tolerance this session and demo's ability to complete high level dynamic balance tasks @ independent level without AD. No overt LOB observed during this session and good safety awareness observed throughout stove top meal prep task.    Strengths: Able to follow instructions, Adequate strength, Alert and oriented, Effective communication skills, Good balance, Good endurance, Good insight into deficits/needs, Independent prior level of function, Making steady progress towards goals, Manages pain appropriately, Motivated for self care and independence, Pleasant and cooperative, Supportive family, Willingly participates in therapeutic activities, Good carryover of learning    Plan    F/u w/ patient and nursing tomorrow re: potential outing Wednesday  (antibiotic treatments @10am, 2pm,6pm)    Higher level balance, outdoor mobility without AD     DME    Passport items to be completed:  Perform bathroom transfers, complete dressing, complete feeding, get ready for the day, prepare a simple meal, participate in household tasks, adapt home for safety needs, demonstrate home exercise program, complete caregiver training     Occupational Therapy Goals (Active)       Problem: OT Long Term Goals       Dates: Start:  09/20/24         Goal: LTG-By discharge, patient will complete basic home management with mod I       Dates: Start:  09/20/24

## 2024-09-23 NOTE — THERAPY
"Occupational Therapy  Daily Treatment     Patient Name: Kwesi Rivera  Age:  34 y.o., Sex:  male  Medical Record #: [id number]  Today's Date: 9/23/2024     Precautions  Comments: high functional         Subjective    \"I think I'm supposed to get my antibiotic IV treatment at 2pm.\" Discussed with nursing and nursing planned to come back at 2:20pm 2/2 scheduling issues      Objective       09/23/24 1401   OT Charge Group   OT Therapy Activity (Units) 2   OT Total Time Spent   OT Individual Total Time Spent (Mins) 30   Cognition    Level of Consciousness Alert   Functional Level of Assist   Grooming Independent   Toileting Independent   Bed, Chair, Wheelchair Transfer Independent   Toilet Transfers Independent   Interdisciplinary Plan of Care Collaboration   Patient Position at End of Therapy In Bed;Call Light within Reach;Tray Table within Reach;Phone within Reach;Other (Comments)  (nursing present at end of session)         Assessment    Pt tolerated session well. Pt ambulated to/from therapy gym independently. Increased time taken to discuss pt's CLOF. Pt reported no issues with vision, FMC, sensation, hearing. Pt stating that he was getting \"tinnitus\" type sounds in his ear at nighttime- causing difficulty sleeping, but has since stopped and is able to sleep now. Pt has laptop in room, pt was able to complete banking and other higher level cognitive tasks with no issues. Discussed pt's plan for d/c. Pt will d/c on Friday to home x 1 week. And work from home. [place]- but will need to be cleared by medical team there prior to returning to full duties. Pt has elliptical at home that he can use to work on endurance . Issued pt thera-bands for UB strengthening.     Strengths: Able to follow instructions, Adequate strength, Alert and oriented, Effective communication skills, Good balance, Good endurance, Good insight into deficits/needs, Independent prior level of function, Making " steady progress towards goals, Manages pain appropriately, Motivated for self care and independence, Pleasant and cooperative, Supportive family, Willingly participates in therapeutic activities, Good carryover of learning    Plan    F/u w/ patient and nursing tomorrow re: potential outing Wednesday (antibiotic treatments @10am, 2pm,6pm)     Higher level balance, outdoor mobility without AD     Occupational Therapy Goals (Active)       There are no active problems.

## 2024-09-23 NOTE — PROGRESS NOTES
NURSING DAILY NOTE    Name: Kwesi Rivera   Date of Admission: 9/19/2024   Admitting Diagnosis: Streptococcus pneumoniae meningitis  Attending Physician: BELGICA BAIRD M.D.  Allergies: Patient has no known allergies.    Safety  Patient Assist     Patient Precautions     Precaution Comments  high functional  Bed Transfer Status  Independent  Toilet Transfer Status   Independent  Assistive Devices  None  Oxygen  None - Room Air  Diet/Therapeutic Dining  Current Diet Order   Procedures    Diet Order Diet: Regular     Pill Administration  whole  Agitated Behavioral Scale     ABS Level of Severity       Fall Risk  Has the patient had a fall this admission?   No  Lola [place]ri[place] Fall Risk Scoring  5, NO RISK  Fall Risk Safety Measures  bed alarm and ok to leave pt in bathroom    Vitals  Temperature: 36.4 °C (97.5 °F)  Temp src: Oral  Pulse: 60  Respiration: 18  Blood Pressure: 94/46 (RN notified)  Blood Pressure MAP (Calculated): 62 MM HG  BP Location: Left, Upper Arm  Patient BP Position: Lying Right Side     Oxygen  Pulse Oximetry: 94 %  O2 Delivery Device: None - Room Air    Bowel and Bladder  Last Bowel Movement  09/21/24  Stool Type  Type 4: Like a sausage or snake, smooth and soft  Bowel Device     Continent  Bladder: Continent void   Bowel: Continent movement  Bladder Function  Number of Times Voided: 1  Urine Color: Unable To Evaluate  Genitourinary Assessment   Bladder Assessment (WDL):  Within Defined Limits  Urine Color: Unable To Evaluate  Bladder Device: Bathroom  Bladder Scan: Post Void  $ Bladder Scan Results (mL): 24    Skin  Kenneth Score   20  Sensory Interventions   Skin Preventative Measures: Pillows in Use for Support / Positioning  Moisture Interventions         Pain  Pain Rating Scale  0 - No Pain  Pain Location  Head  Pain Location Orientation  Posterior  Pain Interventions   Declines    ADLs    Bathing   Patient Refused Bathing  Linen  Change   Partial  Personal Hygiene  Perineal Care, Change Ynes Pads  Chlorhexidine Bath      Oral Care  Brushed Teeth  Teeth/Dentures     Shave     Nutrition Percentage Eaten  Dinner, Between 25-50% Consumed  Environmental Precautions     Patient Turns/Positioning  Patient Turns Self from Side to Side  Patient Turns Assistance/Tolerance     Bed Positions  Bed Controls On  Head of Bed Elevated         Psychosocial/Neurologic Assessment  Psychosocial Assessment  Psychosocial (WDL):  Within Defined Limits  Neurologic Assessment  Neuro (WDL): Exceptions to WDL  Level of Consciousness: Alert  Orientation Level: Oriented X4  Cognition: Appropriate judgement  Speech: Clear  Pupil Assesment: No  EENT (WDL):  Within Defined Limits    Cardio/Pulmonary Assessment  Edema      Respiratory Breath Sounds  RUL Breath Sounds: Clear  RML Breath Sounds: Clear  RLL Breath Sounds: Clear  ROBE Breath Sounds: Clear  LLL Breath Sounds: Clear  Cardiac Assessment   Cardiac (WDL):  Within Defined Limits

## 2024-09-23 NOTE — CARE PLAN
Problem: Knowledge Deficit - Standard  Goal: Patient and family/care givers will demonstrate understanding of plan of care, disease process/condition, diagnostic tests and medications  Outcome: Progressing  Patient verbalized understanding plan of care.         Problem: Infection  Goal: Patient will remain free from infection  Outcome: Progressing    Patient verbalized understanding infection prevention education.

## 2024-09-23 NOTE — CARE PLAN
Problem: Infection  Goal: Patient will remain free from infection  Outcome: Progressing   Patient is on IV penicillin Q4 hours.   Problem: Pain - Standard  Goal: Alleviation of pain or a reduction in pain to the patient’s comfort goal  Outcome: Progressing   Patient is able to rate pain on 0-10 scale.

## 2024-09-23 NOTE — PROGRESS NOTES
"  Physical Medicine & Rehabilitation Progress Note    Encounter Date: 9/23/2024    Chief Complaint: Decreased mobility, weakness    Interval Events (Subjective):  Patient sitting up in room visiting with his 3 month old daughter. He reports he is doing well. He reports the dizziness has resolved. He reports a late night nose bleed. Discussed will add PRN Afrin    Objective:  VITAL SIGNS: /79   Pulse 66   Temp 36.2 °C (97.2 °F) (Oral)   Resp 18   Ht 1.676 m (5' 6\")   Wt 79.2 kg (174 lb 8 oz)   SpO2 94%   BMI 28.17 kg/m²   Gen: NAD  Psych: Mood and affect appropriate  CV: RRR, 0 edema  Resp: CTAB, no upper airway sounds  Abd: NTND  Neuro: AOx4, following commands    Laboratory Values:  Recent Results (from the past 72 hour(s))   MAGNESIUM    Collection Time: 09/23/24  5:43 AM   Result Value Ref Range    Magnesium 2.2 1.5 - 2.5 mg/dL   CBC WITH DIFFERENTIAL    Collection Time: 09/23/24  5:43 AM   Result Value Ref Range    WBC 5.3 4.8 - 10.8 K/uL    RBC 4.59 (L) 4.70 - 6.10 M/uL    Hemoglobin 13.8 (L) 14.0 - 18.0 g/dL    Hematocrit 40.2 (L) 42.0 - 52.0 %    MCV 87.6 81.4 - 97.8 fL    MCH 30.1 27.0 - 33.0 pg    MCHC 34.3 32.3 - 36.5 g/dL    RDW 40.0 35.9 - 50.0 fL    Platelet Count 204 164 - 446 K/uL    MPV 9.0 9.0 - 12.9 fL    Neutrophils-Polys 61.80 44.00 - 72.00 %    Lymphocytes 24.80 22.00 - 41.00 %    Monocytes 10.50 0.00 - 13.40 %    Eosinophils 2.30 0.00 - 6.90 %    Basophils 0.20 0.00 - 1.80 %    Immature Granulocytes 0.40 0.00 - 0.90 %    Nucleated RBC 0.00 0.00 - 0.20 /100 WBC    Neutrophils (Absolute) 3.30 1.82 - 7.42 K/uL    Lymphs (Absolute) 1.32 1.00 - 4.80 K/uL    Monos (Absolute) 0.56 0.00 - 0.85 K/uL    Eos (Absolute) 0.12 0.00 - 0.51 K/uL    Baso (Absolute) 0.01 0.00 - 0.12 K/uL    Immature Granulocytes (abs) 0.02 0.00 - 0.11 K/uL    NRBC (Absolute) 0.00 K/uL   Comp Metabolic Panel    Collection Time: 09/23/24  5:43 AM   Result Value Ref Range    Sodium 140 135 - 145 mmol/L    Potassium 4.0 " 3.6 - 5.5 mmol/L    Chloride 108 96 - 112 mmol/L    Co2 21 20 - 33 mmol/L    Anion Gap 11.0 7.0 - 16.0    Glucose 95 65 - 99 mg/dL    Bun 15 8 - 22 mg/dL    Creatinine 1.09 0.50 - 1.40 mg/dL    Calcium 8.2 (L) 8.5 - 10.5 mg/dL    Correct Calcium 8.6 8.5 - 10.5 mg/dL    AST(SGOT) 22 12 - 45 U/L    ALT(SGPT) 90 (H) 2 - 50 U/L    Alkaline Phosphatase 45 30 - 99 U/L    Total Bilirubin 0.4 0.1 - 1.5 mg/dL    Albumin 3.5 3.2 - 4.9 g/dL    Total Protein 5.6 (L) 6.0 - 8.2 g/dL    Globulin 2.1 1.9 - 3.5 g/dL    A-G Ratio 1.7 g/dL   Sed Rate    Collection Time: 09/23/24  5:43 AM   Result Value Ref Range    Sed Rate Westergren 7 0 - 20 mm/hour   ESTIMATED GFR    Collection Time: 09/23/24  5:43 AM   Result Value Ref Range    GFR (CKD-EPI) 91 >60 mL/min/1.73 m 2       Medications:  Scheduled Medications   Medication Dose Frequency    vitamin D3  1,000 Units DAILY    Pharmacy Consult Request  1 Each PHARMACY TO DOSE    senna-docusate  2 Tablet BID    omeprazole  20 mg DAILY    enoxaparin (LOVENOX) injection  40 mg DAILY AT 1800    penicillin g  4 Million Units Q4HRS     PRN medications: hydrALAZINE, acetaminophen, senna-docusate **AND** polyethylene glycol/lytes, docusate sodium, magnesium hydroxide, carboxymethylcellulose, benzocaine-menthol, mag hydrox-al hydrox-simeth, ondansetron **OR** ondansetron, traZODone, sodium chloride, traMADol, midazolam, ibuprofen, melatonin    Diet:  Current Diet Order   Procedures    Diet Order Diet: Regular       Medical Decision Making and Plan:  Streptococcal meningitis - Patient with sepsis and meningitis with strep with ID consultation recommending 14 days Penicillin. End date is 9/27/24  -PT and OT for mobility and ADLs. Per guidelines, 15 hours per week between PT, OT and/or SLP.  -Follow-up Neurology. Continue Penicillin until Friday. Check weekly labs 9/23     S1 L nerve root compression - Found on MRI of L spine, will need outpatient follow-up     Anemia - Check AM CBC - 14.9, improved,  will recheck next week. Repeat 13.8, will monitor      Hyperglycemia - Check AM CMP, was on steroids. A1c 5.6, most likely steroids.      Azotemia - Check AM CMP - 18, improved from 23. Repeat 15, resolved.      Elevated LFTs - elevated on admission, will recheck next week. ALT still elevated will monitor    Pain - Patient on PRN Tylenol, Ibuprofen and Tramadol     Vitamin D insufficiency - 25 on admission, start 1000 U     Skin - Patient at risk for skin breakdown due to debility in areas including sacrum, achilles, elbows and head in addition to other sites. Nursing to assess skin daily.      GI Ppx - Patient on Prilosec for GERD prophylaxis. Patient on Senna-docusate for constipation prophylaxis.      DVT Ppx - Patient Lovenox on transfer. Ambulating > 150 feet, discontinue Lovenox  ____________________________________    T. Manish Sousa MD/PhD  [facility] - Physical Medicine & Rehabilitation   [facility] - Brain Injury Medicine   ____________________________________

## 2024-09-24 ENCOUNTER — APPOINTMENT (OUTPATIENT)
Dept: OCCUPATIONAL THERAPY | Facility: REHABILITATION | Age: 34
DRG: 095 | End: 2024-09-24
Attending: PHYSICAL MEDICINE & REHABILITATION
Payer: OTHER GOVERNMENT

## 2024-09-24 ENCOUNTER — APPOINTMENT (OUTPATIENT)
Dept: PHYSICAL THERAPY | Facility: REHABILITATION | Age: 34
DRG: 095 | End: 2024-09-24
Attending: PHYSICAL MEDICINE & REHABILITATION
Payer: OTHER GOVERNMENT

## 2024-09-24 PROCEDURE — 97116 GAIT TRAINING THERAPY: CPT

## 2024-09-24 PROCEDURE — 770010 HCHG ROOM/CARE - REHAB SEMI PRIVAT*

## 2024-09-24 PROCEDURE — 97530 THERAPEUTIC ACTIVITIES: CPT

## 2024-09-24 PROCEDURE — 700102 HCHG RX REV CODE 250 W/ 637 OVERRIDE(OP): Performed by: PHYSICAL MEDICINE & REHABILITATION

## 2024-09-24 PROCEDURE — 700105 HCHG RX REV CODE 258: Performed by: PHYSICAL MEDICINE & REHABILITATION

## 2024-09-24 PROCEDURE — 700111 HCHG RX REV CODE 636 W/ 250 OVERRIDE (IP): Performed by: PHYSICAL MEDICINE & REHABILITATION

## 2024-09-24 PROCEDURE — A9270 NON-COVERED ITEM OR SERVICE: HCPCS | Performed by: PHYSICAL MEDICINE & REHABILITATION

## 2024-09-24 PROCEDURE — 97110 THERAPEUTIC EXERCISES: CPT

## 2024-09-24 PROCEDURE — 99233 SBSQ HOSP IP/OBS HIGH 50: CPT | Performed by: PHYSICAL MEDICINE & REHABILITATION

## 2024-09-24 RX ADMIN — SODIUM CHLORIDE 4 MILLION UNITS: 9 INJECTION, SOLUTION INTRAVENOUS at 09:42

## 2024-09-24 RX ADMIN — IBUPROFEN 400 MG: 400 TABLET, FILM COATED ORAL at 08:31

## 2024-09-24 RX ADMIN — SODIUM CHLORIDE 4 MILLION UNITS: 9 INJECTION, SOLUTION INTRAVENOUS at 05:54

## 2024-09-24 RX ADMIN — SODIUM CHLORIDE 4 MILLION UNITS: 9 INJECTION, SOLUTION INTRAVENOUS at 21:39

## 2024-09-24 RX ADMIN — Medication 10.5 MG: at 20:11

## 2024-09-24 RX ADMIN — Medication 1000 UNITS: at 08:31

## 2024-09-24 RX ADMIN — SODIUM CHLORIDE 4 MILLION UNITS: 9 INJECTION, SOLUTION INTRAVENOUS at 18:02

## 2024-09-24 RX ADMIN — SODIUM CHLORIDE 4 MILLION UNITS: 9 INJECTION, SOLUTION INTRAVENOUS at 14:16

## 2024-09-24 RX ADMIN — SODIUM CHLORIDE 4 MILLION UNITS: 9 INJECTION, SOLUTION INTRAVENOUS at 01:57

## 2024-09-24 ASSESSMENT — GAIT ASSESSMENTS
ASSISTIVE DEVICE: NONE;OTHER (COMMENTS)
DEVIATION: NO DEVIATION
GAIT LEVEL OF ASSIST: INDEPENDENT
GAIT LEVEL OF ASSIST: SUPERVISED
DEVIATION: NO DEVIATION

## 2024-09-24 ASSESSMENT — PAIN DESCRIPTION - PAIN TYPE
TYPE: ACUTE PAIN
TYPE: ACUTE PAIN

## 2024-09-24 NOTE — DISCHARGE PLANNING
MARU VOALTE'd ID Doctor, Erik Cisse to see if orders can be sent for OP Infusion.  Awaiting response.      15:03-CM rec'd answer from ID doc.  Patient can go home with oral ABX's tomorrow.  See note in EPIC.  CM updated Attending and scheduling.  Patient has no other DC needs.

## 2024-09-24 NOTE — CARE PLAN
Problem: Mobility  Goal: STG-Within one week, patient will ambulate community distances in different surfaces, independent x 1500'  Outcome: Met

## 2024-09-24 NOTE — PROGRESS NOTES
Brief ID note:    Contacted by  as patient currently at rehab has recovered well and would like to be discharged ASAP.  He is currently on IV penicillin for Strep pneumo meningitis with a stop date of 9/26.    Chart reviewed.  He has only 2 days of antibiotics left and the organism is levofloxacin susceptible, thus okay to stop IV antibiotics, remove midline/PICC line and complete course with p.o. levofloxacin 750 mg daily

## 2024-09-24 NOTE — THERAPY
Physical Therapy   Daily Treatment     Patient Name: Kwesi Rivera  Age:  34 y.o., Sex:  male  Medical Record #: [id number]  Today's Date: 9/23/2024     Precautions  Comments: high functional    Subjective    Pt is in his bed and connected to IV. He is agreeable to participate in therapy.      Objective       09/23/24 1501   PT Charge Group   PT Gait Training (Units) 1   PT Therapeutic Exercise (Units) 1   PT Total Time Spent   PT Individual Total Time Spent (Mins) 30   Gait Functional Level of Assist    Gait Level Of Assist Supervised   Assistive Device None  (with IV pole)   Distance (Feet)   (around the hallways x 2 rds)   # of Times Distance was Traveled 2   Deviation No deviation   Transfer Functional Level of Assist   Bed, Chair, Wheelchair Transfer Independent   Supine Lower Body Exercise   Other Exercises JAGDISH hamstring stretching, dead bug x 3 sets of 10   Sitting Lower Body Exercises   Other Exercises russian twist using 6lbs ball 3 sets of 10   Bed Mobility    Supine to Sit Independent   Sit to Supine Independent   Sit to Stand Independent   Scooting Independent   Rolling Independent   Interdisciplinary Plan of Care Collaboration   Patient Position at End of Therapy In Bed;Call Light within Reach;Tray Table within Reach;Phone within Reach         Assessment    Pt performed supine and seated thera ex and stretching with cueing for proper exercise techniques. He completed all the exercises and gait training without pain, fatigue and lob.    Strengths: Able to follow instructions, Adequate strength, Independent prior level of function, Pleasant and cooperative, Supportive family, Alert and oriented, Good balance    Plan    Strengthening, Activity Tolerance    DME       Passport items to be completed:  Get in/out of bed safely, in/out of a vehicle, safely use mobility device, walk or wheel around home/community, navigate up and down stairs, show how to get up/down from the ground, ensure home is accessible,  demonstrate HEP, complete caregiver training    Physical Therapy Problems (Active)       Problem: Mobility       Dates: Start:  09/20/24         Goal: STG-Within one week, patient will ambulate community distances in different surfaces, independent x 1500'       Dates: Start:  09/20/24               Problem: PT-Long Term Goals       Dates: Start:  09/20/24         Goal: LTG- Patient will be able to participate in community outing and reintegration.       Dates: Start:  09/20/24

## 2024-09-24 NOTE — CARE PLAN
The patient is Stable - Low risk of patient condition declining or worsening    Shift Goals  Clinical Goals: Safety  Patient Goals: Safety    Progress made toward(s) clinical / shift goals:    Problem: Knowledge Deficit - Standard  Goal: Patient and family/care givers will demonstrate understanding of plan of care, disease process/condition, diagnostic tests and medications  Outcome: Progressing  Note: Patient received notice that he will be discharging tomorrow on oral antibiotics.      Problem: Hemodynamics  Goal: Patient's hemodynamics, fluid balance and neurologic status will be stable or improve  Outcome: Progressing  Note:    Latest Reference Range & Units 09/23/24 05:43   WBC 4.8 - 10.8 K/uL 5.3   RBC 4.70 - 6.10 M/uL 4.59 (L)   Hemoglobin 14.0 - 18.0 g/dL 13.8 (L)   Hematocrit 42.0 - 52.0 % 40.2 (L)   MCV 81.4 - 97.8 fL 87.6   MCH 27.0 - 33.0 pg 30.1   MCHC 32.3 - 36.5 g/dL 34.3   RDW 35.9 - 50.0 fL 40.0   Platelet Count 164 - 446 K/uL 204   MPV 9.0 - 12.9 fL 9.0   Neutrophils-Polys 44.00 - 72.00 % 61.80   Neutrophils (Absolute) 1.82 - 7.42 K/uL 3.30   Lymphocytes 22.00 - 41.00 % 24.80   Lymphs (Absolute) 1.00 - 4.80 K/uL 1.32   Monocytes 0.00 - 13.40 % 10.50   Monos (Absolute) 0.00 - 0.85 K/uL 0.56   Eosinophils 0.00 - 6.90 % 2.30   Eos (Absolute) 0.00 - 0.51 K/uL 0.12   Basophils 0.00 - 1.80 % 0.20   Baso (Absolute) 0.00 - 0.12 K/uL 0.01   Immature Granulocytes 0.00 - 0.90 % 0.40   Immature Granulocytes (abs) 0.00 - 0.11 K/uL 0.02   Nucleated RBC 0.00 - 0.20 /100 WBC 0.00   NRBC (Absolute) K/uL 0.00   Sed Rate Westergren 0 - 20 mm/hour 7   Sodium 135 - 145 mmol/L 140   Potassium 3.6 - 5.5 mmol/L 4.0   Chloride 96 - 112 mmol/L 108   Co2 20 - 33 mmol/L 21   Anion Gap 7.0 - 16.0  11.0   Glucose 65 - 99 mg/dL 95   Bun 8 - 22 mg/dL 15   Creatinine 0.50 - 1.40 mg/dL 1.09   GFR (CKD-EPI) >60 mL/min/1.73 m 2 91   Calcium 8.5 - 10.5 mg/dL 8.2 (L)   Correct Calcium 8.5 - 10.5 mg/dL 8.6   AST(SGOT) 12 - 45 U/L 22    ALT(SGPT) 2 - 50 U/L 90 (H)   Alkaline Phosphatase 30 - 99 U/L 45   Total Bilirubin 0.1 - 1.5 mg/dL 0.4   Albumin 3.2 - 4.9 g/dL 3.5   Total Protein 6.0 - 8.2 g/dL 5.6 (L)   Globulin 1.9 - 3.5 g/dL 2.1   A-G Ratio g/dL 1.7   Magnesium 1.5 - 2.5 mg/dL 2.2        Patient is not progressing towards the following goals:

## 2024-09-24 NOTE — PROGRESS NOTES
NURSING DAILY NOTE    Name: Kwesi Rivera   Date of Admission: 9/19/2024   Admitting Diagnosis: Streptococcus pneumoniae meningitis  Attending Physician: BELGICA BAIRD M.D.  Allergies: Patient has no known allergies.    Safety  Patient Assist     Patient Precautions     Precaution Comments  high functional  Bed Transfer Status  Independent  Toilet Transfer Status   Independent  Assistive Devices  None  Oxygen  None - Room Air  Diet/Therapeutic Dining  Current Diet Order   Procedures    Diet Order Diet: Regular     Pill Administration  whole  Agitated Behavioral Scale     ABS Level of Severity       Fall Risk  Has the patient had a fall this admission?   No  Lola Murrieta Fall Risk Scoring  5, NO RISK  Fall Risk Safety Measures  bed alarm and chair alarm    Vitals  Temperature: 36.6 °C (97.9 °F)  Temp src: Oral  Pulse: 74  Respiration: 18  Blood Pressure: 123/74  Blood Pressure MAP (Calculated): 90 MM HG  BP Location: Left, Upper Arm  Patient BP Position: Sitting     Oxygen  Pulse Oximetry: 98 %  O2 Delivery Device: None - Room Air    Bowel and Bladder  Last Bowel Movement  09/21/24  Stool Type  Type 4: Like a sausage or snake, smooth and soft  Bowel Device     Continent  Bladder: Continent void   Bowel: Continent movement  Bladder Function  Number of Times Voided: 1  Urine Color: Yellow  Genitourinary Assessment   Bladder Assessment (WDL):  Within Defined Limits  Urine Color: Yellow  Bladder Device: Bathroom  Bladder Scan: Post Void  $ Bladder Scan Results (mL): 24    Skin  Kenneth Score   20  Sensory Interventions   Skin Preventative Measures: Pillows in Use for Support / Positioning  Moisture Interventions         Pain  Pain Rating Scale  0 - No Pain  Pain Location  Head  Pain Location Orientation  Posterior  Pain Interventions   Declines    ADLs    Bathing   Patient Refused Bathing  Linen Change   Partial  Personal Hygiene  Perineal Care, Change  Ynes Pads  Chlorhexidine Bath      Oral Care  Brushed Teeth  Teeth/Dentures     Shave     Nutrition Percentage Eaten  Lunch, Between % Consumed  Environmental Precautions     Patient Turns/Positioning  Patient Turns Self from Side to Side  Patient Turns Assistance/Tolerance     Bed Positions  Bed Controls On  Head of Bed Elevated         Psychosocial/Neurologic Assessment  Psychosocial Assessment  Psychosocial (WDL):  Within Defined Limits  Neurologic Assessment  Neuro (WDL): Exceptions to WDL  Level of Consciousness: Alert  Orientation Level: Oriented X4  Cognition: Appropriate judgement  Speech: Clear  Pupil Assesment: No  EENT (WDL):  Within Defined Limits    Cardio/Pulmonary Assessment  Edema      Respiratory Breath Sounds  RUL Breath Sounds: Clear  RML Breath Sounds: Clear  RLL Breath Sounds: Clear  ROBE Breath Sounds: Clear  LLL Breath Sounds: Clear  Cardiac Assessment   Cardiac (WDL):  Within Defined Limits

## 2024-09-24 NOTE — CARE PLAN
Problem: Pain - Standard  Goal: Alleviation of pain or a reduction in pain to the patient’s comfort goal  Outcome: Progressing   Patient is able to rate pain on 0-10 scale. PRN Ibuprofen given for mild headache.   Problem: Infection  Goal: Patient will remain free from infection  Outcome: Progressing  Patient is receiving IV abx Q 4 hrs.

## 2024-09-24 NOTE — PROGRESS NOTES
NURSING DAILY NOTE    Name: Kwesi Rivera   Date of Admission: 9/19/2024   Admitting Diagnosis: Streptococcus pneumoniae meningitis  Attending Physician: BELGICA BAIRD M.D.  Allergies: Patient has no known allergies.    Safety  Patient Assist  Supervision  Patient Precautions     Precaution Comments  high functional  Bed Transfer Status  Independent  Toilet Transfer Status   Independent  Assistive Devices  None  Oxygen  None - Room Air  Diet/Therapeutic Dining  Current Diet Order   Procedures    Diet Order Diet: Regular     Pill Administration  whole  Agitated Behavioral Scale     ABS Level of Severity       Fall Risk  Has the patient had a fall this admission?   No  Lola Murrieta Fall Risk Scoring  5, NO RISK  Fall Risk Safety Measures  bed alarm and ok to leave pt in bathroom    Vitals  Temperature: 36.6 °C (97.9 °F)  Temp src: Oral  Pulse: (!) 55 (Nurse noted.)  Respiration: 18  Blood Pressure: 112/60  Blood Pressure MAP (Calculated): 77 MM HG  BP Location: Left, Upper Arm  Patient BP Position: Supine     Oxygen  Pulse Oximetry: 94 %  O2 (LPM): 0  O2 Delivery Device: None - Room Air    Bowel and Bladder  Last Bowel Movement  09/21/24  Stool Type  Type 4: Like a sausage or snake, smooth and soft  Bowel Device     Continent  Bladder: Continent void   Bowel: Continent movement  Bladder Function  Number of Times Voided: 1  Urine Color: Yellow  Genitourinary Assessment   Bladder Assessment (WDL):  Within Defined Limits  Urine Color: Yellow  Bladder Device: Bathroom  Bladder Scan: Post Void  $ Bladder Scan Results (mL): 24    Skin  Kenneth Score   20  Sensory Interventions   Skin Preventative Measures: Pillows in Use for Support / Positioning  Moisture Interventions         Pain  Pain Rating Scale  2 - Notice Pain, does not interfere with activities  Pain Location  Head  Pain Location Orientation  Posterior  Pain Interventions   Medication (see  MAR)    ADLs    Bathing   Shower  Linen Change   Complete  Personal Hygiene  Moist Ynes Wipes  Chlorhexidine Bath      Oral Care  Brushed Teeth  Teeth/Dentures     Shave     Nutrition Percentage Eaten  Lunch, Between % Consumed  Environmental Precautions     Patient Turns/Positioning  Patient Turns Self from Side to Side  Patient Turns Assistance/Tolerance     Bed Positions  Bed Controls On, Bed Locked  Head of Bed Elevated         Psychosocial/Neurologic Assessment  Psychosocial Assessment  Psychosocial (WDL):  Within Defined Limits  Neurologic Assessment  Neuro (WDL): Exceptions to WDL  Level of Consciousness: Alert  Orientation Level: Oriented X4  Cognition: Appropriate judgement  Speech: Clear  Pupil Assesment: No  EENT (WDL):  Within Defined Limits    Cardio/Pulmonary Assessment  Edema      Respiratory Breath Sounds  RUL Breath Sounds: Clear  RML Breath Sounds: Clear  RLL Breath Sounds: Clear  ROBE Breath Sounds: Clear  LLL Breath Sounds: Clear  Cardiac Assessment   Cardiac (WDL):  Within Defined Limits

## 2024-09-24 NOTE — THERAPY
Occupational Therapy  Daily Treatment     Patient Name: Kwesi Rivera  Age:  34 y.o., Sex:  male  Medical Record #: [id number]  Today's Date: 9/24/2024     Precautions  Comments: high functional         Subjective    Pt agreeable for an OT tx session.     Objective       09/24/24 1101   OT Charge Group   OT Therapy Activity (Units) 2   OT Total Time Spent   OT Individual Total Time Spent (Mins) 30   Pain   Intervention Declines   Non Verbal Descriptors   Non Verbal Scale  Calm   Standing Upper Body Exercises   Standing Upper Body Exercises Yes   Other Exercises Pt performed therapeutic activity outside in this facility's courtyard on putting green using putter and golf balls. Pt able to ambulate the green and use putter without loss of balance and retrieve golf balls from the holes.   Interdisciplinary Plan of Care Collaboration   IDT Collaboration with  Family / Caregiver;Nursing   Patient Position at End of Therapy Seated;Call Light within Reach;Tray Table within Reach;Phone within Reach;Family / Friend in Room  (Seated in chair in room with spouse and baby daughter.)         Assessment    Pt pleasant and fully participated in OT tx session. No c/o pain/discomfort verbalized during session. No loss of balance noted during therapeutic activity.  Strengths: Able to follow instructions, Adequate strength, Alert and oriented, Effective communication skills, Good balance, Good endurance, Good insight into deficits/needs, Independent prior level of function, Making steady progress towards goals, Manages pain appropriately, Motivated for self care and independence, Pleasant and cooperative, Supportive family, Willingly participates in therapeutic activities, Good carryover of learning    Plan    Will continue with OT POC.    DME             Occupational Therapy Goals (Active)       There are no active problems.

## 2024-09-24 NOTE — THERAPY
"Occupational Therapy  Daily Treatment     Patient Name: Kwesi Rivera  Age:  34 y.o., Sex:  male  Medical Record #: [id number]  Today's Date: 9/24/2024     Precautions  Comments: high functional         Subjective    \"I feel like I'm back to my old self, which is pretty remarkable. I've been doing a lot of research on other cases on meningitis and others have not been so gopi- some people have loss of hearing or vision or even lost their limbs.\"      Objective       09/24/24 1301   OT Charge Group   OT Therapy Activity (Units) 4   OT Total Time Spent   OT Individual Total Time Spent (Mins) 60   Functional Level of Assist   Bed, Chair, Wheelchair Transfer Independent   Bed Mobility    Supine to Sit Independent   Sit to Supine Independent   Scooting Independent   Rolling Independent   Interdisciplinary Plan of Care Collaboration   IDT Collaboration with  Family / Caregiver   Patient Position at End of Therapy In Bed;Call Light within Reach;Tray Table within Reach;Phone within Reach   Collaboration Comments wife and baby present at start/end of session     Pt engaged in outing to [facility]; ambulated from rehab hospital<>[facility], walking on uneven surfaces across gravel/rocks and uneven sidewalks, pt climbed stairs up/down with and without use of hand rail, up and down ramps within hospital, navigated around busy hallways and objects in pathway independently. Pt with no endurance issues or LOB throughout.     Assessment    Pt tolerated session well. Pt completed outing to [facility] with no issues noted- ambulating with no AD- long community distances with no rest break needed. Pt ambulated around cafeteria with lots of people and lines, starbucks and gift shops independently. Pt demonstrated no concerns/LOB while navigating on uneven surfaces various surfaces outdoors. Increased time taken to try and set-up outing with wife and children tomorrow. As per case mgmt at end of day, pt is to d/c home tomorrow " with oral antibiotics and OP infusions. Pt reported no concerns at this time about functional level and feels like he's back to his baseline functioning.     Strengths: Able to follow instructions, Adequate strength, Alert and oriented, Effective communication skills, Good balance, Good endurance, Good insight into deficits/needs, Independent prior level of function, Making steady progress towards goals, Manages pain appropriately, Motivated for self care and independence, Pleasant and cooperative, Supportive family, Willingly participates in therapeutic activities, Good carryover of learning    Plan    D/c home with family       Occupational Therapy Goals (Active)       There are no active problems.

## 2024-09-24 NOTE — THERAPY
Physical Therapy   Daily Treatment     Patient Name: Kwesi Rivera  Age:  34 y.o., Sex:  male  Medical Record #: [id number]  Today's Date: 9/24/2024     Precautions  Comments: high functional    Subjective    Pt denies pain, headache and fatigue. He is still a bit sore from exercises. He tells this PT that she likes to go with his wife at [facility] for an outing tomorrow.     Objective       09/24/24 0831   PT Charge Group   PT Gait Training (Units) 1   PT Therapeutic Exercise (Units) 2   PT Therapeutic Activities (Units) 1   PT Total Time Spent   PT Individual Total Time Spent (Mins) 60   Pain 0 - 10 Group   Location Head   Location Orientation Posterior   Pain Rating Scale (NPRS) 0   Therapist Pain Assessment Prior to Activity  (he had headhache this am. But resolved before session)   Gait Functional Level of Assist    Gait Level Of Assist Supervised   Assistive Device None;Other (Comments)  (farmers carry, carrying two 10lbs DB)   Distance (Feet)   (outdoor, hospital perimeter)   # of Times Distance was Traveled 1   Deviation No deviation   Transfer Functional Level of Assist   Bed, Chair, Wheelchair Transfer Independent   Supine Lower Body Exercise   Other Exercises JAGDISH quads and hamstring stretch on edge of mat table(brenda test position) 5 sets x 30s hold each   Sitting Lower Body Exercises   Nustep Resistance Level 4;Resistance Level 5;Resistance Level 6;Resistance Level 7;Time (See Comments)  (x 17 mins with increasing resistance every 4 mins for B UE and LE then 1 min cool down L3.)   Standing Lower Body Exercises   Other Exercises attempted DB thrusters but pt had quads cramps. He performed DB deadlift 3 sets of 10   Bed Mobility    Supine to Sit Independent   Sit to Supine Independent   Sit to Stand Independent   Scooting Independent   Rolling Independent   Interdisciplinary Plan of Care Collaboration   Patient Position at End of Therapy Seated;Call Light within Reach;Tray Table within Reach;Phone within Reach          Assessment    He performed and completed  Nu steps for B UE and LE x 17 mins. Total steps 1159. Instructed in farmers carry using two 10lbs DB outdoor in the hospital perimeter in different surfaces, distant SUP. Instructed in supine stretching and standing DB deadlift using two 10lbs DB. He had seated rest breaks in between sets of exercises. He demonstrated steady progress in functional strength and activity tolerance.    Strengths: Able to follow instructions, Adequate strength, Independent prior level of function, Pleasant and cooperative, Supportive family, Alert and oriented, Good balance    Plan    Strengthening, Activity Tolerance   Community Ambulation/Outings    DME       Passport items to be completed:  Get in/out of bed safely, in/out of a vehicle, safely use mobility device, walk or wheel around home/community, navigate up and down stairs, show how to get up/down from the ground, ensure home is accessible, demonstrate HEP, complete caregiver training    Physical Therapy Problems (Active)       Problem: Mobility       Dates: Start:  09/20/24         Goal: STG-Within one week, patient will ambulate community distances in different surfaces, independent x 1500'       Dates: Start:  09/20/24               Problem: PT-Long Term Goals       Dates: Start:  09/20/24         Goal: LTG- Patient will be able to participate in community outing and reintegration.       Dates: Start:  09/20/24

## 2024-09-25 VITALS
SYSTOLIC BLOOD PRESSURE: 127 MMHG | BODY MASS INDEX: 28.04 KG/M2 | DIASTOLIC BLOOD PRESSURE: 80 MMHG | HEART RATE: 76 BPM | HEIGHT: 66 IN | TEMPERATURE: 97.8 F | OXYGEN SATURATION: 96 % | RESPIRATION RATE: 18 BRPM | WEIGHT: 174.5 LBS

## 2024-09-25 PROCEDURE — 700111 HCHG RX REV CODE 636 W/ 250 OVERRIDE (IP): Performed by: PHYSICAL MEDICINE & REHABILITATION

## 2024-09-25 PROCEDURE — 700102 HCHG RX REV CODE 250 W/ 637 OVERRIDE(OP): Performed by: PHYSICAL MEDICINE & REHABILITATION

## 2024-09-25 PROCEDURE — 99239 HOSP IP/OBS DSCHRG MGMT >30: CPT | Performed by: PHYSICAL MEDICINE & REHABILITATION

## 2024-09-25 PROCEDURE — A9270 NON-COVERED ITEM OR SERVICE: HCPCS | Performed by: PHYSICAL MEDICINE & REHABILITATION

## 2024-09-25 PROCEDURE — 700105 HCHG RX REV CODE 258: Performed by: PHYSICAL MEDICINE & REHABILITATION

## 2024-09-25 RX ORDER — LEVOFLOXACIN 250 MG/1
750 TABLET, FILM COATED ORAL DAILY
Qty: 6 TABLET | Refills: 0 | Status: ACTIVE | OUTPATIENT
Start: 2024-09-25 | End: 2024-09-25

## 2024-09-25 RX ORDER — LEVOFLOXACIN 250 MG/1
750 TABLET, FILM COATED ORAL DAILY
Qty: 6 TABLET | Refills: 0 | Status: ACTIVE | OUTPATIENT
Start: 2024-09-25

## 2024-09-25 RX ORDER — LEVOFLOXACIN 250 MG/1
750 TABLET, FILM COATED ORAL DAILY
Status: DISCONTINUED | OUTPATIENT
Start: 2024-09-25 | End: 2024-09-25 | Stop reason: HOSPADM

## 2024-09-25 RX ADMIN — Medication 1000 UNITS: at 07:29

## 2024-09-25 RX ADMIN — LEVOFLOXACIN 750 MG: 250 TABLET, FILM COATED ORAL at 11:33

## 2024-09-25 RX ADMIN — SODIUM CHLORIDE 4 MILLION UNITS: 9 INJECTION, SOLUTION INTRAVENOUS at 01:50

## 2024-09-25 RX ADMIN — SODIUM CHLORIDE 4 MILLION UNITS: 9 INJECTION, SOLUTION INTRAVENOUS at 09:56

## 2024-09-25 RX ADMIN — SODIUM CHLORIDE 4 MILLION UNITS: 9 INJECTION, SOLUTION INTRAVENOUS at 05:52

## 2024-09-25 ASSESSMENT — BRIEF INTERVIEW FOR MENTAL STATUS (BIMS)
WHAT DAY OF THE WEEK IS IT: CORRECT
BIMS SUMMARY SCORE: 15
ASKED TO RECALL SOCK: YES, NO CUE REQUIRED
INITIAL REPETITION OF BED BLUE SOCK - FIRST ATTEMPT: 3
WHAT YEAR IS IT: CORRECT
WHAT MONTH IS IT: ACCURATE WITHIN 5 DAYS
ASKED TO RECALL BED: YES, NO CUE REQUIRED
ASKED TO RECALL BLUE: YES, NO CUE REQUIRED

## 2024-09-25 ASSESSMENT — PATIENT HEALTH QUESTIONNAIRE - PHQ9
SUM OF ALL RESPONSES TO PHQ9 QUESTIONS 1 AND 2: 0
2. FEELING DOWN, DEPRESSED, IRRITABLE, OR HOPELESS: NOT AT ALL
1. LITTLE INTEREST OR PLEASURE IN DOING THINGS: NOT AT ALL

## 2024-09-25 ASSESSMENT — ACTIVITIES OF DAILY LIVING (ADL)
SHOWER_TRANSFER_LEVEL_OF_ASSIST: ABLE TO COMPLETE SHOWER TRANSFER WITHOUT ASSIST
TOILETING_LEVEL_OF_ASSIST: ABLE TO COMPLETE TOILETING WITHOUT ASSIST
TOILET_TRANSFER_LEVEL_OF_ASSIST: ABLE TO COMPLETE TOILET TRANSFER WITHOUT ASSIST

## 2024-09-25 ASSESSMENT — PAIN DESCRIPTION - PAIN TYPE: TYPE: ACUTE PAIN

## 2024-09-25 NOTE — CARE PLAN
Problem: Pain - Standard  Goal: Alleviation of pain or a reduction in pain to the patient’s comfort goal  Outcome: Progressing   Patient is able to rate pain on 0-10 scale.    Problem: Infection  Goal: Patient will remain free from infection  Outcome: Progressing   IV antibiotics Q 4 hrs.

## 2024-09-25 NOTE — PROGRESS NOTES
Patient discharged to home per order.  Reviewed all discharge instructions, appointments, discharge medications, and wound care instructions with patient and spouse; they verbalize understanding.  Education provided in discharge instructions about medication and Home Safety and Fall Prevention. Discharge paperwork completed; signed copies in chart.  Patient has education binder and all belongings; signed copy in chart.  Pt alert, calm, stable; no change in status from morning assessment.  Patient left facility at 1140 via ambulation accompanied by Spouse; escorted to car by staff.  Have enjoyed working with this pleasant patient.

## 2024-09-25 NOTE — DISCHARGE PLANNING
Case Management Discharge Instructions   Follow-up Information:     Family Doctor: please call your family doctor to schedule a follow up appointment.       [facility]  Follow up  [phone]  They will call to schedule follow up appointment.     [facility]  [address] [place] [address]  [phone]  Follow up-please call.

## 2024-09-25 NOTE — THERAPY
Physical Therapy   Discharge Summary     Patient Name: Kwesi Rivera  Age:  34 y.o., Sex:  male  Medical Record #: [id number]  Today's Date: 9/24/2024     Precautions  Comments: high functional    Subjective    Pt is agreeable to participate in therapy.      Objective       09/24/24 1631   PT Charge Group   PT Gait Training (Units) 1   PT Therapeutic Exercise (Units) 1   PT Total Time Spent   PT Individual Total Time Spent (Mins) 30   Gait Functional Level of Assist    Gait Level Of Assist Independent   Assistive Device None   Deviation No deviation   Stairs Functional Level of Assist   Level of Assist with Stairs Independent   Transfer Functional Level of Assist   Bed, Chair, Wheelchair Transfer Independent   Standing Lower Body Exercises   Other Exercises discussed and educated pt in HEP he can do at home without equiptment.(see notes)   Bed Mobility    Supine to Sit Independent   Sit to Supine Independent   Sit to Stand Independent   Scooting Independent   Rolling Independent   Interdisciplinary Plan of Care Collaboration   Patient Position at End of Therapy Seated;Call Light within Reach;Tray Table within Reach;Phone within Reach   Roll Left and Right   Assistance Needed Independent   CARE Score - Roll Left and Right 6   Sit to Lying   Assistance Needed Independent   CARE Score - Sit to Lying 6   Lying to Sitting on Side of Bed   Assistance Needed Independent   CARE Score - Lying to Sitting on Side of Bed 6   Sit to Stand   Assistance Needed Independent   CARE Score - Sit to Stand 6   Chair/Bed-to-Chair Transfer   Assistance Needed Independent   CARE Score - Chair/Bed-to-Chair Transfer 6   Car Transfer   Assistance Needed Independent   CARE Score - Car Transfer 6   Walk 10 Feet   Assistance Needed Independent   CARE Score - Walk 10 Feet 6   Walk 50 Feet with Two Turns   Assistance Needed Independent   CARE Score - Walk 50 Feet with Two Turns 6   Walk 150 Feet   Assistance Needed Independent   CARE Score - Walk 150  Feet 6   Walking 10 Feet on Uneven Surfaces   Assistance Needed Independent   CARE Score - Walking 10 Feet on Uneven Surfaces 6   1 Step (Curb)   Assistance Needed Independent   CARE Score - 1 Step (Curb) 6   4 Steps   Assistance Needed Independent   CARE Score - 4 Steps 6   12 Steps   Assistance Needed Independent   CARE Score - 12 Steps 6   Picking Up Object   Assistance Needed Independent   CARE Score - Picking Up Object 6   Wheel 50 Feet with Two Turns   Reason if not Attempted Activity not applicable   CARE Score - Wheel 50 Feet with Two Turns 9   Type of Wheelchair/Scooter Manual   Wheel 150 Feet   Reason if not Attempted Activity not applicable   CARE Score - Wheel 150 Feet 9   Type of Wheelchair/Scooter Manual   P.T. Discharge Summary   Discharge Location Home   Patient Discharging with Assist of Family   Level of Supervision Required Upon Discharge No Supervision   Recommended Equipment for Discharge None   Recommeded Services Upon Discharge No Follow Up Services Recommended   Long Term Goals Met 1   Long Term Goals Not Met 0   Criteria for Termination of Services Maximum Function Achieved for Inpatient Rehabilitation   Discharge Instructions to Patient   Level of Assist Required for Ambulation No Assist on Flat Surfaces;No Assist on Curbs;No Assist on Stairs   Distance Patient May Ambulate   (community level ambulation)   Device Recommended for Ambulation None   Level of Assist Required for Transfers Requires No Assist   Device Recommended for Transfers None   Home Exercise Program Refer to Home Exercise Program Handout for Details   Prosthesis / Orthosis Recommendation / Location No Prosthesis  or Orthosis Recommended       Assessment    Patient is independent in all functional mobility including outdoor ambulation in different surfaces. PT reviewed and instructed pt in HEP consisting of:  Banded side stepping/forward and backward stepping  Air squats progressing to goblet squats carrying gallon of water  for weights  Lunges and Urdu split squat  Walking outdoor/ farmers carry  Leg press using TB and resisted hip abd.  Completed [id number]  Strengths: Able to follow instructions, Adequate strength, Independent prior level of function, Pleasant and cooperative, Supportive family, Alert and oriented, Good balance    Plan    Dc home tomorrow    Passport items completed    Physical Therapy Problems (Active)       There are no active problems.

## 2024-09-25 NOTE — CARE PLAN
The patient is Stable - Low risk of patient condition declining or worsening    Shift Goals  Clinical Goals: Discharge  Patient Goals: Discharge    Progress made toward(s) clinical / shift goals:  Discharge today    Patient is not progressing towards the following goals:

## 2024-09-25 NOTE — DISCHARGE INSTRUCTIONS
Occupational Therapy Discharge Instructions for Kwesi Rivera  9/25/2024    Level of Assist Required for Eating: Able to Complete Eating without Assist  Level of Assist Required for Grooming: Able to Complete Grooming without Assist  Level of Assist Required for Dressing: Able to Complete Dressing without Assist  Level of Assist Required for Toileting: Able to Complete Toileting without Assist  Level of Assist Required for Toilet Transfer: Able to Complete Toilet Transfer without Assist  Level of Assist Required for Bathing: Able to Complete Bathing without Assist  Level of Assist Required for Shower Transfer: Able to Complete Shower Transfer without Assist  Level of Assist Required for Home Mgmt: Able to Complete Home Management without Assist  Level of Assist Required for Meal Prep: Requires Supervision with Meal Preparation  Driving: Please Contact Physician Prior to Driving    Best wishes with your recovery, Kwesi!     [email or web address] Discharge Instructions   Bacterial Meningitis, Adult         Bacterial meningitis is a serious infection that affects the membranes that line the brain and spinal cord (meninges). This infection must be treated as soon as possible. It can get worse quickly and can cause permanent brain damage and long-term (chronic) problems, including seizures and hearing loss.  What are the causes?  This condition is caused by bacteria. You can get this infection if fluid from an infected person's nose, mouth, or throat gets into:  Your body and travels to your brain. The bacteria may enter your body if you:  Breathe in droplets from an infected person's cough or sneeze.  Touch something that has been exposed to the bacteria (has been contaminated) and then touch your mouth, nose, eyes, or any open wounds or cuts.  Eat food that has been contaminated with bacteria by an infected person.  Your brain through a wound in your head.  What increases  the risk?  The following factors may make you more likely to develop this condition:  Living with, or having close contact with, someone who is infected with the bacteria that cause this infection.  Having had previous surgeries, such as:  Implantation of a device in the head, such as a cerebral shunt or a cochlear implant.  Brain or head surgery.  Removal of the spleen.  Having certain medical conditions, such as:  A weakened body defense system (immune system).  An infection in the head and neck area.  You do not have a spleen or your spleen does not work.  Diabetes or another chronic illness.  Alcohol use disorder.  Sickle cell disorder.  Having had a recent infection in the nose, throat, or airways (respiratory infection).  Having traveled to [place].  Living close to others, such as in college dorms.  Working in health care or day care facilities.  What are the signs or symptoms?  Symptoms of this condition usually start suddenly.  The most common symptoms are:  Fever.  Headache.  Neck stiffness.  A change in how you think, feel, or behave (altered mental status).  Other symptoms may include:  Nausea and vomiting.  A rash of red spots or purple blotches on the skin (petechiae).  Sensitivity to light.  Severe symptoms may include:  Loss of consciousness.  Seizures.  How is this diagnosed?  This condition is diagnosed based on your symptoms, your medical history, a physical exam, and tests. Tests may include:  A spinal tap, or lumbar puncture, to remove and test a sample of the fluid that surrounds your brain and spinal cord (cerebrospinal fluid, CSF). This is the most important test for diagnosing bacterial meningitis.  Blood tests.  Imaging tests, such as a CT scan or an MRI, to check for changes in your brain.  How is this treated?  This condition is usually treated at the hospital with IV antibiotics, fluids, and nutrition. Sometimes, steroid medicine is also given to limit brain swelling.  Treatment usually starts as soon as your health care provider thinks that you might have bacterial meningitis, which may be before testing has confirmed the diagnosis.  When treatment starts, you may get a combination of antibiotics to kill the bacteria that usually cause meningitis. If your test results show which type of bacteria is causing your infection, you may be given different antibiotics that are effective against the specific type.  Follow these instructions at home:  Medicines  Take your antibiotics as told by your health care provider. Do not stop taking the antibiotics even if you start to feel better.  Take other over-the-counter and prescription medicines only as told by your health care provider.  Infection control  To prevent spreading the infection to others, make sure you:  Avoid close contact with others until your health care provider says that you do not have a risk of spreading the disease to others (you are not contagious).  Stay home from work or school for as long as told by your health care provider.  Wash your hands often with soap and water for at least 20 seconds, especially after coughing or sneezing. If soap and water are not available, use hand .  Ask people you have close contact with to talk with a health care provider about preventing meningitis infection. They may need to start taking antibiotics and get vaccinated.  General instructions  Drink enough fluid to keep your urine pale yellow.  Rest as needed. Ask your health care provider what activities are safe for you.  Stay up to date on all of your vaccinations.  Where to find more information  Centers for Disease Control and Prevention: www.cdc.gov  Get help right away if:  Your symptoms get worse.  You develop any new symptoms.  You have loss of feeling (numbness) or weakness in any part of your body.  You have trouble walking or moving.  You have problems with speech, understanding, or vision.  You have a  seizure.  You lose consciousness.  These symptoms may be an emergency. Get help right away. Call 911.  Do not wait to see if the symptoms will go away.  Do not drive yourself to the hospital.  Summary  Bacterial meningitis is a serious infection that affects the membranes that line the brain and spinal cord (meninges). This condition must be treated as soon as possible.  Bacterial meningitis is caused by bacteria.  This condition is usually treated at the hospital with IV antibiotics, fluids, and nutrition.  Avoid close contact with others until your health care provider says that you do not have a risk of spreading the disease to others (you are not contagious).  Get help right away if your symptoms get worse.  This information is not intended to replace advice given to you by your health care provider. Make sure you discuss any questions you have with your health care provider.  Document Revised: 03/09/2023 Document Reviewed: 03/09/2023  [employer] Patient Education © 2023 [employer]    Infection Prevention in the Home  If you have an infection, may have been exposed to an infection, or are taking care of someone who has an infection, it is important to know how to keep the infection from spreading. Follow your health care provider's instructions and use these guidelines to help stop the spread of infection.  How infections are spread  In order for an infection to spread, the following must be present:  A germ. This may be a virus, bacteria, fungus, or parasite.  A place for the germ to live. This may be:  On or in a person, animal, plant, or food.  In soil or water.  On surfaces, such as a door handle.  A person or animal who can develop a disease if the germ enters the body (host). The host does not have resistance to the germ.  A way for the germ to enter the host. This may occur by:  Direct contact with an infected person or animal. This can happen through shaking hands or hugging. Some germs can also travel  through the air and spread to others. This can happen when an infected person coughs or sneezes on or near other people.  Indirect contact. This occurs when the germ enters the host through contact with an infected object. Examples include:  Eating or drinking food or water that is contaminated with the germ.  Touching a contaminated surface with your hands, and then touching your face, eyes, nose, or mouth.  Supplies needed:  Soap.  Alcohol-based hand .  Standard cleaning products.  Disinfectants, such as bleach.  Reusable cleaning cloths, sponges, or paper towels.  Disposable or reusable utility gloves.  How to prevent infection from spreading  There are several things that you can do to help prevent infection from spreading.  Take these general actions  Everyone should take the following actions to prevent the spread of infection:  Wash your hands often with soap and water for at least 20 seconds. If soap and water are not available, use alcohol-based hand .  Avoid touching your face, mouth, nose, or eyes.  Cough or sneeze into a tissue, sleeve, or elbow instead of into your hand or into the air.  If you cough or sneeze into a tissue, throw it away immediately and wash your hands.    Keep your bathroom clean  Provide soap.  Change towels and washcloths frequently.  Change toothbrushes often and store them separately in a clean, dry place.  Clean and disinfect all surfaces, including the toilet, floor, tub, shower, and sink.  Do not share personal items, such as razors, toothbrushes, deodorant, white, brushes, towels, and washcloths.  Maintain hygiene in the kitchen    Wash your hands before and after preparing food and before you eat.  Clean the inside of your refrigerator each week.  Keep your refrigerator set at 40°F (4°C) or less, and set your freezer at 0°F (-18°C) or less.  Keep work surfaces clean. Disinfect them regularly.  Wash your dishes in hot, soapy water. Air-dry your dishes or use  a .  Do not share dishes or eating utensils.  Handle food safely  Store food carefully.  Refrigerate leftovers promptly in covered containers.  Throw out stale or spoiled food.  Thaw foods in the refrigerator or microwave, not at room temperature.  Serve foods at the proper temperature. Do not eat raw meat. Make sure it is cooked to the appropriate temperature. Cook eggs until they are firm.  Wash fruits and vegetables under running water.  Use separate cutting boards, plates, and utensils for raw foods and cooked foods.  Use a clean spoon each time you sample food while cooking.  Do laundry the right way  Wear gloves if laundry is visibly soiled.  Do not shake soiled laundry. Doing that may send germs into the air.  Wash laundry in hot water.  If you cannot wash the laundry right away, place it in a plastic bag and wash it as soon as possible.  Be careful around animals and pets  Wash your hands before and after touching animals.  If you have a pet, ensure that your pet stays clean. Do not let people with weak immune systems touch bird droppings, fish tank water, or a litter box.  If you have a pet cage or litter box, be sure to clean it every day.  If you are sick, stay away from animals and have someone else care for them if possible.  How to clean and disinfect objects and surfaces  Precautions  Some disinfectants work for certain germs and not others. Read the 's instructions or read online resources to determine if the product you are using will work for the germ you are trying to remove.  If you choose to use bleach, use it safely. Never mix it with other cleaning products, especially those that contain ammonia. This mixture can create a dangerous gas that may be deadly.  Keep proper movement of fresh air in your home (ventilation).  Pour used mop water down the utility sink or toilet. Do not pour this water down the kitchen sink.  Objects and surfaces    If surfaces are visibly soiled,  clean them first with soap and water before disinfecting.  Disinfect surfaces that are frequently touched every day. This may include:  Counters.  Tables.  Doorknobs.  Sinks and faucets.  Electronics, such as:  Phones.  Remote controls.  Keyboards.  Computers and tablets.  Cleaning supplies  Some cleaning supplies can breed germs. Take good care of them to prevent germs from spreading. To do this:  Soak toilet brushes, mops, and sponges in bleach and water for 5 minutes after each use, or according to 's instructions.  Wash reusable cleaning cloths and sanitize sponges after each use.  Throw away disposable gloves after one use.  Replace reusable utility gloves if they are cracked or torn or if they start to peel.  Additional actions if you are sick  If you live with other people:    Avoid close contact with those around you. Stay at least 3 ft (1 m) away from others, if possible.  Use a separate bathroom, if possible.  If possible, sleep in a separate bedroom or in a separate bed to prevent infecting other household members.  Change bedroom linens each week or whenever they are soiled.  Have everyone in the household wash hands often with soap and water for at least 20 seconds. If soap and water are not available, use alcohol-based hand .  In general:  Stay home except to get medical care. Call ahead before visiting your health care provider.  Ask others to get groceries and household supplies and to refill prescriptions for you.  Avoid public areas. Try not to take public transportation.  If you can, wear a mask if you need to go out of the house, or if you are in close contact with someone who is not sick.  Avoid visitors until you have completely recovered, or until you have no signs and symptoms of infection.  Avoid preparing food or providing care for others. If you must prepare food or provide care for others, wear a mask and wash your hands before and after doing these things.  Where to  find more information  Centers for Disease Control and Prevention: cdc.gov  Summary  It is important to know how to keep infection from spreading.  Make sure everyone in your household washes their hands often with soap and water.  Disinfect surfaces that are frequently touched every day.  If you are sick, stay home except to get medical care.  This information is not intended to replace advice given to you by your health care provider. Make sure you discuss any questions you have with your health care provider.  Document Revised: 02/06/2023 Document Reviewed: 02/06/2023  [employer] Patient Education © 2023 [employer]    Depression, Adult  Depression refers to feeling sad, low, down in the dumps, blue, gloomy, or empty. In general, there are two kinds of depression:  Normal sadness or normal grief. This kind of depression is one that we all feel from time to time after upsetting life experiences, such as the loss of a job or the ending of a relationship. This kind of depression is considered normal, is short lived, and resolves within a few days to 2 weeks. Depression experienced after the loss of a loved one (bereavement) often lasts longer than 2 weeks but normally gets better with time.  Clinical depression. This kind of depression lasts longer than normal sadness or normal grief or interferes with your ability to function at home, at work, and in school. It also interferes with your personal relationships. It affects almost every aspect of your life. Clinical depression is an illness.  Symptoms of depression can also be caused by conditions other than those mentioned above, such as:  Physical illness. Some physical illnesses, including underactive thyroid gland (hypothyroidism), severe anemia, specific types of cancer, diabetes, uncontrolled seizures, heart and lung problems, strokes, and chronic pain are commonly associated with symptoms of depression.  Side effects of some prescription medicine. In some  people, certain types of medicine can cause symptoms of depression.  Substance abuse. Abuse of alcohol and illicit drugs can cause symptoms of depression.  SYMPTOMS  Symptoms of normal sadness and normal grief include the following:  Feeling sad or crying for short periods of time.  Not caring about anything (apathy).  Difficulty sleeping or sleeping too much.  No longer able to enjoy the things you used to enjoy.  Desire to be by oneself all the time (social isolation).  Lack of energy or motivation.  Difficulty concentrating or remembering.  Change in appetite or weight.  Restlessness or agitation.  Symptoms of clinical depression include the same symptoms of normal sadness or normal grief and also the following symptoms:  Feeling sad or crying all the time.  Feelings of guilt or worthlessness.  Feelings of hopelessness or helplessness.  Thoughts of suicide or the desire to harm yourself (suicidal ideation).  Loss of touch with reality (psychotic symptoms). Seeing or hearing things that are not real (hallucinations) or having false beliefs about your life or the people around you (delusions and paranoia).  DIAGNOSIS   The diagnosis of clinical depression is usually based on how bad the symptoms are and how long they have lasted. Your health care provider will also ask you questions about your medical history and substance use to find out if physical illness, use of prescription medicine, or substance abuse is causing your depression. Your health care provider may also order blood tests.  TREATMENT   Often, normal sadness and normal grief do not require treatment. However, sometimes antidepressant medicine is given for bereavement to ease the depressive symptoms until they resolve.  The treatment for clinical depression depends on how bad the symptoms are but often includes antidepressant medicine, counseling with a mental health professional, or both. Your health care provider will help to determine what treatment  is best for you.  Depression caused by physical illness usually goes away with appropriate medical treatment of the illness. If prescription medicine is causing depression, talk with your health care provider about stopping the medicine, decreasing the dose, or changing to another medicine.  Depression caused by the abuse of alcohol or illicit drugs goes away when you stop using these substances. Some adults need professional help in order to stop drinking or using drugs.  SEEK IMMEDIATE MEDICAL CARE IF:  You have thoughts about hurting yourself or others.  You lose touch with reality (have psychotic symptoms).  You are taking medicine for depression and have a serious side effect.  FOR MORE INFORMATION  National [place] on Mental Illness: www.pedro luis.org   National [place] of Mental Health: www.nimh.nih.gov      This information is not intended to replace advice given to you by your health care provider. Make sure you discuss any questions you have with your health care provider.     Document Released: 12/15/2001 Document Revised: 01/08/2016 Document Reviewed: 03/18/2013  [employer] Patient Education ©2016 [employer]

## 2024-09-25 NOTE — DISCHARGE SUMMARY
"  Physical Medicine & Rehabilitation Discharge Summary    Admission Date: 9/19/2024    Discharge Date: 9/25/2024    Attending Provider: Cecilio Sousa MD/PhD    Admission Diagnosis:   Active Hospital Problems    Diagnosis     *Streptococcus pneumoniae meningitis     Streptococcal meningitis     Bradycardia     Acute encephalopathy     Lumbar disc herniation     Sepsis due to Streptococcus pneumoniae with encephalopathy without septic shock (HCC)        Discharge Diagnosis:  Active Hospital Problems    Diagnosis     *Streptococcus pneumoniae meningitis     Streptococcal meningitis     Bradycardia     Acute encephalopathy     Lumbar disc herniation     Sepsis due to Streptococcus pneumoniae with encephalopathy without septic shock (HCC)        HPI per Admission History & Physical:  The patient is a 34 y.o.  male with no known PMHx;  who presented on 9/12/2024  5:50 PM with AMS after having a HA. Per documentation, patient had a headachge on 9/12, he laid down to take a nap and woke up with ASM. Upon eval at [facility] ED, patient was non verbal and had GCS 9. CT head and CTA head and neck had no obvious abnormalities but showed diffuse edema in the b/l hemispheres. Patient was transferred to [facility] for higher level of care, patient was intubated for AMS.  neurology was consulted, and patient had LP performed that showed \"milky\" CSF, with elevated WBC and protein in CSF. MRI brain obtained showed  hyperintense signal within the subarachnoid spaces concerning for meningitis. MRI L spine showed fluid in the T2 thecal sc, consistent with meningitis. MRV head negative for venous thrombosis.  PCR panel + for strep pneumonia. Blood cultures with NGTD.  ID consulted, patient was sarted on vanco and ceftriaxone, which was transitioned to  Penicillin G x 14 days, stop date is 9/26. Patient was extubated on 9/14.     Patient was admitted to [facility] on 9/19/2024.     Hospital Course by " Problem List:  Streptococcal meningitis - Patient with sepsis and meningitis with strep with ID consultation recommending 14 days Penicillin. End date is 9/27/24  -PT and OT for mobility and ADLs. Per guidelines, 15 hours per week between PT, OT and/or SLP.  -Follow-up Neurology. Continue Penicillin, will check to see if can be done outpatient. Check weekly labs 9/23 WBC stable, ESR wnl. ID follow-up on 9/25, recommending daily Levaquin. Will complete 9/27, discharge 9/25     S1 L nerve root compression - Found on MRI of L spine, will need outpatient follow-up     Anemia - Check AM CBC - 14.9, improved, will recheck next week. Repeat 13.8, will monitor      Hyperglycemia - Check AM CMP, was on steroids. A1c 5.6, most likely steroids.      Azotemia - Check AM CMP - 18, improved from 23. Repeat 15, resolved.      Elevated LFTs - elevated on admission, will recheck next week. ALT still elevated will monitor     Pain - Patient on PRN Tylenol, Ibuprofen and Tramadol     Vitamin D insufficiency - 25 on admission, start 1000 U      Skin - Patient at risk for skin breakdown due to debility in areas including sacrum, achilles, elbows and head in addition to other sites. Nursing to assess skin daily.      GI Ppx - Patient on Prilosec for GERD prophylaxis. Patient on Senna-docusate for constipation prophylaxis.      DVT Ppx - Patient Lovenox on transfer. Ambulating > 150 feet, discontinue Lovenox    Functional Status at Discharge  Eating:  Independent  Eating Description:     Grooming:  Independent  Grooming Description:     Bathing:  Independent  Bathing Description:     Upper Body Dressing:  Independent  Upper Body Dressing Description:     Lower Body Dressing:  Independent  Lower Body Dressing Description:     Discharge Location : Home  Patient Discharging with Assist of: Spouse / Significant Other  Level of Supervision Required: No Supervision  Recommended Equipment for Discharge: None  Recommended Services Upon Discharge:  No Follow-Up Occupational Therapy Recommended  Long Term Goals Met: 1  Criteria for Termination of Services: Maximum Function Achieved for Inpatient Rehabilitation  Walk:  Independent  Distance Walked:   (outdoor, hospital perimeter)  Number of Times Distance Was Traveled:  1  Assistive Device:  None  Gait Deviation:  No deviation  Wheelchair:     Distance Propelled:      Wheelchair Description:     Stairs Independent  Stairs Description    Discharge Location: Home  Patient Discharging with Assist of: Family  Level of Supervision Required Upon Discharge: No Supervision  Recommended Equipment for Discharge: None  Recommeded Services Upon Discharge: No Follow Up Services Recommended  Long Term Goals Met: 1  Long Term Goals Not Met: 0  Criteria for Termination of Services: Maximum Function Achieved for Inpatient Rehabilitation  Comprehension:  Independent  Comprehension Description:     Expression:  Independent  Expression Description:     Social Interaction:  Independent  Social Interaction Description:     Problem Solving:  Independent  Problem Solving Description:     Memory:  Independent  Memory Description:          Cecilio GA M.D., personally performed a complete drug regimen review and no potential clinically significant medication issues were identified.   Discharge Medication:     Medication List        START taking these medications        Instructions   levoFLOXacin 250 MG Tabs  Commonly known as: Levaquin   Take 3 Tablets by mouth every day. Indications: Infection caused by Bacteria  Dose: 750 mg     vitamin D 1000 UNIT Tabs  Start taking on: September 26, 2024  Commonly known as: Cholecalciferol   Take 1 Tablet by mouth every day.  Dose: 1,000 Units            CONTINUE taking these medications        Instructions   acetaminophen 500 MG Tabs  Commonly known as: Tylenol   Take 1,000 mg by mouth every 6 hours as needed (Headache). 2 tablets = 1,000 mg.  Dose: 1,000 mg     Melatonin 5 MG  Chew   Chew 10 mg at bedtime as needed (Sleep). 2 tablets = 10 mg.  Dose: 10 mg     NS SOLN 100 mL with penicillin G potassium [id number] UNIT SOLR 4 Million Units   Infuse 4 Million Units into a venous catheter every 4 hours for 9 days.  Dose: 4 Million Units              Discharge Diet:  Current Diet Order   Procedures    Diet Order Diet: Regular       Discharge Activity:  As tolerated     Disposition:  Patient to discharge home with family support and community resources.    Equipment:  None    Follow-up & Discharge Instructions:  Follow up with your primary care provider (PCP) within 7-10 days of discharge to review your medications and take over your care.     If you develop chest pain, fever, chills, change in neurologic function (weakness, sensation changes, vision changes), or other concerning sxs, seek immediate medical attention or call 911.      No future appointments.    Condition on Discharge:  Good    More than 31 minutes was spent on discharging this patient, including face-to-face time, prescription management, and the dictation of this note.    Cecilio Sousa M.D.    Date of Service: 9/25/2024

## 2024-09-25 NOTE — PROGRESS NOTES
Midline to right upper extremity removed per order. Patient was sitting upright, held breath for removal of catheter, compression dressing applied.

## 2024-09-25 NOTE — PROGRESS NOTES
This patient received individualized medication counseling regarding the current regimen they are receiving in this facility. Potential adverse effects, monitoring parameters, and proper administration were covered in preparation for their discharge. The patient asked relevant questions regarding the medications for which answers were provided. Our pharmacy hours and phone number were provided for follow up questions should they arise.  Mauri Krueger  [facility]

## 2024-09-25 NOTE — PROGRESS NOTES
NURSING DAILY NOTE    Name: Kwesi Rivera   Date of Admission: 9/19/2024   Admitting Diagnosis: Streptococcus pneumoniae meningitis  Attending Physician: BELGICA BAIRD M.D.  Allergies: Patient has no known allergies.    Safety  Patient Assist  Supervision  Patient Precautions     Precaution Comments  high functional  Bed Transfer Status  Independent  Toilet Transfer Status   Independent  Assistive Devices  None  Oxygen  None - Room Air  Diet/Therapeutic Dining  Current Diet Order   Procedures    Diet Order Diet: Regular     Pill Administration  whole  Agitated Behavioral Scale     ABS Level of Severity       Fall Risk  Has the patient had a fall this admission?   No  Lola Murrieta Fall Risk Scoring  5, NO RISK  Fall Risk Safety Measures  ok to leave pt in bathroom    Vitals  Temperature: 36.7 °C (98 °F)  Temp src: Oral  Pulse: 72  Respiration: 18  Blood Pressure: 114/72  Blood Pressure MAP (Calculated): 86 MM HG  BP Location: Right, Upper Arm  Patient BP Position: Sitting     Oxygen  Pulse Oximetry: 97 %  O2 (LPM): 0  O2 Delivery Device: None - Room Air    Bowel and Bladder  Last Bowel Movement  09/24/24 (per patient)  Stool Type  Type 4: Like a sausage or snake, smooth and soft  Bowel Device     Continent  Bladder: Continent void   Bowel: Continent movement  Bladder Function  Number of Times Voided: 1  Urine Color: Yellow  Genitourinary Assessment   Bladder Assessment (WDL):  Within Defined Limits  Urine Color: Yellow  Bladder Device: Bathroom  Bladder Scan: Post Void  $ Bladder Scan Results (mL): 24    Skin  Kenneth Score   22  Sensory Interventions   Skin Preventative Measures: Pillows in Use for Support / Positioning  Moisture Interventions         Pain  Pain Rating Scale  0 - No Pain  Pain Location  Head  Pain Location Orientation  Posterior  Pain Interventions   Declines    ADLs    Bathing   Self Care, No Assistance Required  Linen Change    Complete  Personal Hygiene  Moist Ynes Wipes  Chlorhexidine Bath      Oral Care  Brushed Teeth  Teeth/Dentures     Shave     Nutrition Percentage Eaten  Lunch, Between 50-75% Consumed  Environmental Precautions     Patient Turns/Positioning  Patient Turns Self from Side to Side  Patient Turns Assistance/Tolerance     Bed Positions  Bed Controls On  Head of Bed Elevated         Psychosocial/Neurologic Assessment  Psychosocial Assessment  Psychosocial (WDL):  Within Defined Limits  Neurologic Assessment  Neuro (WDL): Exceptions to WDL  Level of Consciousness: Alert  Orientation Level: Oriented X4  Cognition: Appropriate judgement  Speech: Clear  Pupil Assesment: No  EENT (WDL):  Within Defined Limits    Cardio/Pulmonary Assessment  Edema      Respiratory Breath Sounds  RUL Breath Sounds: Clear  RML Breath Sounds: Clear  RLL Breath Sounds: Clear  ROBE Breath Sounds: Clear  LLL Breath Sounds: Clear  Cardiac Assessment   Cardiac (WDL):  Within Defined Limits

## 2024-09-25 NOTE — PROGRESS NOTES
NURSING DAILY NOTE    Name: Kwesi Rivera   Date of Admission: 9/19/2024   Admitting Diagnosis: Streptococcus pneumoniae meningitis  Attending Physician: BELGICA BAIRD M.D.  Allergies: Patient has no known allergies.    Safety  Patient Assist  Supervision  Patient Precautions     Precaution Comments  high functional  Bed Transfer Status  Independent  Toilet Transfer Status   Independent  Assistive Devices  None  Oxygen  None - Room Air  Diet/Therapeutic Dining  Current Diet Order   Procedures    Diet Order Diet: Regular     Pill Administration  whole  Agitated Behavioral Scale     ABS Level of Severity       Fall Risk  Has the patient had a fall this admission?   No  Lola Murrieta Fall Risk Scoring  5, NO RISK  Fall Risk Safety Measures  MOD I day shift    Vitals  Temperature: 36.7 °C (98 °F)  Temp src: Oral  Pulse: 72  Respiration: 18  Blood Pressure: 114/72  Blood Pressure MAP (Calculated): 86 MM HG  BP Location: Right, Upper Arm  Patient BP Position: Sitting     Oxygen  Pulse Oximetry: 97 %  O2 (LPM): 0  O2 Delivery Device: None - Room Air    Bowel and Bladder  Last Bowel Movement  09/21/24  Stool Type  Type 4: Like a sausage or snake, smooth and soft  Bowel Device     Continent  Bladder: Continent void   Bowel: Continent movement  Bladder Function  Number of Times Voided: 1  Urine Color: Yellow  Genitourinary Assessment   Bladder Assessment (WDL):  Within Defined Limits  Urine Color: Yellow  Bladder Device: Bathroom  Bladder Scan: Post Void  $ Bladder Scan Results (mL): 24    Skin  Kenneth Score   22  Sensory Interventions   Skin Preventative Measures: Pillows in Use for Support / Positioning  Moisture Interventions         Pain  Pain Rating Scale  0 - No Pain  Pain Location  Head  Pain Location Orientation  Posterior  Pain Interventions   Declines    ADLs    Bathing   Self Care, No Assistance Required  Linen Change   Complete  Personal  Hygiene  Moist Ynes Wipes  Chlorhexidine Bath      Oral Care  Brushed Teeth  Teeth/Dentures     Shave     Nutrition Percentage Eaten  Lunch, Between 50-75% Consumed  Environmental Precautions     Patient Turns/Positioning  Patient Turns Self from Side to Side  Patient Turns Assistance/Tolerance     Bed Positions  Bed Controls On, Bed Locked  Head of Bed Elevated         Psychosocial/Neurologic Assessment  Psychosocial Assessment  Psychosocial (WDL):  Within Defined Limits  Neurologic Assessment  Neuro (WDL): Exceptions to WDL  Level of Consciousness: Alert  Orientation Level: Oriented X4  Cognition: Appropriate judgement  Speech: Clear  Pupil Assesment: No  EENT (WDL):  Within Defined Limits    Cardio/Pulmonary Assessment  Edema      Respiratory Breath Sounds  RUL Breath Sounds: Clear  RML Breath Sounds: Clear  RLL Breath Sounds: Clear  ROBE Breath Sounds: Clear  LLL Breath Sounds: Clear  Cardiac Assessment   Cardiac (WDL):  Within Defined Limits

## 2024-09-27 ENCOUNTER — TELEPHONE (OUTPATIENT)
Dept: INFECTIOUS DISEASES | Facility: MEDICAL CENTER | Age: 34
End: 2024-09-27
Payer: OTHER GOVERNMENT

## 2024-09-27 NOTE — TELEPHONE ENCOUNTER
Patient called to schedule HFV , patient has just 1 more day left on his abx tx per Dr Cisse last notes bellow. Advised patient to follow up with his PCP and if concerns PCP can send referral to office for follow up currently not on file. Patients discharge summary stated to call Maki GARCIA for follow up unsure why this was instructed since was seen by Renjaylin Cisse M.D.  Physician  Infectious Disease     Progress Notes     Signed     Date of Service: 9/24/2024  2:54 PM       Brief ID note:     Contacted by  as patient currently at rehab has recovered well and would like to be discharged ASAP.  He is currently on IV penicillin for Strep pneumo meningitis with a stop date of 9/26.     Chart reviewed.  He has only 2 days of antibiotics left and the organism is levofloxacin susceptible, thus okay to stop IV antibiotics, remove midline/PICC line and complete course with p.o. levofloxacin 750 mg daily

## 2025-03-26 ENCOUNTER — APPOINTMENT (OUTPATIENT)
Dept: RADIOLOGY | Facility: MEDICAL CENTER | Age: 35
End: 2025-03-26
Attending: NURSE PRACTITIONER
Payer: COMMERCIAL

## 2025-03-29 ENCOUNTER — HOSPITAL ENCOUNTER (OUTPATIENT)
Dept: RADIOLOGY | Facility: MEDICAL CENTER | Age: 35
End: 2025-03-29
Attending: NURSE PRACTITIONER
Payer: COMMERCIAL

## 2025-03-29 DIAGNOSIS — G44.89 OTHER HEADACHE SYNDROME: ICD-10-CM

## 2025-03-29 PROCEDURE — 70551 MRI BRAIN STEM W/O DYE: CPT
